# Patient Record
Sex: FEMALE | Race: WHITE | Employment: OTHER | URBAN - NONMETROPOLITAN AREA
[De-identification: names, ages, dates, MRNs, and addresses within clinical notes are randomized per-mention and may not be internally consistent; named-entity substitution may affect disease eponyms.]

---

## 2017-02-17 ENCOUNTER — OFFICE VISIT (OUTPATIENT)
Dept: PRIMARY CARE CLINIC | Facility: CLINIC | Age: 67
End: 2017-02-17

## 2017-02-17 VITALS
DIASTOLIC BLOOD PRESSURE: 81 MMHG | OXYGEN SATURATION: 93 % | HEART RATE: 79 BPM | TEMPERATURE: 99.5 F | BODY MASS INDEX: 36.37 KG/M2 | WEIGHT: 213 LBS | SYSTOLIC BLOOD PRESSURE: 122 MMHG | HEIGHT: 64 IN | RESPIRATION RATE: 16 BRPM

## 2017-02-17 DIAGNOSIS — J01.00 ACUTE MAXILLARY SINUSITIS, RECURRENCE NOT SPECIFIED: Primary | ICD-10-CM

## 2017-02-17 PROCEDURE — 4040F PNEUMOC VAC/ADMIN/RCVD: CPT | Performed by: NURSE PRACTITIONER

## 2017-02-17 PROCEDURE — G8400 PT W/DXA NO RESULTS DOC: HCPCS | Performed by: NURSE PRACTITIONER

## 2017-02-17 PROCEDURE — 3017F COLORECTAL CA SCREEN DOC REV: CPT | Performed by: NURSE PRACTITIONER

## 2017-02-17 PROCEDURE — 1090F PRES/ABSN URINE INCON ASSESS: CPT | Performed by: NURSE PRACTITIONER

## 2017-02-17 PROCEDURE — 3014F SCREEN MAMMO DOC REV: CPT | Performed by: NURSE PRACTITIONER

## 2017-02-17 PROCEDURE — G8484 FLU IMMUNIZE NO ADMIN: HCPCS | Performed by: NURSE PRACTITIONER

## 2017-02-17 PROCEDURE — G8427 DOCREV CUR MEDS BY ELIG CLIN: HCPCS | Performed by: NURSE PRACTITIONER

## 2017-02-17 PROCEDURE — G8417 CALC BMI ABV UP PARAM F/U: HCPCS | Performed by: NURSE PRACTITIONER

## 2017-02-17 PROCEDURE — 99202 OFFICE O/P NEW SF 15 MIN: CPT | Performed by: NURSE PRACTITIONER

## 2017-02-17 PROCEDURE — 1123F ACP DISCUSS/DSCN MKR DOCD: CPT | Performed by: NURSE PRACTITIONER

## 2017-02-17 PROCEDURE — 1036F TOBACCO NON-USER: CPT | Performed by: NURSE PRACTITIONER

## 2017-02-17 RX ORDER — SILICONE ADHESIVE 1.5" X 3"
2 SHEET (EA) TOPICAL PRN
COMMUNITY

## 2017-02-17 RX ORDER — LIDOCAINE 50 MG/G
1 OINTMENT TOPICAL DAILY
COMMUNITY
Start: 2016-04-21

## 2017-02-17 RX ORDER — AMOXICILLIN AND CLAVULANATE POTASSIUM 875; 125 MG/1; MG/1
1 TABLET, FILM COATED ORAL 2 TIMES DAILY
Qty: 20 TABLET | Refills: 0 | Status: SHIPPED | OUTPATIENT
Start: 2017-02-17 | End: 2017-02-27

## 2017-02-17 RX ORDER — GUAIFENESIN AND CODEINE PHOSPHATE 100; 10 MG/5ML; MG/5ML
5 SOLUTION ORAL EVERY 4 HOURS PRN
Qty: 120 ML | Refills: 0 | Status: SHIPPED | OUTPATIENT
Start: 2017-02-17 | End: 2017-02-24

## 2017-02-17 RX ORDER — PEN NEEDLE, DIABETIC 31 GX5/16"
1 NEEDLE, DISPOSABLE MISCELLANEOUS 3 TIMES DAILY
COMMUNITY
Start: 2017-02-01

## 2017-02-17 ASSESSMENT — ENCOUNTER SYMPTOMS
RHINORRHEA: 1
COUGH: 1
HEMOPTYSIS: 0
NAUSEA: 1
WHEEZING: 1
CHANGE IN BOWEL HABIT: 0
VOMITING: 0
HEARTBURN: 0
DIARRHEA: 0
SORE THROAT: 0
VISUAL CHANGE: 0
SHORTNESS OF BREATH: 0
ABDOMINAL PAIN: 0
SWOLLEN GLANDS: 0

## 2018-01-15 ENCOUNTER — OFFICE VISIT (OUTPATIENT)
Dept: PRIMARY CARE CLINIC | Age: 68
End: 2018-01-15
Payer: MEDICARE

## 2018-01-15 VITALS
TEMPERATURE: 98.7 F | RESPIRATION RATE: 20 BRPM | WEIGHT: 215 LBS | HEIGHT: 65 IN | HEART RATE: 82 BPM | DIASTOLIC BLOOD PRESSURE: 96 MMHG | BODY MASS INDEX: 35.82 KG/M2 | SYSTOLIC BLOOD PRESSURE: 127 MMHG | OXYGEN SATURATION: 95 %

## 2018-01-15 DIAGNOSIS — R53.83 FATIGUE, UNSPECIFIED TYPE: ICD-10-CM

## 2018-01-15 DIAGNOSIS — J01.00 ACUTE MAXILLARY SINUSITIS, RECURRENCE NOT SPECIFIED: Primary | ICD-10-CM

## 2018-01-15 LAB
INFLUENZA A ANTIBODY: NEGATIVE
INFLUENZA B ANTIBODY: NEGATIVE

## 2018-01-15 PROCEDURE — 87804 INFLUENZA ASSAY W/OPTIC: CPT | Performed by: NURSE PRACTITIONER

## 2018-01-15 PROCEDURE — 99213 OFFICE O/P EST LOW 20 MIN: CPT | Performed by: NURSE PRACTITIONER

## 2018-01-15 RX ORDER — GABAPENTIN 600 MG/1
1200 TABLET ORAL 2 TIMES DAILY
COMMUNITY

## 2018-01-15 RX ORDER — FAMOTIDINE 20 MG/1
40 TABLET, FILM COATED ORAL 2 TIMES DAILY
COMMUNITY

## 2018-01-15 RX ORDER — TRIAMTERENE AND HYDROCHLOROTHIAZIDE 37.5; 25 MG/1; MG/1
1 TABLET ORAL DAILY
COMMUNITY

## 2018-01-15 RX ORDER — HYDROCODONE BITARTRATE AND ACETAMINOPHEN 7.5; 325 MG/1; MG/1
1 TABLET ORAL EVERY 6 HOURS PRN
COMMUNITY

## 2018-01-15 RX ORDER — INSULIN GLARGINE 100 [IU]/ML
78 INJECTION, SOLUTION SUBCUTANEOUS NIGHTLY
COMMUNITY

## 2018-01-15 RX ORDER — AMOXICILLIN AND CLAVULANATE POTASSIUM 500; 125 MG/1; MG/1
1 TABLET, FILM COATED ORAL 2 TIMES DAILY
Qty: 20 TABLET | Refills: 0 | Status: SHIPPED | OUTPATIENT
Start: 2018-01-15 | End: 2018-01-25

## 2018-01-15 RX ORDER — BUPROPION HYDROCHLORIDE 100 MG/1
200 TABLET ORAL 2 TIMES DAILY
COMMUNITY

## 2018-01-15 RX ORDER — LISINOPRIL 10 MG/1
10 TABLET ORAL DAILY
COMMUNITY

## 2018-01-15 RX ORDER — METOPROLOL TARTRATE 50 MG/1
125 TABLET, FILM COATED ORAL NIGHTLY
COMMUNITY

## 2018-01-15 RX ORDER — FENOFIBRATE 145 MG/1
145 TABLET, COATED ORAL DAILY
COMMUNITY

## 2018-01-15 RX ORDER — SIMVASTATIN 20 MG
20 TABLET ORAL NIGHTLY
COMMUNITY

## 2018-01-15 RX ORDER — CYCLOBENZAPRINE HCL 10 MG
10 TABLET ORAL 3 TIMES DAILY PRN
COMMUNITY

## 2018-01-15 RX ORDER — CHOLECALCIFEROL (VITAMIN D3) 125 MCG
5000 CAPSULE ORAL DAILY
COMMUNITY
End: 2018-01-17 | Stop reason: ALTCHOICE

## 2018-01-15 ASSESSMENT — ENCOUNTER SYMPTOMS
SINUS PAIN: 1
CHEST TIGHTNESS: 0
STRIDOR: 0
TROUBLE SWALLOWING: 0
FACIAL SWELLING: 0
SINUS PRESSURE: 1
COUGH: 1
WHEEZING: 0

## 2018-01-15 NOTE — PATIENT INSTRUCTIONS

## 2018-01-15 NOTE — PROGRESS NOTES
[facility] WALK-IN CARE  [place] [address] [id number]  Dept: [phone]  Dept Fax: [phone]    Robert Penn is a 78 y.o. female who presents to the [address] [place] [address] in Care today for her medical conditions/complaints as noted below. Robert Penn is c/o of Cough (four weeks) and Congestion      HPI:   HPI  Robert Penn is a 78 y.o. female who presents with x 4 weeks of cough and congestion. Sinus pain and facial pain on right side only. Sinus drainage is  thicker and green. Tactile fevers. She reports that she has been coughing but believes the cough is due to the sinus drainage. Mulugeta Martin reports a significant hisitory of cystic lungs and uses oxygen 2012 at 3L/NC. Sees a pulmonologist in [place] Dr. Yosvany Davis. Denies hsitory of MI and or failure. Reports herself as \"pretty well off, not sick a lot\". Denies kidney dysfunction. No past medical history on file. Current Outpatient Prescriptions   Medication Sig Dispense Refill    famotidine (PEPCID) 20 MG tablet Take 20 mg by mouth 2 times daily      aspirin 81 MG tablet Take 81 mg by mouth daily      buPROPion (WELLBUTRIN) 100 MG tablet Take 100 mg by mouth 2 times daily      cyclobenzaprine (FLEXERIL) 10 MG tablet Take 10 mg by mouth 3 times daily as needed for Muscle spasms      gabapentin (NEURONTIN) 600 MG tablet Take 600 mg by mouth 3 times daily.       metoprolol tartrate (LOPRESSOR) 50 MG tablet Take 50 mg by mouth 2 times daily      triamterene-hydrochlorothiazide (MAXZIDE-25) 37.5-25 MG per tablet Take 1 tablet by mouth daily      simvastatin (ZOCOR) 20 MG tablet Take 20 mg by mouth nightly      metFORMIN (GLUCOPHAGE) 500 MG tablet Take 500 mg by mouth 2 times daily (with meals)      lisinopril (PRINIVIL;ZESTRIL) 10 MG tablet Take 10 mg by mouth daily      fenofibrate (TRICOR) 145 MG tablet Take 145 mg by mouth daily      insulin glargine (LANTUS) 100 UNIT/ML injection

## 2018-02-12 ENCOUNTER — ANESTHESIA EVENT (OUTPATIENT)
Dept: OPERATING ROOM | Age: 68
End: 2018-02-12
Payer: MEDICARE

## 2018-02-13 ENCOUNTER — HOSPITAL ENCOUNTER (OUTPATIENT)
Age: 68
Setting detail: OUTPATIENT SURGERY
Discharge: HOME | End: 2018-02-13
Attending: ORTHOPAEDIC SURGERY | Admitting: ORTHOPAEDIC SURGERY
Payer: MEDICARE

## 2018-02-13 ENCOUNTER — ANESTHESIA (OUTPATIENT)
Dept: OPERATING ROOM | Age: 68
End: 2018-02-13
Payer: MEDICARE

## 2018-02-13 VITALS
TEMPERATURE: 97.4 F | DIASTOLIC BLOOD PRESSURE: 65 MMHG | SYSTOLIC BLOOD PRESSURE: 135 MMHG | HEART RATE: 73 BPM | OXYGEN SATURATION: 91 % | BODY MASS INDEX: 35.82 KG/M2 | WEIGHT: 215 LBS | HEIGHT: 65 IN | RESPIRATION RATE: 18 BRPM

## 2018-02-13 VITALS — OXYGEN SATURATION: 93 % | DIASTOLIC BLOOD PRESSURE: 68 MMHG | SYSTOLIC BLOOD PRESSURE: 124 MMHG

## 2018-02-13 DIAGNOSIS — G89.18 POST-OP PAIN: Primary | ICD-10-CM

## 2018-02-13 PROBLEM — G56.02 LEFT CARPAL TUNNEL SYNDROME: Status: ACTIVE | Noted: 2018-02-13

## 2018-02-13 LAB — GLUCOSE BLD-MCNC: 97 MG/DL (ref 74–100)

## 2018-02-13 PROCEDURE — 82947 ASSAY GLUCOSE BLOOD QUANT: CPT

## 2018-02-13 PROCEDURE — 7100000011 HC PHASE II RECOVERY - ADDTL 15 MIN: Performed by: ORTHOPAEDIC SURGERY

## 2018-02-13 PROCEDURE — 3600000013 HC SURGERY LEVEL 3 ADDTL 15MIN: Performed by: ORTHOPAEDIC SURGERY

## 2018-02-13 PROCEDURE — 7100000010 HC PHASE II RECOVERY - FIRST 15 MIN: Performed by: ORTHOPAEDIC SURGERY

## 2018-02-13 PROCEDURE — 6370000000 HC RX 637 (ALT 250 FOR IP): Performed by: ORTHOPAEDIC SURGERY

## 2018-02-13 PROCEDURE — 6360000002 HC RX W HCPCS: Performed by: NURSE ANESTHETIST, CERTIFIED REGISTERED

## 2018-02-13 PROCEDURE — 3700000001 HC ADD 15 MINUTES (ANESTHESIA): Performed by: ORTHOPAEDIC SURGERY

## 2018-02-13 PROCEDURE — 3600000003 HC SURGERY LEVEL 3 BASE: Performed by: ORTHOPAEDIC SURGERY

## 2018-02-13 PROCEDURE — 6360000002 HC RX W HCPCS: Performed by: ORTHOPAEDIC SURGERY

## 2018-02-13 PROCEDURE — 2500000003 HC RX 250 WO HCPCS: Performed by: NURSE ANESTHETIST, CERTIFIED REGISTERED

## 2018-02-13 PROCEDURE — 3700000000 HC ANESTHESIA ATTENDED CARE: Performed by: ORTHOPAEDIC SURGERY

## 2018-02-13 PROCEDURE — 2580000003 HC RX 258: Performed by: ORTHOPAEDIC SURGERY

## 2018-02-13 PROCEDURE — 2500000003 HC RX 250 WO HCPCS

## 2018-02-13 RX ORDER — FENTANYL CITRATE 50 UG/ML
50 INJECTION, SOLUTION INTRAMUSCULAR; INTRAVENOUS EVERY 5 MIN PRN
Status: DISCONTINUED | OUTPATIENT
Start: 2018-02-13 | End: 2018-02-13 | Stop reason: HOSPADM

## 2018-02-13 RX ORDER — OXYCODONE HYDROCHLORIDE AND ACETAMINOPHEN 5; 325 MG/1; MG/1
2 TABLET ORAL EVERY 4 HOURS PRN
Status: DISCONTINUED | OUTPATIENT
Start: 2018-02-13 | End: 2018-02-13 | Stop reason: HOSPADM

## 2018-02-13 RX ORDER — METOCLOPRAMIDE HYDROCHLORIDE 5 MG/ML
INJECTION INTRAMUSCULAR; INTRAVENOUS PRN
Status: DISCONTINUED | OUTPATIENT
Start: 2018-02-13 | End: 2018-02-13 | Stop reason: SDUPTHER

## 2018-02-13 RX ORDER — PROMETHAZINE HYDROCHLORIDE 25 MG/ML
6.25 INJECTION, SOLUTION INTRAMUSCULAR; INTRAVENOUS
Status: DISCONTINUED | OUTPATIENT
Start: 2018-02-13 | End: 2018-02-13 | Stop reason: HOSPADM

## 2018-02-13 RX ORDER — SODIUM CHLORIDE 0.9 % (FLUSH) 0.9 %
10 SYRINGE (ML) INJECTION PRN
Status: DISCONTINUED | OUTPATIENT
Start: 2018-02-13 | End: 2018-02-13 | Stop reason: HOSPADM

## 2018-02-13 RX ORDER — SODIUM CHLORIDE, SODIUM LACTATE, POTASSIUM CHLORIDE, CALCIUM CHLORIDE 600; 310; 30; 20 MG/100ML; MG/100ML; MG/100ML; MG/100ML
INJECTION, SOLUTION INTRAVENOUS CONTINUOUS
Status: DISCONTINUED | OUTPATIENT
Start: 2018-02-13 | End: 2018-02-13 | Stop reason: HOSPADM

## 2018-02-13 RX ORDER — PROPOFOL 10 MG/ML
INJECTION, EMULSION INTRAVENOUS CONTINUOUS PRN
Status: DISCONTINUED | OUTPATIENT
Start: 2018-02-13 | End: 2018-02-13 | Stop reason: SDUPTHER

## 2018-02-13 RX ORDER — FENTANYL CITRATE 50 UG/ML
25 INJECTION, SOLUTION INTRAMUSCULAR; INTRAVENOUS EVERY 5 MIN PRN
Status: DISCONTINUED | OUTPATIENT
Start: 2018-02-13 | End: 2018-02-13 | Stop reason: HOSPADM

## 2018-02-13 RX ORDER — FENTANYL CITRATE 50 UG/ML
INJECTION, SOLUTION INTRAMUSCULAR; INTRAVENOUS PRN
Status: DISCONTINUED | OUTPATIENT
Start: 2018-02-13 | End: 2018-02-13 | Stop reason: SDUPTHER

## 2018-02-13 RX ORDER — ACETAMINOPHEN 325 MG/1
650 TABLET ORAL ONCE
Status: COMPLETED | OUTPATIENT
Start: 2018-02-13 | End: 2018-02-13

## 2018-02-13 RX ORDER — ONDANSETRON 2 MG/ML
INJECTION INTRAMUSCULAR; INTRAVENOUS PRN
Status: DISCONTINUED | OUTPATIENT
Start: 2018-02-13 | End: 2018-02-13 | Stop reason: SDUPTHER

## 2018-02-13 RX ORDER — HYDROCODONE BITARTRATE AND ACETAMINOPHEN 5; 325 MG/1; MG/1
1 TABLET ORAL EVERY 4 HOURS PRN
Qty: 20 TABLET | Refills: 0 | Status: SHIPPED | OUTPATIENT
Start: 2018-02-13 | End: 2018-02-17

## 2018-02-13 RX ORDER — LIDOCAINE HYDROCHLORIDE 20 MG/ML
INJECTION, SOLUTION EPIDURAL; INFILTRATION; INTRACAUDAL; PERINEURAL PRN
Status: DISCONTINUED | OUTPATIENT
Start: 2018-02-13 | End: 2018-02-13 | Stop reason: SDUPTHER

## 2018-02-13 RX ORDER — BUPIVACAINE HYDROCHLORIDE 2.5 MG/ML
INJECTION, SOLUTION INFILTRATION; PERINEURAL PRN
Status: DISCONTINUED | OUTPATIENT
Start: 2018-02-13 | End: 2018-02-13 | Stop reason: HOSPADM

## 2018-02-13 RX ORDER — LIDOCAINE HYDROCHLORIDE 20 MG/ML
INJECTION, SOLUTION INFILTRATION; PERINEURAL PRN
Status: DISCONTINUED | OUTPATIENT
Start: 2018-02-13 | End: 2018-02-13 | Stop reason: SDUPTHER

## 2018-02-13 RX ORDER — OXYCODONE HYDROCHLORIDE AND ACETAMINOPHEN 5; 325 MG/1; MG/1
1 TABLET ORAL EVERY 4 HOURS PRN
Status: DISCONTINUED | OUTPATIENT
Start: 2018-02-13 | End: 2018-02-13 | Stop reason: HOSPADM

## 2018-02-13 RX ORDER — SODIUM CHLORIDE 0.9 % (FLUSH) 0.9 %
10 SYRINGE (ML) INJECTION EVERY 12 HOURS SCHEDULED
Status: DISCONTINUED | OUTPATIENT
Start: 2018-02-13 | End: 2018-02-13 | Stop reason: HOSPADM

## 2018-02-13 RX ADMIN — SODIUM CHLORIDE, POTASSIUM CHLORIDE, SODIUM LACTATE AND CALCIUM CHLORIDE: 600; 310; 30; 20 INJECTION, SOLUTION INTRAVENOUS at 10:39

## 2018-02-13 RX ADMIN — FENTANYL CITRATE 20 MCG: 50 INJECTION INTRAMUSCULAR; INTRAVENOUS at 14:03

## 2018-02-13 RX ADMIN — PROPOFOL 80 MCG/KG/MIN: 10 INJECTION, EMULSION INTRAVENOUS at 13:42

## 2018-02-13 RX ADMIN — FENTANYL CITRATE 20 MCG: 50 INJECTION INTRAMUSCULAR; INTRAVENOUS at 13:47

## 2018-02-13 RX ADMIN — CEFAZOLIN SODIUM 2 G: 2 SOLUTION INTRAVENOUS at 13:17

## 2018-02-13 RX ADMIN — METOCLOPRAMIDE 10 MG: 5 INJECTION, SOLUTION INTRAMUSCULAR; INTRAVENOUS at 14:01

## 2018-02-13 RX ADMIN — FENTANYL CITRATE 20 MCG: 50 INJECTION INTRAMUSCULAR; INTRAVENOUS at 14:09

## 2018-02-13 RX ADMIN — FENTANYL CITRATE 20 MCG: 50 INJECTION INTRAMUSCULAR; INTRAVENOUS at 13:39

## 2018-02-13 RX ADMIN — FENTANYL CITRATE 20 MCG: 50 INJECTION INTRAMUSCULAR; INTRAVENOUS at 13:57

## 2018-02-13 RX ADMIN — LIDOCAINE HYDROCHLORIDE 15 ML: 20 INJECTION, SOLUTION EPIDURAL; INFILTRATION; INTRACAUDAL; PERINEURAL at 13:48

## 2018-02-13 RX ADMIN — LIDOCAINE HYDROCHLORIDE 50 MG: 20 INJECTION, SOLUTION INFILTRATION; PERINEURAL at 13:41

## 2018-02-13 RX ADMIN — ACETAMINOPHEN 650 MG: 325 TABLET, FILM COATED ORAL at 14:52

## 2018-02-13 RX ADMIN — ONDANSETRON 4 MG: 2 INJECTION INTRAMUSCULAR; INTRAVENOUS at 13:59

## 2018-02-13 ASSESSMENT — PULMONARY FUNCTION TESTS
PIF_VALUE: 1
PIF_VALUE: 0
PIF_VALUE: 1
PIF_VALUE: 1
PIF_VALUE: 0
PIF_VALUE: 1
PIF_VALUE: 0
PIF_VALUE: 0
PIF_VALUE: 1
PIF_VALUE: 0
PIF_VALUE: 1
PIF_VALUE: 0
PIF_VALUE: 2
PIF_VALUE: 1
PIF_VALUE: 0
PIF_VALUE: 1
PIF_VALUE: 0
PIF_VALUE: 1
PIF_VALUE: 1
PIF_VALUE: 0
PIF_VALUE: 1
PIF_VALUE: 2

## 2018-02-13 ASSESSMENT — PAIN SCALES - GENERAL
PAINLEVEL_OUTOF10: 0
PAINLEVEL_OUTOF10: 3

## 2018-02-13 ASSESSMENT — PAIN - FUNCTIONAL ASSESSMENT: PAIN_FUNCTIONAL_ASSESSMENT: 0-10

## 2018-02-13 NOTE — ANESTHESIA POSTPROCEDURE EVALUATION
Department of Anesthesiology  Postprocedure Note    Patient: Misa Dutta  MRN: [id number]  YOB: 1950  Date of evaluation: 2/13/2018  Time:  2:30 PM     Procedure Summary     Date:  02/13/18 Room / Location:  [address] [place] [address] / [facility] AT THE [facility]    Anesthesia Start:  3049 Anesthesia Stop:  1017    Procedures:       CARPAL TUNNEL RELEASE (Right )      FINGER TRIGGER RELEASE-RIGHT MIDDLE (Right ) Diagnosis:  (TRIGGER FINGER,RIGHT MIDDLE FINGER, CARPAL TUNNEL SYNDROME RIGHT WRIST)    Surgeon:  Patrice Ni MD Responsible Provider:  Zohreh Veliz CRNA    Anesthesia Type:  general, TIVA, Kody block ASA Status:  4          Anesthesia Type: general, TIVA, Kody block    Yadira Phase I:      Yadira Phase II:      Last vitals: Reviewed and per EMR flowsheets.        Anesthesia Post Evaluation    Patient location during evaluation: PACU  Patient participation: complete - patient participated  Level of consciousness: awake and alert  Pain score: 0  Airway patency: patent  Nausea & Vomiting: no nausea and no vomiting  Complications: no  Cardiovascular status: blood pressure returned to baseline  Respiratory status: acceptable  Hydration status: euvolemic

## 2018-02-13 NOTE — ANESTHESIA PRE PROCEDURE
Department of Anesthesiology  Preprocedure Note       Name:  Darlene Joshua   Age:  78 y.o.  :  1950                                          MRN:  [id number]         Date:  2018      Surgeon: Teetee Snyder):  Meño Ledesma MD    Procedure: Procedure(s):  CARPAL TUNNEL RELEASE  FINGER TRIGGER RELEASE-RIGHT MIDDLE    Medications prior to admission:   Prior to Admission medications    Medication Sig Start Date End Date Taking?  Authorizing Provider   lidocaine (XYLOCAINE) 5 % ointment Apply 1 inch topically daily 16  Yes Historical Provider, MD   sodium chloride (PEACE 128) 5 % ophthalmic solution Apply 2 drops to eye as needed   Yes Historical Provider, MD   buPROPion (WELLBUTRIN) 100 MG tablet Take 200 mg by mouth 2 times daily    Yes Historical Provider, MD   albuterol (PROVENTIL) (2.5 MG/3ML) 0.083% nebulizer solution Take 2.5 mg by nebulization every 6 hours as needed for Wheezing   Yes Historical Provider, MD   cyclobenzaprine (FLEXERIL) 10 MG tablet Take 20 mg by mouth 3 times daily as needed for Muscle spasms   Yes Historical Provider, MD   famotidine (PEPCID) 20 MG tablet Take 20 mg by mouth 2 times daily   Yes Historical Provider, MD   fenofibrate (TRICOR) 145 MG tablet Take 145 mg by mouth daily   Yes Historical Provider, MD   gabapentin (NEURONTIN) 600 MG tablet Take 1,200 mg by mouth 2 times daily    Yes Historical Provider, MD   triamterene-hydrochlorothiazide (MAXZIDE-25) 37.5-25 MG per tablet Take 2 tablets by mouth daily   Yes Historical Provider, MD   insulin glargine (LANTUS) 100 UNIT/ML injection vial Inject 78 Units into the skin nightly    Yes Historical Provider, MD   levothyroxine (SYNTHROID) 75 MCG tablet Take 75 mcg by mouth Daily   Yes Historical Provider, MD   metFORMIN (GLUCOPHAGE) 1000 MG tablet Take 1,000 mg by mouth 2 times daily (with meals)   Yes Historical Provider, MD   metoprolol (LOPRESSOR) 50 MG tablet Take 125 mg by mouth nightly    Yes Historical Provider, MD

## 2018-02-13 NOTE — OP NOTE
Date of surgery: 2/13/18    Preoperative diagnosis:  1. Left carpal tunnel syndrome  2. Left trigger Middle Finger    Postoperative diagnosis:  Same    Procedure:  1. Left carpal tunnel release  2. Left Middle Finger A1 pulley release    Surgeon: Enedelia Fan M.D. Anesthesia: Kody block with MAC    EBL: Minimal    Tourniquet Time: 22 minutes at 024 mm Hg    Complications: None    Disposition: Stable to the recovery room. Indications for procedure: Patient presented with history and physical exam findings consistent with carpal tunnel syndrome. Diagnosis was confirmed with electrodiagnostic testing. Patient attempted conservative treatment including brace wear. After failing to improve, patient elected to proceed with surgical release. She also had painful locking in the long digit, consistent with trigger finger. Patient elected to undergo surgical release. Informed consent was obtained following discussion of risks and benefits. Description of procedure: Patient was identified in preop holding area. With the patient's agreement, the operative extremity was marked as site of surgery. The patient was taken to the operating room and placed supine on the operative table. Prophylactic IV antibiotics were administered. A well-padded tourniquet was applied to the operative extremity. [place] block was administered. IV sedation was administered by anesthesia. The left upper extremity was then prepped and draped in sterile fashion. Preoperative timeout taken to ensure the proper patient, surgical site and procedure. We then created a longitudinal incision centered over the transverse carpal ligament. Sharp dissection was carried down through the skin and subcutaneous fat. Superficial palmar fascia was identified and was split longitudinally exposing the transverse carpal ligament. The ligament was then incised longitudinally.  Sharp tenotomy scissors were then used to bluntly spread superficial and deep to the

## 2018-06-02 ENCOUNTER — HOSPITAL ENCOUNTER (EMERGENCY)
Age: 68
Discharge: HOME | End: 2018-06-02
Attending: EMERGENCY MEDICINE
Payer: MEDICARE

## 2018-06-02 ENCOUNTER — APPOINTMENT (OUTPATIENT)
Dept: CT IMAGING | Age: 68
End: 2018-06-02
Payer: MEDICARE

## 2018-06-02 VITALS
SYSTOLIC BLOOD PRESSURE: 140 MMHG | HEART RATE: 67 BPM | OXYGEN SATURATION: 98 % | DIASTOLIC BLOOD PRESSURE: 73 MMHG | RESPIRATION RATE: 18 BRPM | TEMPERATURE: 98.5 F

## 2018-06-02 DIAGNOSIS — M54.50 ACUTE BILATERAL LOW BACK PAIN WITHOUT SCIATICA: Primary | ICD-10-CM

## 2018-06-02 LAB
-: ABNORMAL
AMORPHOUS: ABNORMAL
BACTERIA: ABNORMAL
BILIRUBIN URINE: NEGATIVE
CASTS UA: ABNORMAL /LPF
COLOR: YELLOW
COMMENT UA: ABNORMAL
CRYSTALS, UA: ABNORMAL /HPF
EPITHELIAL CELLS UA: ABNORMAL /HPF (ref 0–25)
GLUCOSE URINE: NEGATIVE
KETONES, URINE: NEGATIVE
LEUKOCYTE ESTERASE, URINE: NEGATIVE
MUCUS: ABNORMAL
NITRITE, URINE: NEGATIVE
OTHER OBSERVATIONS UA: ABNORMAL
PH UA: 7 (ref 5–9)
PROTEIN UA: ABNORMAL
RBC UA: ABNORMAL /HPF (ref 0–2)
RENAL EPITHELIAL, UA: ABNORMAL /HPF
SPECIFIC GRAVITY UA: 1.01 (ref 1.01–1.02)
TRICHOMONAS: ABNORMAL
TURBIDITY: CLEAR
URINE HGB: NEGATIVE
UROBILINOGEN, URINE: NORMAL
WBC UA: ABNORMAL /HPF (ref 0–5)
YEAST: ABNORMAL

## 2018-06-02 PROCEDURE — 99284 EMERGENCY DEPT VISIT MOD MDM: CPT

## 2018-06-02 PROCEDURE — 81001 URINALYSIS AUTO W/SCOPE: CPT

## 2018-06-02 PROCEDURE — 74176 CT ABD & PELVIS W/O CONTRAST: CPT

## 2018-06-02 ASSESSMENT — PAIN SCALES - GENERAL
PAINLEVEL_OUTOF10: 6
PAINLEVEL_OUTOF10: 0

## 2018-06-02 ASSESSMENT — PAIN DESCRIPTION - LOCATION: LOCATION: FLANK

## 2018-06-02 ASSESSMENT — PAIN DESCRIPTION - PAIN TYPE: TYPE: ACUTE PAIN

## 2018-06-02 ASSESSMENT — ENCOUNTER SYMPTOMS: BACK PAIN: 1

## 2018-06-02 ASSESSMENT — PAIN DESCRIPTION - ORIENTATION: ORIENTATION: RIGHT;LEFT

## 2020-05-16 ENCOUNTER — APPOINTMENT (OUTPATIENT)
Dept: CT IMAGING | Age: 70
End: 2020-05-16
Payer: MEDICARE

## 2020-05-16 ENCOUNTER — HOSPITAL ENCOUNTER (EMERGENCY)
Age: 70
Discharge: HOME | End: 2020-05-16
Attending: EMERGENCY MEDICINE
Payer: MEDICARE

## 2020-05-16 VITALS
HEART RATE: 80 BPM | TEMPERATURE: 97.3 F | RESPIRATION RATE: 17 BRPM | OXYGEN SATURATION: 95 % | DIASTOLIC BLOOD PRESSURE: 102 MMHG | SYSTOLIC BLOOD PRESSURE: 155 MMHG

## 2020-05-16 LAB
-: ABNORMAL
ABSOLUTE EOS #: 0.31 K/UL (ref 0–0.44)
ABSOLUTE IMMATURE GRANULOCYTE: <0.03 K/UL (ref 0–0.3)
ABSOLUTE LYMPH #: 4.28 K/UL (ref 1.1–3.7)
ABSOLUTE MONO #: 0.47 K/UL (ref 0.1–1.2)
ALBUMIN SERPL-MCNC: 4.3 G/DL (ref 3.5–5.2)
ALBUMIN/GLOBULIN RATIO: 1.7 (ref 1–2.5)
ALP BLD-CCNC: 57 U/L (ref 35–104)
ALT SERPL-CCNC: 13 U/L (ref 5–33)
AMORPHOUS: ABNORMAL
ANION GAP SERPL CALCULATED.3IONS-SCNC: 11 MMOL/L (ref 9–17)
AST SERPL-CCNC: 17 U/L
BACTERIA: ABNORMAL
BASOPHILS # BLD: 1 % (ref 0–2)
BASOPHILS ABSOLUTE: 0.06 K/UL (ref 0–0.2)
BILIRUB SERPL-MCNC: 0.26 MG/DL (ref 0.3–1.2)
BILIRUBIN URINE: NEGATIVE
BUN BLDV-MCNC: 21 MG/DL (ref 8–23)
BUN/CREAT BLD: 21 (ref 9–20)
CALCIUM SERPL-MCNC: 9.8 MG/DL (ref 8.6–10.4)
CASTS UA: ABNORMAL /LPF
CHLORIDE BLD-SCNC: 102 MMOL/L (ref 98–107)
CO2: 30 MMOL/L (ref 20–31)
COLOR: YELLOW
COMMENT UA: ABNORMAL
CREAT SERPL-MCNC: 0.98 MG/DL (ref 0.5–0.9)
CRYSTALS, UA: ABNORMAL /HPF
DIFFERENTIAL TYPE: ABNORMAL
EOSINOPHILS RELATIVE PERCENT: 4 % (ref 1–4)
EPITHELIAL CELLS UA: ABNORMAL /HPF (ref 0–25)
GFR AFRICAN AMERICAN: >60 ML/MIN
GFR NON-AFRICAN AMERICAN: 56 ML/MIN
GFR SERPL CREATININE-BSD FRML MDRD: ABNORMAL ML/MIN/{1.73_M2}
GFR SERPL CREATININE-BSD FRML MDRD: ABNORMAL ML/MIN/{1.73_M2}
GLUCOSE BLD-MCNC: 147 MG/DL (ref 70–99)
GLUCOSE URINE: NEGATIVE
HCT VFR BLD CALC: 44 % (ref 36.3–47.1)
HEMOGLOBIN: 13.7 G/DL (ref 11.9–15.1)
IMMATURE GRANULOCYTES: 0 %
KETONES, URINE: NEGATIVE
LEUKOCYTE ESTERASE, URINE: NEGATIVE
LIPASE: 161 U/L (ref 13–60)
LYMPHOCYTES # BLD: 57 % (ref 24–43)
MCH RBC QN AUTO: 29.1 PG (ref 25.2–33.5)
MCHC RBC AUTO-ENTMCNC: 31.1 G/DL (ref 28.4–34.8)
MCV RBC AUTO: 93.4 FL (ref 82.6–102.9)
MONOCYTES # BLD: 6 % (ref 3–12)
MUCUS: ABNORMAL
NITRITE, URINE: NEGATIVE
NRBC AUTOMATED: 0 PER 100 WBC
OTHER OBSERVATIONS UA: ABNORMAL
PDW BLD-RTO: 13.2 % (ref 11.8–14.4)
PH UA: 6 (ref 5–9)
PLATELET # BLD: 144 K/UL (ref 138–453)
PLATELET ESTIMATE: ABNORMAL
PMV BLD AUTO: 11.1 FL (ref 8.1–13.5)
POTASSIUM SERPL-SCNC: 3.7 MMOL/L (ref 3.7–5.3)
PROTEIN UA: ABNORMAL
RBC # BLD: 4.71 M/UL (ref 3.95–5.11)
RBC # BLD: ABNORMAL 10*6/UL
RBC UA: ABNORMAL /HPF (ref 0–2)
RENAL EPITHELIAL, UA: ABNORMAL /HPF
SEG NEUTROPHILS: 32 % (ref 36–65)
SEGMENTED NEUTROPHILS ABSOLUTE COUNT: 2.44 K/UL (ref 1.5–8.1)
SODIUM BLD-SCNC: 143 MMOL/L (ref 135–144)
SPECIFIC GRAVITY UA: >1.03 (ref 1.01–1.02)
TOTAL PROTEIN: 6.9 G/DL (ref 6.4–8.3)
TRICHOMONAS: ABNORMAL
TURBIDITY: CLEAR
URINE HGB: ABNORMAL
UROBILINOGEN, URINE: NORMAL
WBC # BLD: 7.6 K/UL (ref 3.5–11.3)
WBC # BLD: ABNORMAL 10*3/UL
WBC UA: ABNORMAL /HPF (ref 0–5)
YEAST: ABNORMAL

## 2020-05-16 PROCEDURE — 2580000003 HC RX 258: Performed by: EMERGENCY MEDICINE

## 2020-05-16 PROCEDURE — 85025 COMPLETE CBC W/AUTO DIFF WBC: CPT

## 2020-05-16 PROCEDURE — 83690 ASSAY OF LIPASE: CPT

## 2020-05-16 PROCEDURE — 74177 CT ABD & PELVIS W/CONTRAST: CPT

## 2020-05-16 PROCEDURE — 81001 URINALYSIS AUTO W/SCOPE: CPT

## 2020-05-16 PROCEDURE — 36415 COLL VENOUS BLD VENIPUNCTURE: CPT

## 2020-05-16 PROCEDURE — 6360000004 HC RX CONTRAST MEDICATION: Performed by: EMERGENCY MEDICINE

## 2020-05-16 PROCEDURE — 80053 COMPREHEN METABOLIC PANEL: CPT

## 2020-05-16 PROCEDURE — 99284 EMERGENCY DEPT VISIT MOD MDM: CPT

## 2020-05-16 RX ORDER — 0.9 % SODIUM CHLORIDE 0.9 %
500 INTRAVENOUS SOLUTION INTRAVENOUS ONCE
Status: COMPLETED | OUTPATIENT
Start: 2020-05-16 | End: 2020-05-16

## 2020-05-16 RX ADMIN — SODIUM CHLORIDE 500 ML: 9 INJECTION, SOLUTION INTRAVENOUS at 00:52

## 2020-05-16 RX ADMIN — IOPAMIDOL 75 ML: 755 INJECTION, SOLUTION INTRAVENOUS at 01:39

## 2020-05-16 ASSESSMENT — PAIN DESCRIPTION - LOCATION: LOCATION: ABDOMEN

## 2020-05-16 ASSESSMENT — PAIN DESCRIPTION - ORIENTATION: ORIENTATION: LEFT;UPPER

## 2020-05-16 ASSESSMENT — ENCOUNTER SYMPTOMS
ABDOMINAL PAIN: 1
EYE PAIN: 0
SHORTNESS OF BREATH: 0

## 2020-05-16 ASSESSMENT — PAIN SCALES - GENERAL: PAINLEVEL_OUTOF10: 3

## 2020-05-16 ASSESSMENT — PAIN DESCRIPTION - DESCRIPTORS: DESCRIPTORS: ACHING

## 2020-05-16 ASSESSMENT — PAIN DESCRIPTION - FREQUENCY: FREQUENCY: CONTINUOUS

## 2020-05-16 ASSESSMENT — PAIN DESCRIPTION - PAIN TYPE: TYPE: ACUTE PAIN

## 2020-05-16 NOTE — ED PROVIDER NOTES
[facility] ED  eMERGENCY dEPARTMENT eNCOUnter      Pt Name: Chuckie St  MRN: [id number]  [facility]  Date of evaluation: 2020  Provider: Kayley Herrera MD    CHIEF COMPLAINT     Chief Complaint   Patient presents with    Abdominal Pain     3 when sit, 10 when bends, started yesterday, LUQ       HISTORY OF PRESENT ILLNESS    Chuckie St is a 72 y.o. female who presents to the emergency department for evaluation of abdominal pain. Pain is located to the left side of the abdomen. Radiates down since the left lower quadrant. Symptoms started yesterday. Symptoms are worse with bending over. Better with sitting. She denies any associated nausea, vomiting, or diarrhea. Patient was recently prescribed Cipro and Flagyl for presumed diverticulitis but has not started taking these yet. She describes the pain as sharp. She rates the pain as moderate.       PAST MEDICAL HISTORY     Past Medical History:   Diagnosis Date    Chronic kidney disease     [facility]ystic fibrosis [facility]     Diabetes mellitus ([facility]     Fibromyalgia     Hyperlipidemia     Hypertension     Thyroid disease        SURGICAL HISTORY       Past Surgical History:   Procedure Laterality Date    BREAST LUMPECTOMY Left      SECTION      CHOLECYSTECTOMY      COLONOSCOPY  2013    HYSTERECTOMY      NE INCISE FINGER TENDON SHEATH Right 2018    FINGER TRIGGER RELEASE-RIGHT MIDDLE performed by Jessy You MD at [address]/CARPAL TUNNEL SURG Right 2018    CARPAL TUNNEL RELEASE performed by Jessy You MD at [address] [facility]       Previous Medications    ALBUTEROL (PROVENTIL) (2.5 MG/3ML) 0.083% NEBULIZER SOLUTION    Take 2.5 mg by nebulization every 6 hours as needed for Wheezing    ASPIRIN 81 MG TABLET    Take 81 mg by mouth daily    B-D UF III MINI PEN NEEDLES 31G X 5 MM MISC    Apply 1 Pen topically 3 times daily    BUPROPION (WELLBUTRIN) 100 MG TABLET    Take 200 mg by mouth 2 times daily     CYCLOBENZAPRINE (FLEXERIL) 10 MG TABLET    Take 20 mg by mouth 3 times daily as needed for Muscle spasms    FAMOTIDINE (PEPCID) 20 MG TABLET    Take 20 mg by mouth 2 times daily    FENOFIBRATE (TRICOR) 145 MG TABLET    Take 145 mg by mouth daily    GABAPENTIN (NEURONTIN) 600 MG TABLET    Take 1,200 mg by mouth 2 times daily     INSULIN GLARGINE (LANTUS) 100 UNIT/ML INJECTION VIAL    Inject 78 Units into the skin nightly     LEVOTHYROXINE (SYNTHROID) 75 MCG TABLET    Take 75 mcg by mouth Daily Takes 2 tablets on sunday    LIDOCAINE (XYLOCAINE) 5 % OINTMENT    Apply 1 inch topically daily    LISINOPRIL (PRINIVIL;ZESTRIL) 10 MG TABLET    Take 10 mg by mouth daily    METFORMIN (GLUCOPHAGE) 1000 MG TABLET    Take 1,000 mg by mouth 2 times daily (with meals)    METOPROLOL (LOPRESSOR) 50 MG TABLET    Take 125 mg by mouth nightly     SIMVASTATIN (ZOCOR) 20 MG TABLET    Take 20 mg by mouth nightly    SODIUM CHLORIDE (PEACE 128) 5 % OPHTHALMIC SOLUTION    Apply 2 drops to eye as needed    TRIAMTERENE-HYDROCHLOROTHIAZIDE (MAXZIDE-25) 37.5-25 MG PER TABLET    Take 2 tablets by mouth daily       ALLERGIES     Patient has no known allergies. FAMILY HISTORY     History reviewed. No pertinent family history.      SOCIAL HISTORY       Social History     Socioeconomic History    Marital status:      Spouse name: None    Number of children: None    Years of education: None    Highest education level: None   Occupational History    None   Social Needs    Financial resource strain: None    Food insecurity     Worry: None     Inability: None    Transportation needs     Medical: None     Non-medical: None   Tobacco Use    Smoking status: Former Smoker    Smokeless tobacco: Never Used   Substance and Sexual Activity    Alcohol use: No    Drug use: No    Sexual activity: None   Lifestyle    Physical activity     Days per week: None     Minutes per session: None    Stress: None   Relationships radiologist and shows perhaps early mild acute diverticulitis. Patient continues to be in no distress. She continues not waiting for pain at this time. Patient was discharged with instructions to continue Cipro and Flagyl as prescribed by her PCP. Return precautions were discussed. ED Medications administered this visit:    Medications   0.9 % sodium chloride bolus (0 mLs Intravenous Stopped 5/16/20 0225)   iopamidol (ISOVUE-370) 76 % injection 75 mL (75 mLs Intravenous Given 5/16/20 7188)       CLINICAL IMPRESSION      1.  Acute diverticulitis          DISPOSITION/PLAN   DISPOSITION Decision To Discharge 05/16/2020 02:58:57 AM      PATIENT REFERRED TO:  Rajanimaggy Padilla  [facility]  [address]  [address] [place] [address]  [phone]    Schedule an appointment as soon as possible for a visit in 3 days        DISCHARGE MEDICATIONS:  New Prescriptions    No medications on file            So Colunga MD (electronically signed)  AttendingEmergency Department Provider            So Colunga MD  05/16/20 2223

## 2021-05-24 ENCOUNTER — TELEPHONE (OUTPATIENT)
Dept: CARDIOLOGY CLINIC | Age: 71
End: 2021-05-24

## 2021-10-16 ENCOUNTER — APPOINTMENT (OUTPATIENT)
Dept: CT IMAGING | Age: 71
DRG: 392 | End: 2021-10-16
Payer: MEDICARE

## 2021-10-16 ENCOUNTER — HOSPITAL ENCOUNTER (INPATIENT)
Age: 71
LOS: 4 days | Discharge: HOME | DRG: 392 | End: 2021-10-20
Attending: FAMILY MEDICINE | Admitting: FAMILY MEDICINE
Payer: MEDICARE

## 2021-10-16 DIAGNOSIS — K57.20 COLONIC DIVERTICULAR ABSCESS: Primary | ICD-10-CM

## 2021-10-16 PROBLEM — Z79.4 TYPE 2 DIABETES MELLITUS WITH COMPLICATION, WITH LONG-TERM CURRENT USE OF INSULIN (HCC): Status: ACTIVE | Noted: 2021-10-16

## 2021-10-16 PROBLEM — E11.8 TYPE 2 DIABETES MELLITUS WITH COMPLICATION, WITH LONG-TERM CURRENT USE OF INSULIN (HCC): Status: ACTIVE | Noted: 2021-10-16

## 2021-10-16 PROBLEM — K57.92 DIVERTICULITIS: Status: ACTIVE | Noted: 2021-10-16

## 2021-10-16 PROBLEM — I10 ESSENTIAL HYPERTENSION: Status: ACTIVE | Noted: 2021-10-16

## 2021-10-16 LAB
-: ABNORMAL
ABSOLUTE EOS #: 0.29 K/UL (ref 0–0.44)
ABSOLUTE IMMATURE GRANULOCYTE: 0.11 K/UL (ref 0–0.3)
ABSOLUTE LYMPH #: 3.26 K/UL (ref 1.1–3.7)
ABSOLUTE MONO #: 0.83 K/UL (ref 0.1–1.2)
ALBUMIN SERPL-MCNC: 3.8 G/DL (ref 3.5–5.2)
ALBUMIN/GLOBULIN RATIO: 1.2 (ref 1–2.5)
ALP BLD-CCNC: 87 U/L (ref 35–104)
ALT SERPL-CCNC: 26 U/L (ref 5–33)
AMORPHOUS: ABNORMAL
AMYLASE: 35 U/L (ref 28–100)
ANION GAP SERPL CALCULATED.3IONS-SCNC: 10 MMOL/L (ref 9–17)
AST SERPL-CCNC: 20 U/L
BACTERIA: ABNORMAL
BASOPHILS # BLD: 1 % (ref 0–2)
BASOPHILS ABSOLUTE: 0.06 K/UL (ref 0–0.2)
BILIRUB SERPL-MCNC: 0.34 MG/DL (ref 0.3–1.2)
BILIRUBIN URINE: ABNORMAL
BUN BLDV-MCNC: 21 MG/DL (ref 8–23)
BUN/CREAT BLD: 17 (ref 9–20)
CALCIUM SERPL-MCNC: 9.5 MG/DL (ref 8.6–10.4)
CASTS UA: ABNORMAL /LPF
CHLORIDE BLD-SCNC: 96 MMOL/L (ref 98–107)
CO2: 24 MMOL/L (ref 20–31)
COLOR: YELLOW
COMMENT UA: ABNORMAL
CREAT SERPL-MCNC: 1.21 MG/DL (ref 0.5–0.9)
CRYSTALS, UA: ABNORMAL /HPF
DIFFERENTIAL TYPE: ABNORMAL
EOSINOPHILS RELATIVE PERCENT: 3 % (ref 1–4)
EPITHELIAL CELLS UA: ABNORMAL /HPF (ref 0–25)
GFR AFRICAN AMERICAN: 53 ML/MIN
GFR NON-AFRICAN AMERICAN: 44 ML/MIN
GFR SERPL CREATININE-BSD FRML MDRD: ABNORMAL ML/MIN/{1.73_M2}
GFR SERPL CREATININE-BSD FRML MDRD: ABNORMAL ML/MIN/{1.73_M2}
GLUCOSE BLD-MCNC: 107 MG/DL (ref 70–99)
GLUCOSE BLD-MCNC: 136 MG/DL (ref 74–100)
GLUCOSE BLD-MCNC: 55 MG/DL (ref 74–100)
GLUCOSE URINE: NEGATIVE
HCT VFR BLD CALC: 41.3 % (ref 36.3–47.1)
HEMOGLOBIN: 13.1 G/DL (ref 11.9–15.1)
IMMATURE GRANULOCYTES: 1 %
KETONES, URINE: NEGATIVE
LACTIC ACID, WHOLE BLOOD: NORMAL MMOL/L (ref 0.7–2.1)
LACTIC ACID: 1.3 MMOL/L (ref 0.5–2.2)
LEUKOCYTE ESTERASE, URINE: NEGATIVE
LIPASE: 24 U/L (ref 13–60)
LYMPHOCYTES # BLD: 29 % (ref 24–43)
MCH RBC QN AUTO: 29.6 PG (ref 25.2–33.5)
MCHC RBC AUTO-ENTMCNC: 31.7 G/DL (ref 28.4–34.8)
MCV RBC AUTO: 93.2 FL (ref 82.6–102.9)
MONOCYTES # BLD: 7 % (ref 3–12)
MUCUS: ABNORMAL
NITRITE, URINE: NEGATIVE
NRBC AUTOMATED: 0 PER 100 WBC
OTHER OBSERVATIONS UA: ABNORMAL
PDW BLD-RTO: 13.5 % (ref 11.8–14.4)
PH UA: 5.5 (ref 5–9)
PLATELET # BLD: 202 K/UL (ref 138–453)
PLATELET ESTIMATE: ABNORMAL
PMV BLD AUTO: 10.4 FL (ref 8.1–13.5)
POTASSIUM SERPL-SCNC: 4.6 MMOL/L (ref 3.7–5.3)
PROTEIN UA: ABNORMAL
RBC # BLD: 4.43 M/UL (ref 3.95–5.11)
RBC # BLD: ABNORMAL 10*6/UL
RBC UA: ABNORMAL /HPF (ref 0–2)
RENAL EPITHELIAL, UA: ABNORMAL /HPF
SEG NEUTROPHILS: 59 % (ref 36–65)
SEGMENTED NEUTROPHILS ABSOLUTE COUNT: 6.83 K/UL (ref 1.5–8.1)
SODIUM BLD-SCNC: 130 MMOL/L (ref 135–144)
SPECIFIC GRAVITY UA: >1.03 (ref 1.01–1.02)
TOTAL PROTEIN: 7.1 G/DL (ref 6.4–8.3)
TRICHOMONAS: ABNORMAL
TURBIDITY: CLEAR
URINE HGB: NEGATIVE
UROBILINOGEN, URINE: NORMAL
WBC # BLD: 11.4 K/UL (ref 3.5–11.3)
WBC # BLD: ABNORMAL 10*3/UL
WBC UA: ABNORMAL /HPF (ref 0–5)
YEAST: ABNORMAL

## 2021-10-16 PROCEDURE — 2500000003 HC RX 250 WO HCPCS: Performed by: NURSE PRACTITIONER

## 2021-10-16 PROCEDURE — 83690 ASSAY OF LIPASE: CPT

## 2021-10-16 PROCEDURE — 85025 COMPLETE CBC W/AUTO DIFF WBC: CPT

## 2021-10-16 PROCEDURE — 6360000002 HC RX W HCPCS: Performed by: NURSE PRACTITIONER

## 2021-10-16 PROCEDURE — 74177 CT ABD & PELVIS W/CONTRAST: CPT

## 2021-10-16 PROCEDURE — 83036 HEMOGLOBIN GLYCOSYLATED A1C: CPT

## 2021-10-16 PROCEDURE — 94664 DEMO&/EVAL PT USE INHALER: CPT

## 2021-10-16 PROCEDURE — 96375 TX/PRO/DX INJ NEW DRUG ADDON: CPT

## 2021-10-16 PROCEDURE — 2580000003 HC RX 258: Performed by: NURSE PRACTITIONER

## 2021-10-16 PROCEDURE — 81001 URINALYSIS AUTO W/SCOPE: CPT

## 2021-10-16 PROCEDURE — 80053 COMPREHEN METABOLIC PANEL: CPT

## 2021-10-16 PROCEDURE — 82947 ASSAY GLUCOSE BLOOD QUANT: CPT

## 2021-10-16 PROCEDURE — 82150 ASSAY OF AMYLASE: CPT

## 2021-10-16 PROCEDURE — 6370000000 HC RX 637 (ALT 250 FOR IP): Performed by: NURSE PRACTITIONER

## 2021-10-16 PROCEDURE — 99284 EMERGENCY DEPT VISIT MOD MDM: CPT

## 2021-10-16 PROCEDURE — 1200000000 HC SEMI PRIVATE

## 2021-10-16 PROCEDURE — 96365 THER/PROPH/DIAG IV INF INIT: CPT

## 2021-10-16 PROCEDURE — 87040 BLOOD CULTURE FOR BACTERIA: CPT

## 2021-10-16 PROCEDURE — 6360000004 HC RX CONTRAST MEDICATION: Performed by: NURSE PRACTITIONER

## 2021-10-16 PROCEDURE — 83605 ASSAY OF LACTIC ACID: CPT

## 2021-10-16 RX ORDER — ONDANSETRON 2 MG/ML
4 INJECTION INTRAMUSCULAR; INTRAVENOUS ONCE
Status: COMPLETED | OUTPATIENT
Start: 2021-10-16 | End: 2021-10-16

## 2021-10-16 RX ORDER — DEXTROSE MONOHYDRATE 25 G/50ML
12.5 INJECTION, SOLUTION INTRAVENOUS PRN
Status: DISCONTINUED | OUTPATIENT
Start: 2021-10-16 | End: 2021-10-20 | Stop reason: HOSPADM

## 2021-10-16 RX ORDER — ONDANSETRON 2 MG/ML
4 INJECTION INTRAMUSCULAR; INTRAVENOUS EVERY 6 HOURS PRN
Status: DISCONTINUED | OUTPATIENT
Start: 2021-10-16 | End: 2021-10-20 | Stop reason: HOSPADM

## 2021-10-16 RX ORDER — MODAFINIL 200 MG/1
300 TABLET ORAL DAILY
Status: DISCONTINUED | OUTPATIENT
Start: 2021-10-17 | End: 2021-10-20 | Stop reason: HOSPADM

## 2021-10-16 RX ORDER — NICOTINE POLACRILEX 4 MG
15 LOZENGE BUCCAL PRN
Status: DISCONTINUED | OUTPATIENT
Start: 2021-10-16 | End: 2021-10-20 | Stop reason: HOSPADM

## 2021-10-16 RX ORDER — ROSUVASTATIN CALCIUM 20 MG/1
20 TABLET, COATED ORAL NIGHTLY
Status: DISCONTINUED | OUTPATIENT
Start: 2021-10-16 | End: 2021-10-20 | Stop reason: HOSPADM

## 2021-10-16 RX ORDER — SODIUM CHLORIDE 0.9 % (FLUSH) 0.9 %
10 SYRINGE (ML) INJECTION EVERY 12 HOURS SCHEDULED
Status: DISCONTINUED | OUTPATIENT
Start: 2021-10-16 | End: 2021-10-20 | Stop reason: HOSPADM

## 2021-10-16 RX ORDER — SODIUM CHLORIDE 9 MG/ML
25 INJECTION, SOLUTION INTRAVENOUS PRN
Status: DISCONTINUED | OUTPATIENT
Start: 2021-10-16 | End: 2021-10-20 | Stop reason: HOSPADM

## 2021-10-16 RX ORDER — LEVOTHYROXINE SODIUM 0.07 MG/1
75 TABLET ORAL DAILY
Status: DISCONTINUED | OUTPATIENT
Start: 2021-10-16 | End: 2021-10-20 | Stop reason: HOSPADM

## 2021-10-16 RX ORDER — ACETAMINOPHEN 325 MG/1
650 TABLET ORAL EVERY 6 HOURS PRN
Status: DISCONTINUED | OUTPATIENT
Start: 2021-10-16 | End: 2021-10-17 | Stop reason: SDUPTHER

## 2021-10-16 RX ORDER — DEXTROSE MONOHYDRATE 50 MG/ML
100 INJECTION, SOLUTION INTRAVENOUS PRN
Status: DISCONTINUED | OUTPATIENT
Start: 2021-10-16 | End: 2021-10-20 | Stop reason: HOSPADM

## 2021-10-16 RX ORDER — TRIAMTERENE AND HYDROCHLOROTHIAZIDE 37.5; 25 MG/1; MG/1
2 TABLET ORAL DAILY
Status: DISCONTINUED | OUTPATIENT
Start: 2021-10-16 | End: 2021-10-20 | Stop reason: HOSPADM

## 2021-10-16 RX ORDER — ONDANSETRON 4 MG/1
4 TABLET, ORALLY DISINTEGRATING ORAL EVERY 8 HOURS PRN
Status: DISCONTINUED | OUTPATIENT
Start: 2021-10-16 | End: 2021-10-20 | Stop reason: HOSPADM

## 2021-10-16 RX ORDER — CIPROFLOXACIN 2 MG/ML
400 INJECTION, SOLUTION INTRAVENOUS EVERY 12 HOURS
Status: DISCONTINUED | OUTPATIENT
Start: 2021-10-16 | End: 2021-10-20 | Stop reason: HOSPADM

## 2021-10-16 RX ORDER — ROSUVASTATIN CALCIUM 20 MG/1
20 TABLET, COATED ORAL DAILY
COMMUNITY

## 2021-10-16 RX ORDER — BUPROPION HYDROCHLORIDE 100 MG/1
200 TABLET ORAL 2 TIMES DAILY
Status: DISCONTINUED | OUTPATIENT
Start: 2021-10-16 | End: 2021-10-20 | Stop reason: HOSPADM

## 2021-10-16 RX ORDER — POLYETHYLENE GLYCOL 3350 17 G/17G
17 POWDER, FOR SOLUTION ORAL DAILY PRN
Status: DISCONTINUED | OUTPATIENT
Start: 2021-10-16 | End: 2021-10-20 | Stop reason: HOSPADM

## 2021-10-16 RX ORDER — ACETAMINOPHEN 650 MG/1
650 SUPPOSITORY RECTAL EVERY 6 HOURS PRN
Status: DISCONTINUED | OUTPATIENT
Start: 2021-10-16 | End: 2021-10-20 | Stop reason: HOSPADM

## 2021-10-16 RX ORDER — MORPHINE SULFATE 2 MG/ML
2 INJECTION, SOLUTION INTRAMUSCULAR; INTRAVENOUS
Status: DISCONTINUED | OUTPATIENT
Start: 2021-10-16 | End: 2021-10-20 | Stop reason: HOSPADM

## 2021-10-16 RX ORDER — MORPHINE SULFATE 4 MG/ML
4 INJECTION, SOLUTION INTRAMUSCULAR; INTRAVENOUS ONCE
Status: COMPLETED | OUTPATIENT
Start: 2021-10-16 | End: 2021-10-16

## 2021-10-16 RX ORDER — LISINOPRIL 10 MG/1
10 TABLET ORAL DAILY
Status: DISCONTINUED | OUTPATIENT
Start: 2021-10-16 | End: 2021-10-20 | Stop reason: HOSPADM

## 2021-10-16 RX ORDER — MODAFINIL 200 MG/1
300 TABLET ORAL DAILY
COMMUNITY

## 2021-10-16 RX ORDER — MORPHINE SULFATE 4 MG/ML
4 INJECTION, SOLUTION INTRAMUSCULAR; INTRAVENOUS
Status: DISCONTINUED | OUTPATIENT
Start: 2021-10-16 | End: 2021-10-20 | Stop reason: HOSPADM

## 2021-10-16 RX ORDER — ALBUTEROL SULFATE 2.5 MG/3ML
2.5 SOLUTION RESPIRATORY (INHALATION) EVERY 6 HOURS PRN
Status: DISCONTINUED | OUTPATIENT
Start: 2021-10-16 | End: 2021-10-20 | Stop reason: HOSPADM

## 2021-10-16 RX ORDER — SODIUM CHLORIDE 9 MG/ML
1000 INJECTION, SOLUTION INTRAVENOUS CONTINUOUS
Status: DISCONTINUED | OUTPATIENT
Start: 2021-10-16 | End: 2021-10-16

## 2021-10-16 RX ORDER — SODIUM CHLORIDE 0.9 % (FLUSH) 0.9 %
10 SYRINGE (ML) INJECTION PRN
Status: DISCONTINUED | OUTPATIENT
Start: 2021-10-16 | End: 2021-10-20 | Stop reason: HOSPADM

## 2021-10-16 RX ORDER — SODIUM CHLORIDE 9 MG/ML
INJECTION, SOLUTION INTRAVENOUS CONTINUOUS
Status: DISCONTINUED | OUTPATIENT
Start: 2021-10-16 | End: 2021-10-17

## 2021-10-16 RX ADMIN — ENOXAPARIN SODIUM 40 MG: 40 INJECTION SUBCUTANEOUS at 15:30

## 2021-10-16 RX ADMIN — IOPAMIDOL 75 ML: 755 INJECTION, SOLUTION INTRAVENOUS at 12:14

## 2021-10-16 RX ADMIN — METRONIDAZOLE 500 MG: 500 INJECTION, SOLUTION INTRAVENOUS at 17:01

## 2021-10-16 RX ADMIN — PIPERACILLIN AND TAZOBACTAM 3375 MG: 3; .375 INJECTION, POWDER, FOR SOLUTION INTRAVENOUS at 13:36

## 2021-10-16 RX ADMIN — SODIUM CHLORIDE 1000 ML: 9 INJECTION, SOLUTION INTRAVENOUS at 12:27

## 2021-10-16 RX ADMIN — MORPHINE SULFATE 4 MG: 4 INJECTION, SOLUTION INTRAMUSCULAR; INTRAVENOUS at 14:08

## 2021-10-16 RX ADMIN — FAMOTIDINE 20 MG: 10 INJECTION, SOLUTION INTRAVENOUS at 15:30

## 2021-10-16 RX ADMIN — ROSUVASTATIN CALCIUM 20 MG: 20 TABLET, FILM COATED ORAL at 20:48

## 2021-10-16 RX ADMIN — LISINOPRIL 10 MG: 10 TABLET ORAL at 20:48

## 2021-10-16 RX ADMIN — ACETAMINOPHEN 650 MG: 325 TABLET ORAL at 18:33

## 2021-10-16 RX ADMIN — ONDANSETRON 4 MG: 2 INJECTION INTRAMUSCULAR; INTRAVENOUS at 12:03

## 2021-10-16 RX ADMIN — TRIAMTERENE AND HYDROCHLOROTHIAZIDE 2 TABLET: 37.5; 25 TABLET ORAL at 20:48

## 2021-10-16 RX ADMIN — BUPROPION HYDROCHLORIDE 200 MG: 100 TABLET, FILM COATED ORAL at 20:48

## 2021-10-16 RX ADMIN — CIPROFLOXACIN 400 MG: 2 INJECTION, SOLUTION INTRAVENOUS at 20:54

## 2021-10-16 RX ADMIN — DEXTROSE MONOHYDRATE 12.5 G: 25 INJECTION, SOLUTION INTRAVENOUS at 19:51

## 2021-10-16 ASSESSMENT — PAIN DESCRIPTION - PAIN TYPE
TYPE: ACUTE PAIN
TYPE: ACUTE PAIN

## 2021-10-16 ASSESSMENT — ENCOUNTER SYMPTOMS
NAUSEA: 1
EYES NEGATIVE: 1
ABDOMINAL PAIN: 1
BLOOD IN STOOL: 0
VOMITING: 0
CONSTIPATION: 1
DIARRHEA: 0
ABDOMINAL DISTENTION: 0
RESPIRATORY NEGATIVE: 1

## 2021-10-16 ASSESSMENT — PAIN DESCRIPTION - ORIENTATION: ORIENTATION: LEFT;LOWER

## 2021-10-16 ASSESSMENT — PAIN SCALES - GENERAL
PAINLEVEL_OUTOF10: 6
PAINLEVEL_OUTOF10: 3
PAINLEVEL_OUTOF10: 0
PAINLEVEL_OUTOF10: 3

## 2021-10-16 ASSESSMENT — PAIN DESCRIPTION - LOCATION
LOCATION: HEAD
LOCATION: ABDOMEN

## 2021-10-16 ASSESSMENT — PAIN DESCRIPTION - FREQUENCY: FREQUENCY: INTERMITTENT

## 2021-10-16 ASSESSMENT — PAIN DESCRIPTION - DESCRIPTORS: DESCRIPTORS: CRAMPING;SHARP

## 2021-10-16 NOTE — ED PROVIDER NOTES
[facility] ED  EMERGENCY DEPARTMENT ENCOUNTER      Pt Name: Aggie Longoria  MRN: [id number]  [place] 1950  Date of evaluation: 10/16/2021  Provider: BEHZAD Son CNP    CHIEF COMPLAINT     Chief Complaint   Patient presents with    Abdominal Pain     LLQ onset Sunday; pain radiates to RLQ         HISTORY OF PRESENT ILLNESS   (Location/Symptom, Timing/Onset, Context/Setting,Quality, Duration, Modifying Factors, Severity)  Note limiting factors. Aggie Longoria is a66 y.o. female who presents to the emergency department     With complaints of abdominal pain. She states she feels that it is her diverticulitis. She complains of abdominal pain across her lower abdomen with the left greater than the right. She states her last BM was 1 week ago however she stated that yesterday she had a very small BM that was passed and was soft. She stated her pain began 1 week ago as well. She stated that she had a low-grade fever however was unable to tell me the value. She complains of intermittent nausea but no vomiting. Denies diarrhea but complains of constipation. She states normally she has bowel movements every day like clockwork. He did complain of some dysuria that began this week as well. Nursing Notes werereviewed. REVIEW OF SYSTEMS    (2-9 systems for level 4, 10 or more for level 5)     Review of Systems   Constitutional: Positive for activity change, appetite change and fever. HENT: Negative. Eyes: Negative. Respiratory: Negative. Cardiovascular: Negative. Gastrointestinal: Positive for abdominal pain, constipation and nausea. Negative for abdominal distention, blood in stool, diarrhea and vomiting. Genitourinary: Positive for dysuria. Musculoskeletal: Negative. Skin: Negative. Neurological: Negative. Psychiatric/Behavioral: Negative. Except as noted above the remainder of the review of systems was reviewed and negative.        PAST MEDICAL HISTORY     Past Medical History:   Diagnosis Date    Chronic kidney disease     Cystic fibrosis ([facility]     Diabetes mellitus ([facility]     Fibromyalgia     Hyperlipidemia     Hypertension     Thyroid disease          SURGICALHISTORY       Past Surgical History:   Procedure Laterality Date    BREAST LUMPECTOMY Left      SECTION      CHOLECYSTECTOMY      COLONOSCOPY  2013    HYSTERECTOMY      [place] INCISE FINGER TENDON SHEATH Right 2018    FINGER TRIGGER RELEASE-RIGHT MIDDLE performed by Ellen Calderon MD at [address]/CARPAL TUNNEL SURG Right 2018    CARPAL TUNNEL RELEASE performed by Ellen Calderon MD at [facility]       Current Discharge Medication List      CONTINUE these medications which have NOT CHANGED    Details   rosuvastatin (CRESTOR) 20 MG tablet Take 20 mg by mouth daily      modafinil (PROVIGIL) 200 MG tablet Take 300 mg by mouth daily.       sodium chloride (PEACE 128) 5 % ophthalmic solution Apply 2 drops to eye as needed      B-D UF III MINI PEN NEEDLES 31G X 5 MM MISC Apply 1 Pen topically 3 times daily      buPROPion (WELLBUTRIN) 100 MG tablet Take 200 mg by mouth 2 times daily       aspirin 81 MG tablet Take 81 mg by mouth daily      cyclobenzaprine (FLEXERIL) 10 MG tablet Take 20 mg by mouth 3 times daily as needed for Muscle spasms      famotidine (PEPCID) 20 MG tablet Take 20 mg by mouth 2 times daily      gabapentin (NEURONTIN) 600 MG tablet Take 1,200 mg by mouth 2 times daily       triamterene-hydrochlorothiazide (MAXZIDE-25) 37.5-25 MG per tablet Take 2 tablets by mouth daily      insulin glargine (LANTUS) 100 UNIT/ML injection vial Inject 78 Units into the skin nightly       levothyroxine (SYNTHROID) 75 MCG tablet Take 75 mcg by mouth Daily Takes 2 tablets on       metFORMIN (GLUCOPHAGE) 1000 MG tablet Take 1,000 mg by mouth 2 times daily (with meals)      metoprolol (LOPRESSOR) 50 MG tablet Take 125 mg by mouth nightly       lisinopril (PRINIVIL;ZESTRIL) 10 MG tablet Take 10 mg by mouth daily      lidocaine (XYLOCAINE) 5 % ointment Apply 1 inch topically daily      albuterol (PROVENTIL) (2.5 MG/3ML) 0.083% nebulizer solution Take 2.5 mg by nebulization every 6 hours as needed for Wheezing                  Patient has no known allergies. FAMILY HISTORY     History reviewed. No pertinent family history. SOCIAL HISTORY       Social History     Socioeconomic History    Marital status:      Spouse name: None    Number of children: None    Years of education: None    Highest education level: None   Occupational History    None   Tobacco Use    Smoking status: Former Smoker    Smokeless tobacco: Never Used   Substance and Sexual Activity    Alcohol use: No    Drug use: No    Sexual activity: None   Other Topics Concern    None   Social History Narrative    None     Social Determinants of Health     Financial Resource Strain:     Difficulty of Paying Living Expenses:    Food Insecurity:     Worried About Running Out of Food in the Last Year:     Ran Out of Food in the Last Year:    Transportation Needs:     Lack of Transportation (Medical):      Lack of Transportation (Non-Medical):    Physical Activity:     Days of Exercise per Week:     Minutes of Exercise per Session:    Stress:     Feeling of Stress :    Social Connections:     Frequency of Communication with Friends and Family:     Frequency of Social Gatherings with Friends and Family:     Attends Sikh Services:     Active Member of Clubs or Organizations:     Attends Club or Organization Meetings:     Marital Status:    Intimate Partner Violence:     Fear of Current or Ex-Partner:     Emotionally Abused:     Physically Abused:     Sexually Abused:        SCREENINGS    [place] Coma Scale  Eye Opening: Spontaneous  Best Verbal Response: Oriented  Best Motor Response: Obeys commands  [place] Coma Scale Score: 15        PHYSICAL EXAM    (up to 7 for level 4, 8 or more for level 5)     ED Triage Vitals [10/16/21 1114]   BP Temp Temp Source Pulse Resp SpO2 Height Weight   132/67 99.1 °F (37.3 °C) Tympanic 79 18 95 % 5' 5\" (1.651 m) 210 lb (95.3 kg)       Physical Exam  Vitals reviewed. Constitutional:       General: She is not in acute distress. Appearance: She is well-developed. She is obese. She is not ill-appearing. HENT:      Head: Normocephalic. Mouth/Throat:      Mouth: Mucous membranes are moist.      Pharynx: Oropharynx is clear. Eyes:      Extraocular Movements: Extraocular movements intact. Pupils: Pupils are equal, round, and reactive to light. Cardiovascular:      Rate and Rhythm: Normal rate and regular rhythm. Heart sounds: Normal heart sounds. Pulmonary:      Effort: Pulmonary effort is normal.      Breath sounds: Normal breath sounds. Abdominal:      General: Bowel sounds are decreased. Palpations: Abdomen is soft. There is no mass. Tenderness: There is generalized abdominal tenderness and tenderness in the left upper quadrant and left lower quadrant. Hernia: No hernia is present. Comments: Left lower quadrant greater than right lower quadrant tenderness   Skin:     General: Skin is warm and dry. Capillary Refill: Capillary refill takes less than 2 seconds. Neurological:      General: No focal deficit present. Mental Status: She is alert and oriented to person, place, and time.    Psychiatric:         Mood and Affect: Mood normal.         DIAGNOSTIC RESULTS     EKG: All EKG's are interpreted by the Emergency Department Physician who either signs orCo-signs this chart in the absence of a cardiologist.        RADIOLOGY:   plain film images such as CT, Ultrasound and MRI are read by the radiologist. Plain radiographic images are visualized and preliminarily interpreted by the emergency physician with the below findings:        Interpretation per the Radiologist below, ifavailable at the time of this note:    CT ABDOMEN PELVIS W IV CONTRAST Additional Contrast? None   Final Result   There is moderate wall thickening with surrounding inflammatory change   involving mid sigmoid colon suggesting focal colitis. Several associated   diverticuli noted in which I favor acute diverticulitis. There is a fluid   collection intimately associated with the affected bowel likely small abscess   measuring 3.2 x 1.8 x 3.9 cm. Several mixed attenuation nodules both kidneys felt represent simple and   complex cyst.      Pulmonary nodule right lung base is unchanged felt to be benign. There is   also linear opacity right lower lung zone similar to prior exam likely   chronic scarring. Large fatty mass right gluteus jp felt represent lipoma unchanged. ED BEDSIDE ULTRASOUND:   Performed by ED Physician - none    LABS:  Labs Reviewed   CBC WITH AUTO DIFFERENTIAL - Abnormal; Notable for the following components:       Result Value    WBC 11.4 (*)     Immature Granulocytes 1 (*)     All other components within normal limits   COMPREHENSIVE METABOLIC PANEL - Abnormal; Notable for the following components:    Glucose 107 (*)     CREATININE 1.21 (*)     Sodium 130 (*)     Chloride 96 (*)     GFR Non- 44 (*)     GFR  53 (*)     All other components within normal limits   URINE RT REFLEX TO CULTURE - Abnormal; Notable for the following components:    Bilirubin Urine SMALL (*)     Specific Gravity, UA >1.030 (*)     Protein, UA TRACE (*)     All other components within normal limits   MICROSCOPIC URINALYSIS - Abnormal; Notable for the following components:    Bacteria, UA 1+ (*)     Mucus, UA 2+ (*)     All other components within normal limits   LACTIC ACID, PLASMA   LIPASE   AMYLASE       All other labs were within normal range ornot returned as of this dictation.     EMERGENCY DEPARTMENT COURSE and DIFFERENTIAL DIAGNOSIS/MDM:   Vitals: Vitals:    10/16/21 1114   BP: 132/67   Pulse: 79   Resp: 18   Temp: 99.1 °F (37.3 °C)   TempSrc: Tympanic   SpO2: 95%   Weight: 210 lb (95.3 kg)   Height: 5' 5\" (1.651 m)           MDM  Number of Diagnoses or Management Options  Diagnosis management comments: Constipation versus diverticulitis versus bowel obstruction versus colon mass versus abscess       Amount and/or Complexity of Data Reviewed  Clinical lab tests: ordered and reviewed  Tests in the radiology section of CPT®: ordered and reviewed  Discuss the patient with other providers: yes (Reviewed with Dr. Sharon Orlando and Dr Bassam Palumbo.)    Risk of Complications, Morbidity, and/or Mortality  Presenting problems: low  Diagnostic procedures: minimal  Management options: minimal  General comments: 1329--spoke with Dr. Sharon Orlando and reviewed the case. Recommendation is to admit to hospitalist.  Keep n.p.o. except medications. Plan for colonoscopy later as an outpatient. Due to abscess being less than  4 cm we will do IV antibiotics unless symptoms worsen. 1331--spoke with Dr. Bassam Palumbo plan to admit. Patient Progress  Patient progress: stable       CRITICAL CARE TIME   Total CriticalCare time was 0 minutes, excluding separately reportable procedures. There was a high probability of clinically significant/life threatening deterioration in the patient's condition which required my urgent intervention. CONSULTS:  IP CONSULT TO PHARMACY    PROCEDURES:  Unlessotherwise noted below, none     Procedures    FINAL IMPRESSION      1. Colonic diverticular abscess Stable         DISPOSITION/PLAN   DISPOSITION Decision To Admit 10/16/2021 01:33:21 PM      PATIENT REFERRED TO:  No follow-up provider specified.     DISCHARGE MEDICATIONS:  Current Discharge Medication List                 (Please note that portions of this note were completed with a voice recognition program.  Efforts were made to edit the dictations but occasionally words are mis-transcribed.)      Holly Chavez BEHZAD Elliott CNP (electronically signed)  Attending Emergency Provider       Terrea Meckel, APRN - CNP  10/16/21 9047

## 2021-10-16 NOTE — CONSULTS
Vancomycin Dosing by Pharmacy - Initial Note   TODAY'S DATE:  10/16/2021  Patient name, age:  Apolinar Rascon, 80 y.o. Indication: SSTI, MRSA suspected. Additional antimicrobials:  Zosyn    Allergies:  Patient has no known allergies. Actual Weight:    Wt Readings from Last 1 Encounters:   10/16/21 210 lb (95.3 kg)     Labs/Ancillary Data  Estimated Creatinine Clearance: 49 mL/min (A) (based on SCr of 1.21 mg/dL (H)). Recent Labs     10/16/21  1124   CREATININE 1.21*   BUN 21   WBC 11.4*     No results found for: PROCAL  No intake or output data in the 24 hours ending 10/16/21 1349  Temp: 99.1    Recent vancomycin administrations        No vancomycin IV orders with administrations found. Orders not given:            vancomycin (VANCOCIN) 1,250 mg in dextrose 5 % 250 mL IVPB                  Culture Date / Source  /  Results  N/a    MRSA Nasal Swab: N/A. Non-respiratory infection. Celia Tejada PLAN   Vancomycin 1250 mg IV every 24 hours. Ensured BUN/sCr ordered at baseline and every 48 hours x at least 3 levels, then at least weekly. Vancomycin level ordered for 10/18/2021 @ 6 am. Will use bayesian method for dosing. This level will not be a trough. Target AUC/PALOMA 400-500, with trough goal estimate of 10-15. Vancomycin Target Concentration Parameters  Treatment  Population Target AUC/PALOMA Target Trough   Invasive MRSA Infection (bacteremia, pneumonia, meningitis, endocarditis, osteomyelitis)  Sepsis (undifferentiated) 400-600 N/A   Infection due to non-MRSA pathogen  Empiric treatment of non-invasive MRSA infection  (SSTI, UTI) <500 10-15 mg/L   CrCl < 29 mL/min  Rapidly fluctuating serum creatinine   WINDY N/A < 15 mg/L     Renal replacement therapy is dosed by levels, per hospital protocol. Abbreviations  * Pauc: probability that AUC is >400 (efficacy); Pconc: probability that Ctrough is above 20 ?g/mL (toxicity);  Tox: Probability of nephrotoxicity, based on Mark long al. Jackie Been Infect Dis 2009.    Thank you for the consult. Pharmacy will continue to follow.   Paradise Enriquez [facility] 10/16/2021, 1:50 PM

## 2021-10-16 NOTE — RT PROTOCOL NOTE
RESPIRATORY ASSESSMENT PROTOCOL                                                                                              Patient Name: Percy Barlow Room#: 9121/[id number] : 1950     Admitting diagnosis: Diverticulitis [K57.92]  Colonic diverticular abscess [K57.20]       Medical History:   Past Medical History:   Diagnosis Date    Chronic kidney disease     Cystic fibrosis ([facility]     Diabetes mellitus ([facility]     Fibromyalgia     Hyperlipidemia     Hypertension     Thyroid disease        PATIENT ASSESSMENT    LABORATORY DATA  Hematology:   Lab Results   Component Value Date    WBC 11.4 10/16/2021    RBC 4.43 10/16/2021    HGB 13.1 10/16/2021    HCT 41.3 10/16/2021     10/16/2021     Chemistry:  No results found for: PHART, KZA0CHW, PO2ART, E4MFWTYV, KJI9QBA, PBEA    VITALS  Pulse: 83   Resp: 18  BP: 117/78  SpO2: 96 % O2 Device: None (Room air)  Temp: 98.5 °F (36.9 °C)    SKIN COLOR  [] Normal  [] Pale  [] Dusky  [] Cyanotic    RESPIRATORY PATTERN  [] Normal  [] Dyspnea  [] Cheyne-Flores  [] Kussmaul  [] Biots    AMBULATORY  [] Yes  [] No  [] With Assistance      Patient Acuity 0 1 2 3 4 Score   Level of Concious (LOC) [x]  Alert & Oriented or Pt normal LOC []  Confused;follows directions []  Confused & uncooper-ative []  Obtunded []  Comatose 0   Respiratory Rate  (RR) [x]  Reg. rate & pattern. 12 - 20 bpm  []  Increased RR. Greater than 20 bpm   []  SOB w/ exertion or RR greater than 24 bpm []  Access- ory muscle use at rest. Abn.  resp. []  SOB at rest.   0   Bilateral Breath Sounds (BBS) [x]  Clear []  Diminish-ed bases  []  Diminish-ed t/o, or rales   []  Sporadic, scattered wheezes or rhonchi []  Persistentwheezes and, or absent BBS 0   Cough [x]  Strong, effective, & non-prod. []  Effective & prod. Less than 25 ml (2 TBSP) over past 24 hrs []  Ineffective & non-prod to less than 25 ML over past 24 hrs []  Ineffective and, or greater than 25 ml sputum prod. past 24 hrs.  []  Nonspon- via spacer TID Albuterol and PRNq4 hrs. If unable to utilize MDI: HHN [physician-ordered bronchodilator(s)] TID and Albuterol PRN q4 hrs. Notify physician if condition deteriorates. MDI THERAPY with  [physician-ordered bronchodilator(s)] via spacer TID PRN. If unable to utilize MDI: HHN [physician-ordered bronchodilator(s)] TID PRN. Notify physician if condition deteriorates. If Acuity Level is 2, 3, or 4 in any of the following:    [] COUGH     [] SURGICAL HISTORY (SURG HX)  [] CHEST XRAY (CXR)    Goal: Improvement in sputum mobilization in patients with ineffective airway clearance. Reverse atelectasis. [] Bronchopulmonary Hygiene Protocol    Total Acuity:   16-32  []  Secondary Assessment in 24 hrs Total Acuity:  9-15  []  Secondary Assessment in 24 hrs Total Acuity:  4-8  []  Secondary Assessment in 48 hrs Total Acuity:  0-3  []  Secondary Assessment in 72 hrs   METANEB QID with [physician-ordered bronchodilator(s)] if CXR Acuity = 4; otherwise:  PD&P, PEP, or Vest QID & PRN  NT Sxn PRN for ineffective cough  METANEB QID with [physician-ordered bronchodilator(s)] if CXR Acuity = 4; otherwise:  PD&P, PEP, or Vest TID & PRN  NT Sxn PRN for ineffective cough  Instruct patient to self-perform IS q1hr WA   Directed Cough self-performed q1hr WA     If Acuity Level is 2 or above in the following:    [] PULMONARY HISTORY (PULM HX)    Goal: Assist patient in quitting smoking to slow or stop the progression of lung disease.     [] Smoking Cessation Protocol    SMOKING CESSATION EDUCATION provided according to policy UR_238: (marah with an X)  ____Yes    ____ No     ____ NA    Smoking Cessation Booklet given:  ____Yes  ____No ____Patient [employer]

## 2021-10-16 NOTE — H&P
cyclobenzaprine (FLEXERIL) 10 MG tablet Take 20 mg by mouth 3 times daily as needed for Muscle spasms   Yes Historical Provider, MD   famotidine (PEPCID) 20 MG tablet Take 20 mg by mouth 2 times daily   Yes Historical Provider, MD   gabapentin (NEURONTIN) 600 MG tablet Take 1,200 mg by mouth 2 times daily    Yes Historical Provider, MD   triamterene-hydrochlorothiazide (MAXZIDE-25) 37.5-25 MG per tablet Take 2 tablets by mouth daily   Yes Historical Provider, MD   insulin glargine (LANTUS) 100 UNIT/ML injection vial Inject 78 Units into the skin nightly    Yes Historical Provider, MD   levothyroxine (SYNTHROID) 75 MCG tablet Take 75 mcg by mouth Daily Takes 2 tablets on sunday   Yes Historical Provider, MD   metFORMIN (GLUCOPHAGE) 1000 MG tablet Take 1,000 mg by mouth 2 times daily (with meals)   Yes Historical Provider, MD   metoprolol (LOPRESSOR) 50 MG tablet Take 125 mg by mouth nightly    Yes Historical Provider, MD   lisinopril (PRINIVIL;ZESTRIL) 10 MG tablet Take 10 mg by mouth daily   Yes Historical Provider, MD   lidocaine (XYLOCAINE) 5 % ointment Apply 1 inch topically daily 4/21/16   Historical Provider, MD   albuterol (PROVENTIL) (2.5 MG/3ML) 0.083% nebulizer solution Take 2.5 mg by nebulization every 6 hours as needed for Wheezing    Historical Provider, MD       Allergies:  Patient has no known allergies. Social History:   TOBACCO:   reports that she quit smoking about 21 years ago. She has never used smokeless tobacco.  ETOH:   reports no history of alcohol use. Family History:   History reviewed. No pertinent family history. Review of Systems:  Constitutional:positive  for fevers, and positive for chills.   Respiratory: negative for shortness of breath, negative for cough, and negative for wheezing  Cardiovascular: negative for chest pain, negative for palpitations, and negative for syncope  Gastrointestinal: positive for abdominal pain, positive for nausea,negative for vomiting, negative for diarrhea, negative for constipation, and negative for hematochezia or melena  Genitourinary: negative for dysuria, negative for urinary urgency, negative for urinary frequency, and negative for hematuria  Neurological: negative for unilateral weakness, numbness or tingling. All other systems were reviewed with the patient and are negative except as stated    Objective:    Vitals:   Temp: 98.5 °F (36.9 °C)  BP: 117/78  Resp: 18  Pulse: 83  SpO2: 96 %  -----------------------------------------------------------------  Exam:  GEN:    Awake, alert and oriented x3. EYES:  EOMI, pupils equal   NECK: Supple. No lymphadenopathy. No carotid bruit  CVS:    regular rate and rhythm, no audible murmur  PULM:  CTA, no wheezes, rales or rhonchi, no acute respiratory distress  ABD:    Bowels sounds hypoactive. Abdomen is soft. No distention. LLQ tenderness to palpation. Has guarding but no rebound. EXT:   no edema bilaterally . No calf tenderness. NEURO: Moves all extremities. Motor and sensory are grossly intact  SKIN:  No rashes.   No skin lesions.    -----------------------------------------------------------------  Diagnostic Data:   · All diagnostic data was reviewed  Lab Results   Component Value Date    WBC 11.4 (H) 10/16/2021    HGB 13.1 10/16/2021    MCV 93.2 10/16/2021     10/16/2021      Lab Results   Component Value Date    GLUCOSE 107 (H) 10/16/2021    BUN 21 10/16/2021    CREATININE 1.21 (H) 10/16/2021     (L) 10/16/2021    K 4.6 10/16/2021    CALCIUM 9.5 10/16/2021    CL 96 (L) 10/16/2021    CO2 24 10/16/2021     Lab Results   Component Value Date    WBCUA 0 TO 2 10/16/2021    RBCUA 10 TO 20 10/16/2021    EPITHUA 5 TO 10 10/16/2021    LEUKOCYTESUR NEGATIVE 10/16/2021    SPECGRAV >1.030 (H) 10/16/2021    GLUCOSEU NEGATIVE 10/16/2021    KETUA NEGATIVE 10/16/2021    PROTEINU TRACE (A) 10/16/2021    HGBUR NEGATIVE 10/16/2021    CASTUA NOT REPORTED 10/16/2021    CRYSTUA NOT REPORTED 10/16/2021    BACTERIA 1+ (A) 10/16/2021    YEAST NOT REPORTED 10/16/2021       Assessment:     Principal Problem:    Colonic diverticular abscess  Active Problems:    Diverticulitis    Type 2 diabetes mellitus with complication, with long-term current use of insulin ([address]    Essential hypertension  Resolved Problems:    * No resolved hospital problems. *      Plan:     Problem List     * (Principal) Colonic diverticular abscess - Primary           · This patient requires inpatient admission because of acute diverticular abscess requitring iv antibiotics  · Factors affecting the medical complexity of this patient include type 2 dm,htn  · Estimated length of stay is 2 days  · Discussed patient's symptoms and data results including labs and imaging studies with the ER MD at time of admission  · High risk drug monitoring: none  ·     CORE MEASURES  · DVT prophylaxis: Lovenox  · Decubitus ulcer present on admission: No  · CODE STATUS: FULL CODE  · Nutrition Status: good   · Physical therapy: Yes   · Old Charts reviewed: Yes  · EKG Reviewed:  Yes  · Advance Directive Addressed: Yes    Christine Hugo MD , M.D.  10/16/2021  3:58 PM

## 2021-10-17 LAB
ABSOLUTE EOS #: 0.21 K/UL (ref 0–0.44)
ABSOLUTE IMMATURE GRANULOCYTE: 0.08 K/UL (ref 0–0.3)
ABSOLUTE LYMPH #: 2.29 K/UL (ref 1.1–3.7)
ABSOLUTE MONO #: 0.52 K/UL (ref 0.1–1.2)
ANION GAP SERPL CALCULATED.3IONS-SCNC: 11 MMOL/L (ref 9–17)
BASOPHILS # BLD: 1 % (ref 0–2)
BASOPHILS ABSOLUTE: 0.04 K/UL (ref 0–0.2)
BUN BLDV-MCNC: 16 MG/DL (ref 8–23)
BUN/CREAT BLD: 15 (ref 9–20)
CALCIUM SERPL-MCNC: 9 MG/DL (ref 8.6–10.4)
CHLORIDE BLD-SCNC: 101 MMOL/L (ref 98–107)
CO2: 24 MMOL/L (ref 20–31)
CREAT SERPL-MCNC: 1.06 MG/DL (ref 0.5–0.9)
DIFFERENTIAL TYPE: ABNORMAL
EOSINOPHILS RELATIVE PERCENT: 3 % (ref 1–4)
ESTIMATED AVERAGE GLUCOSE: 120 MG/DL
GFR AFRICAN AMERICAN: >60 ML/MIN
GFR NON-AFRICAN AMERICAN: 51 ML/MIN
GFR SERPL CREATININE-BSD FRML MDRD: ABNORMAL ML/MIN/{1.73_M2}
GFR SERPL CREATININE-BSD FRML MDRD: ABNORMAL ML/MIN/{1.73_M2}
GLUCOSE BLD-MCNC: 103 MG/DL (ref 74–100)
GLUCOSE BLD-MCNC: 116 MG/DL (ref 74–100)
GLUCOSE BLD-MCNC: 132 MG/DL (ref 74–100)
GLUCOSE BLD-MCNC: 46 MG/DL (ref 74–100)
GLUCOSE BLD-MCNC: 70 MG/DL (ref 74–100)
GLUCOSE BLD-MCNC: 75 MG/DL (ref 70–99)
GLUCOSE BLD-MCNC: 91 MG/DL (ref 74–100)
HBA1C MFR BLD: 5.8 % (ref 4–6)
HCT VFR BLD CALC: 36.5 % (ref 36.3–47.1)
HEMOGLOBIN: 11.3 G/DL (ref 11.9–15.1)
IMMATURE GRANULOCYTES: 1 %
LYMPHOCYTES # BLD: 30 % (ref 24–43)
MCH RBC QN AUTO: 29.7 PG (ref 25.2–33.5)
MCHC RBC AUTO-ENTMCNC: 31 G/DL (ref 28.4–34.8)
MCV RBC AUTO: 96.1 FL (ref 82.6–102.9)
MONOCYTES # BLD: 7 % (ref 3–12)
NRBC AUTOMATED: 0 PER 100 WBC
PDW BLD-RTO: 13.5 % (ref 11.8–14.4)
PLATELET # BLD: 156 K/UL (ref 138–453)
PLATELET ESTIMATE: ABNORMAL
PMV BLD AUTO: 10.4 FL (ref 8.1–13.5)
POTASSIUM SERPL-SCNC: 4.3 MMOL/L (ref 3.7–5.3)
RBC # BLD: 3.8 M/UL (ref 3.95–5.11)
RBC # BLD: ABNORMAL 10*6/UL
SEG NEUTROPHILS: 58 % (ref 36–65)
SEGMENTED NEUTROPHILS ABSOLUTE COUNT: 4.59 K/UL (ref 1.5–8.1)
SODIUM BLD-SCNC: 136 MMOL/L (ref 135–144)
WBC # BLD: 7.7 K/UL (ref 3.5–11.3)
WBC # BLD: ABNORMAL 10*3/UL

## 2021-10-17 PROCEDURE — 82947 ASSAY GLUCOSE BLOOD QUANT: CPT

## 2021-10-17 PROCEDURE — 2500000003 HC RX 250 WO HCPCS: Performed by: NURSE PRACTITIONER

## 2021-10-17 PROCEDURE — 80048 BASIC METABOLIC PNL TOTAL CA: CPT

## 2021-10-17 PROCEDURE — 2700000000 HC OXYGEN THERAPY PER DAY

## 2021-10-17 PROCEDURE — 36415 COLL VENOUS BLD VENIPUNCTURE: CPT

## 2021-10-17 PROCEDURE — 6360000002 HC RX W HCPCS: Performed by: FAMILY MEDICINE

## 2021-10-17 PROCEDURE — 6370000000 HC RX 637 (ALT 250 FOR IP): Performed by: FAMILY MEDICINE

## 2021-10-17 PROCEDURE — 6370000000 HC RX 637 (ALT 250 FOR IP): Performed by: NURSE PRACTITIONER

## 2021-10-17 PROCEDURE — 6360000002 HC RX W HCPCS: Performed by: NURSE PRACTITIONER

## 2021-10-17 PROCEDURE — 1200000000 HC SEMI PRIVATE

## 2021-10-17 PROCEDURE — 94761 N-INVAS EAR/PLS OXIMETRY MLT: CPT

## 2021-10-17 PROCEDURE — 85025 COMPLETE CBC W/AUTO DIFF WBC: CPT

## 2021-10-17 PROCEDURE — 2580000003 HC RX 258: Performed by: FAMILY MEDICINE

## 2021-10-17 RX ORDER — DEXTROSE AND SODIUM CHLORIDE 5; .9 G/100ML; G/100ML
INJECTION, SOLUTION INTRAVENOUS CONTINUOUS
Status: DISCONTINUED | OUTPATIENT
Start: 2021-10-17 | End: 2021-10-19

## 2021-10-17 RX ORDER — ACETAMINOPHEN 500 MG
1000 TABLET ORAL EVERY 6 HOURS PRN
Status: DISCONTINUED | OUTPATIENT
Start: 2021-10-17 | End: 2021-10-20 | Stop reason: HOSPADM

## 2021-10-17 RX ORDER — KETOROLAC TROMETHAMINE 15 MG/ML
15 INJECTION, SOLUTION INTRAMUSCULAR; INTRAVENOUS EVERY 6 HOURS PRN
Status: DISCONTINUED | OUTPATIENT
Start: 2021-10-17 | End: 2021-10-20 | Stop reason: HOSPADM

## 2021-10-17 RX ADMIN — DEXTROSE AND SODIUM CHLORIDE: 5; 900 INJECTION, SOLUTION INTRAVENOUS at 20:45

## 2021-10-17 RX ADMIN — METRONIDAZOLE 500 MG: 500 INJECTION, SOLUTION INTRAVENOUS at 17:10

## 2021-10-17 RX ADMIN — KETOROLAC TROMETHAMINE 15 MG: 15 INJECTION, SOLUTION INTRAMUSCULAR; INTRAVENOUS at 16:33

## 2021-10-17 RX ADMIN — METRONIDAZOLE 500 MG: 500 INJECTION, SOLUTION INTRAVENOUS at 02:09

## 2021-10-17 RX ADMIN — KETOROLAC TROMETHAMINE 15 MG: 15 INJECTION, SOLUTION INTRAMUSCULAR; INTRAVENOUS at 23:32

## 2021-10-17 RX ADMIN — LISINOPRIL 10 MG: 10 TABLET ORAL at 08:26

## 2021-10-17 RX ADMIN — ACETAMINOPHEN 650 MG: 325 TABLET ORAL at 02:17

## 2021-10-17 RX ADMIN — DEXTROSE MONOHYDRATE 12.5 G: 25 INJECTION, SOLUTION INTRAVENOUS at 02:06

## 2021-10-17 RX ADMIN — METRONIDAZOLE 500 MG: 500 INJECTION, SOLUTION INTRAVENOUS at 09:46

## 2021-10-17 RX ADMIN — ACETAMINOPHEN 650 MG: 325 TABLET ORAL at 08:36

## 2021-10-17 RX ADMIN — DEXTROSE AND SODIUM CHLORIDE: 5; 900 INJECTION, SOLUTION INTRAVENOUS at 09:44

## 2021-10-17 RX ADMIN — FAMOTIDINE 20 MG: 10 INJECTION, SOLUTION INTRAVENOUS at 08:26

## 2021-10-17 RX ADMIN — LEVOTHYROXINE SODIUM 75 MCG: 0.07 TABLET ORAL at 06:34

## 2021-10-17 RX ADMIN — ROSUVASTATIN CALCIUM 20 MG: 20 TABLET, FILM COATED ORAL at 20:46

## 2021-10-17 RX ADMIN — BUPROPION HYDROCHLORIDE 200 MG: 100 TABLET, FILM COATED ORAL at 08:26

## 2021-10-17 RX ADMIN — CIPROFLOXACIN 400 MG: 2 INJECTION, SOLUTION INTRAVENOUS at 20:49

## 2021-10-17 RX ADMIN — BUPROPION HYDROCHLORIDE 200 MG: 100 TABLET, FILM COATED ORAL at 20:45

## 2021-10-17 RX ADMIN — METOPROLOL TARTRATE 125 MG: 25 TABLET, FILM COATED ORAL at 20:53

## 2021-10-17 RX ADMIN — TRIAMTERENE AND HYDROCHLOROTHIAZIDE 2 TABLET: 37.5; 25 TABLET ORAL at 10:55

## 2021-10-17 RX ADMIN — CIPROFLOXACIN 400 MG: 2 INJECTION, SOLUTION INTRAVENOUS at 08:34

## 2021-10-17 RX ADMIN — ONDANSETRON 4 MG: 2 INJECTION INTRAMUSCULAR; INTRAVENOUS at 16:02

## 2021-10-17 RX ADMIN — ACETAMINOPHEN 1000 MG: 500 TABLET, FILM COATED ORAL at 19:42

## 2021-10-17 RX ADMIN — ENOXAPARIN SODIUM 40 MG: 40 INJECTION SUBCUTANEOUS at 08:26

## 2021-10-17 RX ADMIN — ONDANSETRON 4 MG: 2 INJECTION INTRAMUSCULAR; INTRAVENOUS at 23:32

## 2021-10-17 ASSESSMENT — PAIN DESCRIPTION - PAIN TYPE
TYPE: ACUTE PAIN

## 2021-10-17 ASSESSMENT — PAIN SCALES - GENERAL
PAINLEVEL_OUTOF10: 6
PAINLEVEL_OUTOF10: 2
PAINLEVEL_OUTOF10: 5
PAINLEVEL_OUTOF10: 6
PAINLEVEL_OUTOF10: 7
PAINLEVEL_OUTOF10: 3
PAINLEVEL_OUTOF10: 3
PAINLEVEL_OUTOF10: 4

## 2021-10-17 ASSESSMENT — PAIN DESCRIPTION - FREQUENCY: FREQUENCY: INTERMITTENT

## 2021-10-17 ASSESSMENT — PAIN DESCRIPTION - LOCATION
LOCATION: ABDOMEN
LOCATION: HEAD
LOCATION: ABDOMEN

## 2021-10-17 ASSESSMENT — PAIN DESCRIPTION - ORIENTATION
ORIENTATION: LOWER
ORIENTATION: LEFT;LOWER

## 2021-10-17 ASSESSMENT — PAIN DESCRIPTION - DESCRIPTORS: DESCRIPTORS: CRAMPING

## 2021-10-17 NOTE — PROGRESS NOTES
Patient called out and said she wears a cpap at home and has not gone without it since 2003. Patient said she forgot to have her son bring hers in. RN told her that we can turn out bipap machines into a cpap but they are full face masks. Patient said she would try it for tonight and have her son bring hers tomorrow morning. Will notify respiratory.

## 2021-10-17 NOTE — PROGRESS NOTES
10/17/2021    CALCIUM 9.0 10/17/2021     10/17/2021    CO2 24 10/17/2021       PROBLEM LIST:  Principal Problem:    Colonic diverticular abscess  Active Problems:    Diverticulitis    Type 2 diabetes mellitus with complication, with long-term current use of insulin ([address]    Essential hypertension  Resolved Problems:    * No resolved hospital problems. *      ASSESSMENT / PLAN:  Colonic diverticular abscess  · Continue current iv antibiotic  therapy  ·   · bodyache and joint pain- discussed possible side effect of cipro,wants to continue cipro for now,if worse,to change to unasyn  · Nutrition status:  Well developed, well nourished with no malnutrition  · DVT prophylaxis: Lovenox   · High risk medications: none   · Disposition:  Discharge plan is home    Francisco Javier Glass MD , M.D.  10/17/2021  10:13 AM

## 2021-10-17 NOTE — PLAN OF CARE
Problem: Pain:  Goal: Control of acute pain  Description: Control of acute pain  Outcome: Ongoing   Pain is adequately controlled, see MAR.

## 2021-10-18 LAB
ABSOLUTE EOS #: 0.11 K/UL (ref 0–0.44)
ABSOLUTE IMMATURE GRANULOCYTE: 0.09 K/UL (ref 0–0.3)
ABSOLUTE LYMPH #: 1.36 K/UL (ref 1.1–3.7)
ABSOLUTE MONO #: 0.46 K/UL (ref 0.1–1.2)
ANION GAP SERPL CALCULATED.3IONS-SCNC: 12 MMOL/L (ref 9–17)
BASOPHILS # BLD: 1 % (ref 0–2)
BASOPHILS ABSOLUTE: 0.03 K/UL (ref 0–0.2)
BUN BLDV-MCNC: 14 MG/DL (ref 8–23)
BUN/CREAT BLD: 14 (ref 9–20)
CALCIUM SERPL-MCNC: 8.9 MG/DL (ref 8.6–10.4)
CHLORIDE BLD-SCNC: 105 MMOL/L (ref 98–107)
CO2: 21 MMOL/L (ref 20–31)
CREAT SERPL-MCNC: 1.01 MG/DL (ref 0.5–0.9)
DIFFERENTIAL TYPE: ABNORMAL
EOSINOPHILS RELATIVE PERCENT: 2 % (ref 1–4)
GFR AFRICAN AMERICAN: >60 ML/MIN
GFR NON-AFRICAN AMERICAN: 54 ML/MIN
GFR SERPL CREATININE-BSD FRML MDRD: ABNORMAL ML/MIN/{1.73_M2}
GFR SERPL CREATININE-BSD FRML MDRD: ABNORMAL ML/MIN/{1.73_M2}
GLUCOSE BLD-MCNC: 116 MG/DL (ref 74–100)
GLUCOSE BLD-MCNC: 127 MG/DL (ref 74–100)
GLUCOSE BLD-MCNC: 140 MG/DL (ref 74–100)
GLUCOSE BLD-MCNC: 156 MG/DL (ref 70–99)
GLUCOSE BLD-MCNC: 79 MG/DL (ref 74–100)
HCT VFR BLD CALC: 36.3 % (ref 36.3–47.1)
HEMOGLOBIN: 11.7 G/DL (ref 11.9–15.1)
IMMATURE GRANULOCYTES: 1 %
LYMPHOCYTES # BLD: 22 % (ref 24–43)
MCH RBC QN AUTO: 29.8 PG (ref 25.2–33.5)
MCHC RBC AUTO-ENTMCNC: 32.2 G/DL (ref 28.4–34.8)
MCV RBC AUTO: 92.4 FL (ref 82.6–102.9)
MONOCYTES # BLD: 7 % (ref 3–12)
NRBC AUTOMATED: 0 PER 100 WBC
PDW BLD-RTO: 13.2 % (ref 11.8–14.4)
PLATELET # BLD: 156 K/UL (ref 138–453)
PLATELET ESTIMATE: ABNORMAL
PMV BLD AUTO: 10.5 FL (ref 8.1–13.5)
POTASSIUM SERPL-SCNC: 4.3 MMOL/L (ref 3.7–5.3)
RBC # BLD: 3.93 M/UL (ref 3.95–5.11)
RBC # BLD: ABNORMAL 10*6/UL
SEG NEUTROPHILS: 67 % (ref 36–65)
SEGMENTED NEUTROPHILS ABSOLUTE COUNT: 4.17 K/UL (ref 1.5–8.1)
SODIUM BLD-SCNC: 138 MMOL/L (ref 135–144)
VANCOMYCIN RANDOM DATE LAST DOSE: NORMAL
VANCOMYCIN RANDOM DOSE AMOUNT: NORMAL
VANCOMYCIN RANDOM TIME LAST DOSE: NORMAL
VANCOMYCIN RANDOM: 4 UG/ML
WBC # BLD: 6.2 K/UL (ref 3.5–11.3)
WBC # BLD: ABNORMAL 10*3/UL

## 2021-10-18 PROCEDURE — 94761 N-INVAS EAR/PLS OXIMETRY MLT: CPT

## 2021-10-18 PROCEDURE — 36415 COLL VENOUS BLD VENIPUNCTURE: CPT

## 2021-10-18 PROCEDURE — 2500000003 HC RX 250 WO HCPCS: Performed by: NURSE PRACTITIONER

## 2021-10-18 PROCEDURE — 1200000000 HC SEMI PRIVATE

## 2021-10-18 PROCEDURE — 80048 BASIC METABOLIC PNL TOTAL CA: CPT

## 2021-10-18 PROCEDURE — 2580000003 HC RX 258: Performed by: FAMILY MEDICINE

## 2021-10-18 PROCEDURE — 6360000002 HC RX W HCPCS: Performed by: NURSE PRACTITIONER

## 2021-10-18 PROCEDURE — 85025 COMPLETE CBC W/AUTO DIFF WBC: CPT

## 2021-10-18 PROCEDURE — 6370000000 HC RX 637 (ALT 250 FOR IP): Performed by: NURSE PRACTITIONER

## 2021-10-18 PROCEDURE — 2580000003 HC RX 258: Performed by: NURSE PRACTITIONER

## 2021-10-18 PROCEDURE — 82947 ASSAY GLUCOSE BLOOD QUANT: CPT

## 2021-10-18 PROCEDURE — 6370000000 HC RX 637 (ALT 250 FOR IP): Performed by: FAMILY MEDICINE

## 2021-10-18 PROCEDURE — 80202 ASSAY OF VANCOMYCIN: CPT

## 2021-10-18 RX ADMIN — DEXTROSE AND SODIUM CHLORIDE: 5; 900 INJECTION, SOLUTION INTRAVENOUS at 08:52

## 2021-10-18 RX ADMIN — BUPROPION HYDROCHLORIDE 200 MG: 100 TABLET, FILM COATED ORAL at 09:05

## 2021-10-18 RX ADMIN — METRONIDAZOLE 500 MG: 500 INJECTION, SOLUTION INTRAVENOUS at 17:29

## 2021-10-18 RX ADMIN — LISINOPRIL 10 MG: 10 TABLET ORAL at 09:05

## 2021-10-18 RX ADMIN — MODAFINIL 300 MG: 200 TABLET ORAL at 09:09

## 2021-10-18 RX ADMIN — METRONIDAZOLE 500 MG: 500 INJECTION, SOLUTION INTRAVENOUS at 10:29

## 2021-10-18 RX ADMIN — METFORMIN HYDROCHLORIDE 1000 MG: 500 TABLET ORAL at 09:10

## 2021-10-18 RX ADMIN — METRONIDAZOLE 500 MG: 500 INJECTION, SOLUTION INTRAVENOUS at 02:02

## 2021-10-18 RX ADMIN — METOPROLOL TARTRATE 125 MG: 25 TABLET, FILM COATED ORAL at 20:26

## 2021-10-18 RX ADMIN — TRIAMTERENE AND HYDROCHLOROTHIAZIDE 2 TABLET: 37.5; 25 TABLET ORAL at 09:05

## 2021-10-18 RX ADMIN — ENOXAPARIN SODIUM 40 MG: 40 INJECTION SUBCUTANEOUS at 09:06

## 2021-10-18 RX ADMIN — BUPROPION HYDROCHLORIDE 200 MG: 100 TABLET, FILM COATED ORAL at 20:25

## 2021-10-18 RX ADMIN — ROSUVASTATIN CALCIUM 20 MG: 20 TABLET, FILM COATED ORAL at 20:25

## 2021-10-18 RX ADMIN — LEVOTHYROXINE SODIUM 75 MCG: 0.07 TABLET ORAL at 07:05

## 2021-10-18 RX ADMIN — SODIUM CHLORIDE, PRESERVATIVE FREE 10 ML: 5 INJECTION INTRAVENOUS at 10:29

## 2021-10-18 RX ADMIN — METFORMIN HYDROCHLORIDE 1000 MG: 500 TABLET ORAL at 17:22

## 2021-10-18 RX ADMIN — INSULIN LISPRO 2 UNITS: 100 INJECTION, SOLUTION INTRAVENOUS; SUBCUTANEOUS at 02:11

## 2021-10-18 RX ADMIN — CIPROFLOXACIN 400 MG: 2 INJECTION, SOLUTION INTRAVENOUS at 20:31

## 2021-10-18 RX ADMIN — ONDANSETRON 4 MG: 4 TABLET, ORALLY DISINTEGRATING ORAL at 15:26

## 2021-10-18 RX ADMIN — CIPROFLOXACIN 400 MG: 2 INJECTION, SOLUTION INTRAVENOUS at 08:52

## 2021-10-18 RX ADMIN — ONDANSETRON 4 MG: 2 INJECTION INTRAMUSCULAR; INTRAVENOUS at 21:48

## 2021-10-18 RX ADMIN — FAMOTIDINE 20 MG: 10 INJECTION, SOLUTION INTRAVENOUS at 10:29

## 2021-10-18 ASSESSMENT — PAIN SCALES - GENERAL
PAINLEVEL_OUTOF10: 0

## 2021-10-18 NOTE — PROGRESS NOTES
Clinical Pharmacy Note  Metformin-IV Contrast Interaction Monitoring  Serum Creatinine Follow-up    Romina Cazares is a 80 y.o. female     Recent Labs     10/17/21  0627 10/18/21  0600   CREATININE 1.06* 1.01*     Estimated Creatinine Clearance: 60 mL/min (A) (based on SCr of 1.01 mg/dL (H)). It is recommended that METFORMIN be temporarily discontinued in patients undergoing radiologic studies in which intravascular iodinated contrast media are utilized. DATE:  Isovue given on 12/16/2021 at  12:14 am.  Authorizing Physician for original order: Venkat Marr CNP    Assessment/Plan:  1. Metformin has been held > 48 hours from time of IV contrast dye administration. 2.  Will re-evaluate GFR in 48 hours and restart if appropriate. If eGFR is greater than 30, it should be safe to resume metformin. Note: previous  recommendations stated the following as safe to administer: sCr  < 1.5 mg/dL in males, or < 1.4 mg/dL in females.     Metformin restarted  BILL Maya [facility], [facility]  10/18/2021  8:18 AM

## 2021-10-18 NOTE — PROGRESS NOTES
Progress Note  Francisca Shah MD    OBJECTIVE:    Patient seen for f/u of Colonic diverticular abscess. She has lt lower abdominal pain . Also has some bodyache and achy joints    ROS:   Constitutional: negative  for fevers, and negative for chills. Respiratory: negative for shortness of breath, negative for cough, and negative for wheezing  Cardiovascular: negative for chest pain, and negative for palpitations  Gastrointestinal: positive for abdominal pain, negative for nausea,negative for vomiting, negative for diarrhea, and negative for constipation     All other systems were reviewed with the patient and are negative unless otherwise stated in HPI    OBJECTIVE:  Vitals:   Temp: 96.8 °F (36 °C)  BP: 129/68  Resp: 18  Pulse: 77  SpO2: 93 %    24HR INTAKE/OUTPUT:    No intake or output data in the 24 hours ending 10/18/21 0752  -----------------------------------------------------------------  Exam:  GEN:    Awake, alert and oriented x3. EYES:  EOMI, pupils equal   NECK: Supple. No lymphadenopathy. No carotid bruit  CVS:    regular rate and rhythm, no audible murmur  PULM:  CTA, no wheezes, rales or rhonchi, no acute respiratory distress  ABD:    Bowels sounds normal.  Abdomen is soft. No distention. LLQ tenderness to palpation. No guarding or rebound  EXT:   no edema bilaterally . No calf tenderness. NEURO: Moves all extremities. Motor and sensory are grossly intact  SKIN:  No rashes.   No skin lesions.    -----------------------------------------------------------------  Diagnostic Data:    · All available data reviewed  Lab Results   Component Value Date    WBC 6.2 10/18/2021    HGB 11.7 (L) 10/18/2021    MCV 92.4 10/18/2021     10/18/2021      Lab Results   Component Value Date    GLUCOSE 156 (H) 10/18/2021    BUN 14 10/18/2021    CREATININE 1.01 (H) 10/18/2021     10/18/2021    K 4.3 10/18/2021    CALCIUM 8.9 10/18/2021     10/18/2021    CO2 21 10/18/2021       PROBLEM LIST:  Principal Problem:    Colonic diverticular abscess  Active Problems:    Diverticulitis    Type 2 diabetes mellitus with complication, with long-term current use of insulin ([address]    Essential hypertension  Resolved Problems:    * No resolved hospital problems. *      ASSESSMENT / PLAN:  Colonic diverticular abscess  · Continue current iv antibiotic  therapy  · Advance diet  · bodyache and joint pain- better   · Type 2 dm- well controlled  · Nutrition status:  Well developed, well nourished with no malnutrition  · DVT prophylaxis: Lovenox   · High risk medications: none   · Disposition:  Discharge plan is home    Marie Bustillo MD , M.D.  10/18/2021  7:52 AM

## 2021-10-18 NOTE — PROGRESS NOTES
Pt sitting up in chair when writer entered the room and watching TV. Pt is A&O x4. Vitals and assessment as charted. IV is starting to infiltrate, writer will remove the IV and restart it. Pt denies any further needs. Call light within reach.

## 2021-10-18 NOTE — PROGRESS NOTES
Patient is resting in bed. Vitals assessment completed at this time. Patient was given tylenol for pain per request. Amish Book walked through patient's medications for the night. Patient denies questions. Will continue to monitor and assess.

## 2021-10-18 NOTE — PROGRESS NOTES
Zofran and Ketorolac given to patient by Vanesa Rosario. Patient having some nausea but feels it is due to not eating. Patient states she also feels like her \"bowels are getting back to normal.\" Writer will continue to monitor and assess.

## 2021-10-18 NOTE — PROGRESS NOTES
Comprehensive Nutrition Assessment    Type and Reason for Visit:  Initial (missing malnutrition screen )    Nutrition Recommendations/Plan:   1. Continue current diet. 2. Progress to low fiber diet as medically appropriate. Nutrition Assessment:  Inadequate oral intakes r/t altered GI aeb constiptation. Last BM was 1 week before admission, loose BM noted since admission. Admitted with colonic diverticular abscess. DM with good control, A1c 5.8. POC glucose dropped to 46 yesterday, has been 116/132 today. Malnutrition Assessment:  Malnutrition Status:  Insufficient data    Context:  Acute Illness     Findings of the 6 clinical characteristics of malnutrition:  Energy Intake:  Unable to assess  Weight Loss:  Unable to assess     Body Fat Loss:  No significant body fat loss     Muscle Mass Loss:  No significant muscle mass loss    Fluid Accumulation:  No significant fluid accumulation     Strength:  Not Performed    Estimated Daily Nutrient Needs:  Energy (kcal):  5463-5525 (15-18/kg); Weight Used for Energy Requirements:  Current     Protein (g):  74-86g (1.3-1.5g/kg); Weight Used for Protein Requirements:  Ideal        Fluid (ml/day):  1746 ml; Method Used for Fluid Requirements:  1 ml/kcal      Nutrition Related Findings:  hypoactive BS x 2 quadrants, active x 2 quadrants      Wounds:  None       Current Nutrition Therapies:    ADULT DIET; Full Liquid    Anthropometric Measures:  · Height: 5' 5\" (165.1 cm)  · Current Body Weight: 213 lb (96.6 kg)   · Admission Body Weight: 213 lb 14.4 oz (97 kg)    · Usual Body Weight: 210 lb (95.3 kg)     · Ideal Body Weight: 125 lbs; % Ideal Body Weight 170.4 %   · BMI: 35.4  · BMI Categories: Obese Class 1 (BMI 30.0-34. 9)       Nutrition Diagnosis:   · Inadequate oral intake related to altered GI function as evidenced by constipation      Nutrition Interventions:   Food and/or Nutrient Delivery:  Continue Current Diet  Nutrition Education/Counseling:  No recommendation at this time   Coordination of Nutrition Care:  Continue to monitor while inpatient    Goals:  PO > 75% of advanced diet       Recent Labs     10/16/21  1124 10/16/21  1124 10/17/21  0627 10/18/21  0600   *  --  136 138   K 4.6  --  4.3 4.3   CL 96*  --  101 105   CO2 24  --  24 21   BUN 21  --  16 14   CREATININE 1.21*  --  1.06* 1.01*   GLUCOSE 107*  --  75 156*   ALT 26  --   --   --    ALKPHOS 87  --   --   --    GFR              < >                          < > = values in this interval not displayed. Lab Results   Component Value Date    LABALBU 3.8 10/16/2021      Nutrition Monitoring and Evaluation:   Behavioral-Environmental Outcomes:  None Identified   Food/Nutrient Intake Outcomes:  Diet Advancement/Tolerance, Food and Nutrient Intake  Physical Signs/Symptoms Outcomes:  Biochemical Data, GI Status, Weight     Discharge Planning:     Too soon to determine     Electronically signed by Phil Cowden, RD, LD on 10/18/21 at 11:00 AM EDT    Contact: [phone]

## 2021-10-18 NOTE — PROGRESS NOTES
Patient is resting in the chair with eyes closed. Patient remains on home cpap with 2L of oxygen bled in. Respirations are regular and even and patient shows no signs of distress. Will continue to monitor and assess.

## 2021-10-18 NOTE — PROGRESS NOTES
Patient resting at this time. Will assess for discharge planning needs in the a.m.     [address], [employer]  10/18/2021

## 2021-10-18 NOTE — PROGRESS NOTES
Physician Progress Note      Tristan Faye  CSN #:                  [id number]  :                       1950  ADMIT DATE:       10/16/2021 11:06 AM  DISCH DATE:  RESPONDING  PROVIDER #:        Daksha Khan MD        QUERY TEXT:    Stage of Chronic Kidney Disease: Please provide further specificity, if known. Clinical indicators include: chronic kidney disease, bun, creatinine, gfr  Options provided:  -- Chronic kidney disease stage 1  -- Chronic kidney disease stage 2  -- Chronic kidney disease stage 3  -- Chronic kidney disease stage 3a  -- Chronic kidney disease stage 3b  -- Chronic kidney disease stage 4  -- Chronic kidney disease stage 5  -- Chronic kidney disease stage 5, requiring dialysis  -- End stage renal disease  -- Other - I will add my own diagnosis  -- Disagree - Not applicable / Not valid  -- Disagree - Clinically Unable to determine / Unknown        PROVIDER RESPONSE TEXT:    The patient has chronic kidney disease stage 2.       Electronically signed by:  Daksha Khan MD 10/18/2021 4:18 PM

## 2021-10-19 LAB
ABSOLUTE EOS #: 0.13 K/UL (ref 0–0.44)
ABSOLUTE IMMATURE GRANULOCYTE: 0.13 K/UL (ref 0–0.3)
ABSOLUTE LYMPH #: 1.82 K/UL (ref 1.1–3.7)
ABSOLUTE MONO #: 0.47 K/UL (ref 0.1–1.2)
ANION GAP SERPL CALCULATED.3IONS-SCNC: 10 MMOL/L (ref 9–17)
BASOPHILS # BLD: 1 % (ref 0–2)
BASOPHILS ABSOLUTE: 0.04 K/UL (ref 0–0.2)
BUN BLDV-MCNC: 11 MG/DL (ref 8–23)
BUN/CREAT BLD: 11 (ref 9–20)
CALCIUM SERPL-MCNC: 8.7 MG/DL (ref 8.6–10.4)
CHLORIDE BLD-SCNC: 104 MMOL/L (ref 98–107)
CO2: 22 MMOL/L (ref 20–31)
CREAT SERPL-MCNC: 0.99 MG/DL (ref 0.5–0.9)
DIFFERENTIAL TYPE: ABNORMAL
EOSINOPHILS RELATIVE PERCENT: 2 % (ref 1–4)
GFR AFRICAN AMERICAN: >60 ML/MIN
GFR NON-AFRICAN AMERICAN: 55 ML/MIN
GFR SERPL CREATININE-BSD FRML MDRD: ABNORMAL ML/MIN/{1.73_M2}
GFR SERPL CREATININE-BSD FRML MDRD: ABNORMAL ML/MIN/{1.73_M2}
GLUCOSE BLD-MCNC: 104 MG/DL (ref 74–100)
GLUCOSE BLD-MCNC: 122 MG/DL (ref 74–100)
GLUCOSE BLD-MCNC: 131 MG/DL (ref 74–100)
GLUCOSE BLD-MCNC: 150 MG/DL (ref 70–99)
GLUCOSE BLD-MCNC: 162 MG/DL (ref 74–100)
GLUCOSE BLD-MCNC: 162 MG/DL (ref 74–100)
GLUCOSE BLD-MCNC: 98 MG/DL (ref 74–100)
HCT VFR BLD CALC: 36.1 % (ref 36.3–47.1)
HEMOGLOBIN: 11.4 G/DL (ref 11.9–15.1)
IMMATURE GRANULOCYTES: 2 %
LYMPHOCYTES # BLD: 26 % (ref 24–43)
MCH RBC QN AUTO: 29.7 PG (ref 25.2–33.5)
MCHC RBC AUTO-ENTMCNC: 31.6 G/DL (ref 28.4–34.8)
MCV RBC AUTO: 94 FL (ref 82.6–102.9)
MONOCYTES # BLD: 7 % (ref 3–12)
NRBC AUTOMATED: 0 PER 100 WBC
PDW BLD-RTO: 13.3 % (ref 11.8–14.4)
PLATELET # BLD: 168 K/UL (ref 138–453)
PLATELET ESTIMATE: ABNORMAL
PMV BLD AUTO: 10.3 FL (ref 8.1–13.5)
POTASSIUM SERPL-SCNC: 4.3 MMOL/L (ref 3.7–5.3)
RBC # BLD: 3.84 M/UL (ref 3.95–5.11)
RBC # BLD: ABNORMAL 10*6/UL
SEG NEUTROPHILS: 62 % (ref 36–65)
SEGMENTED NEUTROPHILS ABSOLUTE COUNT: 4.37 K/UL (ref 1.5–8.1)
SODIUM BLD-SCNC: 136 MMOL/L (ref 135–144)
WBC # BLD: 7 K/UL (ref 3.5–11.3)
WBC # BLD: ABNORMAL 10*3/UL

## 2021-10-19 PROCEDURE — 36415 COLL VENOUS BLD VENIPUNCTURE: CPT

## 2021-10-19 PROCEDURE — 6360000002 HC RX W HCPCS: Performed by: NURSE PRACTITIONER

## 2021-10-19 PROCEDURE — 2500000003 HC RX 250 WO HCPCS: Performed by: NURSE PRACTITIONER

## 2021-10-19 PROCEDURE — 87324 CLOSTRIDIUM AG IA: CPT

## 2021-10-19 PROCEDURE — 85025 COMPLETE CBC W/AUTO DIFF WBC: CPT

## 2021-10-19 PROCEDURE — 6370000000 HC RX 637 (ALT 250 FOR IP): Performed by: FAMILY MEDICINE

## 2021-10-19 PROCEDURE — 2580000003 HC RX 258: Performed by: NURSE PRACTITIONER

## 2021-10-19 PROCEDURE — 6370000000 HC RX 637 (ALT 250 FOR IP): Performed by: NURSE PRACTITIONER

## 2021-10-19 PROCEDURE — 94761 N-INVAS EAR/PLS OXIMETRY MLT: CPT

## 2021-10-19 PROCEDURE — 1200000000 HC SEMI PRIVATE

## 2021-10-19 PROCEDURE — 87449 NOS EACH ORGANISM AG IA: CPT

## 2021-10-19 PROCEDURE — 80048 BASIC METABOLIC PNL TOTAL CA: CPT

## 2021-10-19 RX ORDER — LACTOBACILLUS RHAMNOSUS GG 10B CELL
1 CAPSULE ORAL
Status: DISCONTINUED | OUTPATIENT
Start: 2021-10-19 | End: 2021-10-20 | Stop reason: HOSPADM

## 2021-10-19 RX ORDER — METRONIDAZOLE 500 MG/1
500 TABLET ORAL 3 TIMES DAILY
Qty: 30 TABLET | Refills: 0 | Status: SHIPPED | OUTPATIENT
Start: 2021-10-19 | End: 2021-10-29

## 2021-10-19 RX ORDER — CIPROFLOXACIN 500 MG/1
500 TABLET, FILM COATED ORAL 2 TIMES DAILY
Qty: 20 TABLET | Refills: 0 | Status: SHIPPED | OUTPATIENT
Start: 2021-10-19 | End: 2021-10-29

## 2021-10-19 RX ADMIN — LISINOPRIL 10 MG: 10 TABLET ORAL at 08:37

## 2021-10-19 RX ADMIN — METRONIDAZOLE 500 MG: 500 INJECTION, SOLUTION INTRAVENOUS at 02:10

## 2021-10-19 RX ADMIN — ROSUVASTATIN CALCIUM 20 MG: 20 TABLET, FILM COATED ORAL at 20:49

## 2021-10-19 RX ADMIN — ENOXAPARIN SODIUM 40 MG: 40 INJECTION SUBCUTANEOUS at 08:40

## 2021-10-19 RX ADMIN — METRONIDAZOLE 500 MG: 500 INJECTION, SOLUTION INTRAVENOUS at 10:31

## 2021-10-19 RX ADMIN — ONDANSETRON 4 MG: 2 INJECTION INTRAMUSCULAR; INTRAVENOUS at 06:46

## 2021-10-19 RX ADMIN — METRONIDAZOLE 500 MG: 500 INJECTION, SOLUTION INTRAVENOUS at 17:04

## 2021-10-19 RX ADMIN — BUPROPION HYDROCHLORIDE 200 MG: 100 TABLET, FILM COATED ORAL at 08:37

## 2021-10-19 RX ADMIN — METFORMIN HYDROCHLORIDE 1000 MG: 500 TABLET ORAL at 08:37

## 2021-10-19 RX ADMIN — ONDANSETRON 4 MG: 2 INJECTION INTRAMUSCULAR; INTRAVENOUS at 19:36

## 2021-10-19 RX ADMIN — BUPROPION HYDROCHLORIDE 200 MG: 100 TABLET, FILM COATED ORAL at 20:50

## 2021-10-19 RX ADMIN — CIPROFLOXACIN 400 MG: 2 INJECTION, SOLUTION INTRAVENOUS at 20:56

## 2021-10-19 RX ADMIN — TRIAMTERENE AND HYDROCHLOROTHIAZIDE 2 TABLET: 37.5; 25 TABLET ORAL at 08:37

## 2021-10-19 RX ADMIN — METOPROLOL TARTRATE 125 MG: 25 TABLET, FILM COATED ORAL at 20:49

## 2021-10-19 RX ADMIN — ACETAMINOPHEN 1000 MG: 500 TABLET, FILM COATED ORAL at 06:45

## 2021-10-19 RX ADMIN — METFORMIN HYDROCHLORIDE 1000 MG: 500 TABLET ORAL at 17:03

## 2021-10-19 RX ADMIN — LEVOTHYROXINE SODIUM 75 MCG: 0.07 TABLET ORAL at 06:46

## 2021-10-19 RX ADMIN — FAMOTIDINE 20 MG: 10 INJECTION, SOLUTION INTRAVENOUS at 08:37

## 2021-10-19 RX ADMIN — SODIUM CHLORIDE, PRESERVATIVE FREE 10 ML: 5 INJECTION INTRAVENOUS at 08:41

## 2021-10-19 RX ADMIN — Medication 1 CAPSULE: at 17:03

## 2021-10-19 RX ADMIN — MODAFINIL 300 MG: 200 TABLET ORAL at 08:39

## 2021-10-19 RX ADMIN — CIPROFLOXACIN 400 MG: 2 INJECTION, SOLUTION INTRAVENOUS at 08:45

## 2021-10-19 ASSESSMENT — PAIN DESCRIPTION - DESCRIPTORS: DESCRIPTORS: HEADACHE

## 2021-10-19 ASSESSMENT — PAIN DESCRIPTION - PAIN TYPE: TYPE: ACUTE PAIN

## 2021-10-19 ASSESSMENT — PAIN SCALES - GENERAL
PAINLEVEL_OUTOF10: 5
PAINLEVEL_OUTOF10: 0
PAINLEVEL_OUTOF10: 3

## 2021-10-19 ASSESSMENT — PAIN DESCRIPTION - LOCATION: LOCATION: HEAD

## 2021-10-19 NOTE — PROGRESS NOTES
Met with Patient this a.m. to discuss discharge planning. Patient is a 80year old , white female, admitted with a diagnosis of Colonic Diverticular Abscess. Patient is alert and oriented, pleasant and cooperative with this assessment. States that she wishes to be discharged home when deemed medically stable. Patient lives in [facility] and her son lives with her. Patient uses a CPAP machine and home oxygen at home. States that she has a cane but does not use at this point. Patient is independent with her ADL's. She currently utilizes no outside resources or services. Patient reports that her   3 years ago. She is in the process of refinancing her home due to the cut in her monthly income. Patient drives herself and provides for her own transportation needs. PCP is Dr. Danitza Durant from [place]. Patient reports that she does have insurance to help with prescription expenses and needs no further financial assistance at this time. Discharge plan is home when stable. Patient reports that her son will pick her up at discharge. Patient has 3 children, the son who lives with her, a daughter in [place] and another son in [place]. Patient reports that her children are her medical POA and decision makers. She remains a 'Full Code' status at this time. No anticipated needs or concerns are identified by Patient. LSW to monitor and assist with any/all needs as they present.     [address]  10/19/2021

## 2021-10-19 NOTE — PROGRESS NOTES
Progress Note  Francisca Shah MD    OBJECTIVE:    Patient seen for f/u of Colonic diverticular abscess. She has minimal lt lower abdominal pain . Tolerated full liquid diet well    ROS:   Constitutional: negative  for fevers, and negative for chills. Respiratory: negative for shortness of breath, negative for cough, and negative for wheezing  Cardiovascular: negative for chest pain, and negative for palpitations  Gastrointestinal: positive for abdominal pain, negative for nausea,negative for vomiting, negative for diarrhea, and negative for constipation     All other systems were reviewed with the patient and are negative unless otherwise stated in HPI    OBJECTIVE:  Vitals:   Temp: 98.3 °F (36.8 °C)  BP: 138/75  Resp: 16  Pulse: 69  SpO2: 99 %    24HR INTAKE/OUTPUT:      Intake/Output Summary (Last 24 hours) at 10/19/2021 0639  Last data filed at 10/19/2021 0445  Gross per 24 hour   Intake 4081.83 ml   Output --   Net 4081.83 ml     -----------------------------------------------------------------  Exam:  GEN:    Awake, alert and oriented x3. EYES:  EOMI, pupils equal   NECK: Supple. No lymphadenopathy. No carotid bruit  CVS:    regular rate and rhythm, no audible murmur  PULM:  CTA, no wheezes, rales or rhonchi, no acute respiratory distress  ABD:    Bowels sounds normal.  Abdomen is soft. No distention. LLQ tenderness to palpation. No guarding or rebound  EXT:   no edema bilaterally . No calf tenderness. NEURO: Moves all extremities. Motor and sensory are grossly intact  SKIN:  No rashes.   No skin lesions.    -----------------------------------------------------------------  Diagnostic Data:    · All available data reviewed  Lab Results   Component Value Date    WBC 7.0 10/19/2021    HGB 11.4 (L) 10/19/2021    MCV 94.0 10/19/2021     10/19/2021      Lab Results   Component Value Date    GLUCOSE 156 (H) 10/18/2021    BUN 14 10/18/2021    CREATININE 1.01 (H) 10/18/2021     10/18/2021    K 4.3 10/18/2021    CALCIUM 8.9 10/18/2021     10/18/2021    CO2 21 10/18/2021       PROBLEM LIST:  Principal Problem:    Colonic diverticular abscess  Active Problems:    Diverticulitis    Type 2 diabetes mellitus with complication, with long-term current use of insulin ([facility] [address]    Essential hypertension  Resolved Problems:    * No resolved hospital problems. *      ASSESSMENT / PLAN:  Colonic diverticular abscess  · Clinically improved. Afebrile,pain better  · Advance diet and d/c home on oral antibiotics  · Out pt follow up with Dr Mi Nielsen in next few days  · Type 2 dm- well controlled  · Nutrition status:  Well developed, well nourished with no malnutrition  · DVT prophylaxis: Lovenox   · High risk medications: none   · Disposition:  Discharge plan is home    Lawrence Sandoval MD , MLALITA.  10/19/2021  6:39 AM

## 2021-10-19 NOTE — PROGRESS NOTES
Pt called out and stated she was nausea. Pt requested Zofran. Zofran 4mg IV given at this time. Will continue to monitor.

## 2021-10-19 NOTE — CONSULTS
[address] [facility]              Inpatient Medication Education Note                                 Patient admitted for colonic diverticular abscess. Medications reviewed with the patient include:  ciprofloxacin (patient education monograph provided), metronidazole (patient education monograph provided, enoxaparin, enoxaparin, famotidine, ondansetron. Patient education provided when necessary to include potential medication related side effects with acknowledgement of understanding. Thank you. Danitza Lawson.  Cassandra Joshi

## 2021-10-20 VITALS
SYSTOLIC BLOOD PRESSURE: 121 MMHG | HEART RATE: 94 BPM | WEIGHT: 214.6 LBS | HEIGHT: 65 IN | TEMPERATURE: 97.9 F | RESPIRATION RATE: 19 BRPM | BODY MASS INDEX: 35.75 KG/M2 | DIASTOLIC BLOOD PRESSURE: 70 MMHG | OXYGEN SATURATION: 96 %

## 2021-10-20 LAB
ABSOLUTE EOS #: 0.16 K/UL (ref 0–0.44)
ABSOLUTE IMMATURE GRANULOCYTE: 0.13 K/UL (ref 0–0.3)
ABSOLUTE LYMPH #: 2.7 K/UL (ref 1.1–3.7)
ABSOLUTE MONO #: 0.57 K/UL (ref 0.1–1.2)
ANION GAP SERPL CALCULATED.3IONS-SCNC: 11 MMOL/L (ref 9–17)
BASOPHILS # BLD: 1 % (ref 0–2)
BASOPHILS ABSOLUTE: 0.06 K/UL (ref 0–0.2)
BUN BLDV-MCNC: 9 MG/DL (ref 8–23)
BUN/CREAT BLD: 9 (ref 9–20)
C DIFF AG + TOXIN: NEGATIVE
CALCIUM SERPL-MCNC: 8.8 MG/DL (ref 8.6–10.4)
CHLORIDE BLD-SCNC: 105 MMOL/L (ref 98–107)
CO2: 21 MMOL/L (ref 20–31)
CREAT SERPL-MCNC: 0.96 MG/DL (ref 0.5–0.9)
DIFFERENTIAL TYPE: ABNORMAL
EOSINOPHILS RELATIVE PERCENT: 2 % (ref 1–4)
GFR AFRICAN AMERICAN: >60 ML/MIN
GFR NON-AFRICAN AMERICAN: 57 ML/MIN
GFR SERPL CREATININE-BSD FRML MDRD: ABNORMAL ML/MIN/{1.73_M2}
GFR SERPL CREATININE-BSD FRML MDRD: ABNORMAL ML/MIN/{1.73_M2}
GLUCOSE BLD-MCNC: 109 MG/DL (ref 74–100)
GLUCOSE BLD-MCNC: 125 MG/DL (ref 70–99)
GLUCOSE BLD-MCNC: 89 MG/DL (ref 74–100)
GLUCOSE BLD-MCNC: 98 MG/DL (ref 74–100)
HCT VFR BLD CALC: 36.6 % (ref 36.3–47.1)
HEMOGLOBIN: 11.8 G/DL (ref 11.9–15.1)
IMMATURE GRANULOCYTES: 1 %
LYMPHOCYTES # BLD: 29 % (ref 24–43)
MCH RBC QN AUTO: 29.9 PG (ref 25.2–33.5)
MCHC RBC AUTO-ENTMCNC: 32.2 G/DL (ref 28.4–34.8)
MCV RBC AUTO: 92.7 FL (ref 82.6–102.9)
MONOCYTES # BLD: 6 % (ref 3–12)
NRBC AUTOMATED: 0 PER 100 WBC
PDW BLD-RTO: 13.2 % (ref 11.8–14.4)
PLATELET # BLD: 212 K/UL (ref 138–453)
PLATELET ESTIMATE: ABNORMAL
PMV BLD AUTO: 10.2 FL (ref 8.1–13.5)
POTASSIUM SERPL-SCNC: 3.8 MMOL/L (ref 3.7–5.3)
RBC # BLD: 3.95 M/UL (ref 3.95–5.11)
RBC # BLD: ABNORMAL 10*6/UL
SEG NEUTROPHILS: 61 % (ref 36–65)
SEGMENTED NEUTROPHILS ABSOLUTE COUNT: 5.7 K/UL (ref 1.5–8.1)
SODIUM BLD-SCNC: 137 MMOL/L (ref 135–144)
SPECIMEN DESCRIPTION: NORMAL
WBC # BLD: 9.3 K/UL (ref 3.5–11.3)
WBC # BLD: ABNORMAL 10*3/UL

## 2021-10-20 PROCEDURE — 36415 COLL VENOUS BLD VENIPUNCTURE: CPT

## 2021-10-20 PROCEDURE — 2580000003 HC RX 258: Performed by: NURSE PRACTITIONER

## 2021-10-20 PROCEDURE — 85025 COMPLETE CBC W/AUTO DIFF WBC: CPT

## 2021-10-20 PROCEDURE — 2500000003 HC RX 250 WO HCPCS: Performed by: NURSE PRACTITIONER

## 2021-10-20 PROCEDURE — 82947 ASSAY GLUCOSE BLOOD QUANT: CPT

## 2021-10-20 PROCEDURE — 99222 1ST HOSP IP/OBS MODERATE 55: CPT | Performed by: SURGERY

## 2021-10-20 PROCEDURE — 6360000002 HC RX W HCPCS: Performed by: NURSE PRACTITIONER

## 2021-10-20 PROCEDURE — 80048 BASIC METABOLIC PNL TOTAL CA: CPT

## 2021-10-20 PROCEDURE — 6370000000 HC RX 637 (ALT 250 FOR IP): Performed by: FAMILY MEDICINE

## 2021-10-20 PROCEDURE — 94761 N-INVAS EAR/PLS OXIMETRY MLT: CPT

## 2021-10-20 PROCEDURE — 6370000000 HC RX 637 (ALT 250 FOR IP): Performed by: NURSE PRACTITIONER

## 2021-10-20 RX ADMIN — TRIAMTERENE AND HYDROCHLOROTHIAZIDE 2 TABLET: 37.5; 25 TABLET ORAL at 08:23

## 2021-10-20 RX ADMIN — ACETAMINOPHEN 1000 MG: 500 TABLET, FILM COATED ORAL at 11:22

## 2021-10-20 RX ADMIN — MODAFINIL 300 MG: 200 TABLET ORAL at 08:31

## 2021-10-20 RX ADMIN — METFORMIN HYDROCHLORIDE 1000 MG: 500 TABLET ORAL at 16:45

## 2021-10-20 RX ADMIN — Medication 1 CAPSULE: at 07:02

## 2021-10-20 RX ADMIN — ONDANSETRON 4 MG: 2 INJECTION INTRAMUSCULAR; INTRAVENOUS at 07:02

## 2021-10-20 RX ADMIN — LISINOPRIL 10 MG: 10 TABLET ORAL at 08:24

## 2021-10-20 RX ADMIN — METRONIDAZOLE 500 MG: 500 INJECTION, SOLUTION INTRAVENOUS at 02:27

## 2021-10-20 RX ADMIN — LEVOTHYROXINE SODIUM 75 MCG: 0.07 TABLET ORAL at 07:02

## 2021-10-20 RX ADMIN — ENOXAPARIN SODIUM 40 MG: 40 INJECTION SUBCUTANEOUS at 08:24

## 2021-10-20 RX ADMIN — METRONIDAZOLE 500 MG: 500 INJECTION, SOLUTION INTRAVENOUS at 09:32

## 2021-10-20 RX ADMIN — BUPROPION HYDROCHLORIDE 200 MG: 100 TABLET, FILM COATED ORAL at 08:23

## 2021-10-20 RX ADMIN — METFORMIN HYDROCHLORIDE 1000 MG: 500 TABLET ORAL at 07:02

## 2021-10-20 RX ADMIN — METRONIDAZOLE 500 MG: 500 INJECTION, SOLUTION INTRAVENOUS at 17:37

## 2021-10-20 RX ADMIN — SODIUM CHLORIDE, PRESERVATIVE FREE 10 ML: 5 INJECTION INTRAVENOUS at 07:04

## 2021-10-20 RX ADMIN — CIPROFLOXACIN 400 MG: 2 INJECTION, SOLUTION INTRAVENOUS at 08:28

## 2021-10-20 RX ADMIN — FAMOTIDINE 20 MG: 10 INJECTION, SOLUTION INTRAVENOUS at 08:24

## 2021-10-20 ASSESSMENT — PAIN SCALES - GENERAL
PAINLEVEL_OUTOF10: 3
PAINLEVEL_OUTOF10: 0

## 2021-10-20 NOTE — PROGRESS NOTES
Patient in bed at this time. Assessment and vitals complete. Patient is alert and oriented. Patient wearing home CPAP machine. Vitals stable and WDL. Patient CO nausea. Prn Zofran attempted to be administered. Patient IV infiltrated in right arm. Writer inserted 22# in left [facility], [place], operated by [facility] without complication. Brisk blood returned noted. Zofran administered. Call light within reach. Will continue to monitor.

## 2021-10-20 NOTE — PROGRESS NOTES
Patient sitting up in chair at this time with family in room. Writer reassessed patient and took vitals. Patient denied having needs. Patient requested writer to speak with nurse practitioner regarding discharge. Will speak with her. Call light within reach. Will continue to monitor.

## 2021-10-20 NOTE — DISCHARGE SUMMARY
Physician Discharge Summary  Francisca Shah MD       Patient ID:  Estuardo Colby  [id number]  1950    Admission date: 10/16/2021    Discharge date: 10/20/2021     Admitting Physician: Francesco Perez MD     Primary Care Physician: Kaelyn Rascon     Primary Discharge Diagnoses:   Patient Active Problem List    Diagnosis Date Noted    Diverticulitis 10/16/2021    Colonic diverticular abscess 10/16/2021    Type 2 diabetes mellitus with complication, with long-term current use of insulin ([facility] [address] 10/16/2021    Essential hypertension 10/16/2021    Left carpal tunnel syndrome 02/13/2018       Additional Diagnoses:       Diagnosis Date    Chronic kidney disease     Cystic fibrosis ([facility] [address]     Diabetes mellitus ([facility] [address]     Fibromyalgia     Hyperlipidemia     Hypertension     Thyroid disease          Review of Systems:  Constitutional: negative for fevers or chills  Eyes: negative for visual disturbance   ENT: negative for sore throat or nasal congestion  Respiratory: negative for shortness of breath or cough  Cardiovascular: negative for chest pain ,palpitations,pnd,syncope  Gastrointestinal: negative for abd pain, has nausea,no vomiting, diarrhea better,no constipation,hemetemesis,flaquita,blood in stool  Genitourinary: negative for dysuria, urgency ,frequency,hematuria  Integument/breast: negative for skin rash or lesions  Neurological: negative for unilateral weakness, numbness or tingling. Skeletal Muscular: no joint pain,jont swelling,back pain              Physical exam:      -----------------------------------------------------------------  Exam:  GEN:   A & O x3, no apparent distress  EYES: No gross abnormalities.   NECK: normal, supple, no lymphadenopathy,  no carotid bruits  PULM: clear to auscultation bilaterally- no wheezes, rales or rhonchi, normal air movement, no respiratory distress  COR: regular rate & rhythm, no murmurs and no gallops  ABD:  soft, non-tender, non-distended, normal bowel sounds, no masses or organomegaly  EXT:   no cyanosis, clubbing or edema present    NEURO: negative  SKIN:  no rashes or significant lesions  -----------------------------------------------------------------          Hospital Course: The patient was admitted for the above. She was treated with IV cipro and flagyl  And gradually improved over the course of her hospitalization. She had nausea and loose stools, c diff was negative. Her blood sugar and renal function monitored and improved. she had surgical evaluation and was discharged on 10/20/2021 in stable condition    Consultants:    · Gen Surgery- Dr Main High    Procedures:    · none    Complications:   · none    Significant Diagnostic Studies:   CT ABDOMEN PELVIS W IV CONTRAST Additional Contrast? None    Result Date: 10/16/2021  EXAMINATION: CT OF THE ABDOMEN AND PELVIS WITH CONTRAST 10/16/2021 12:10 pm TECHNIQUE: CT of the abdomen and pelvis was performed with the administration of intravenous contrast. Multiplanar reformatted images are provided for review. Dose modulation, iterative reconstruction, and/or weight based adjustment of the mA/kV was utilized to reduce the radiation dose to as low as reasonably achievable. COMPARISON: May 16 20 HISTORY: ORDERING SYSTEM PROVIDED HISTORY: pain TECHNOLOGIST PROVIDED HISTORY: pain Decision Support Exception - unselect if not a suspected or confirmed emergency medical condition->Emergency Medical Condition (MA) FINDINGS: Lower Chest: There is a pulmonary nodule medial right lung base measuring 9 mm unchanged felt to be benign. Linear opacity seen anterior posterior right lung base likely scarring. Organs: There is a tiny low-attenuation lesion centrally in the liver too small to characterize similar to prior exam likely cyst.  Liver and spleen are enhancing normally. The pancreas and adrenal glands within normal limits. Patient status post cholecystectomy.   There are several mixed attenuation nodules involving both kidneys felt represent simple and complicated/hemorrhagic cysts. GI/Bowel: There are no dilated loops of bowel. There is moderate wall thickening involving the sigmoid colon. There are multiple tiny diverticuli involving the colon noted. There is a focal fluid collection intimately associated with the wall of the affected colon measuring 32 x 18 x 39 mm. Moderate surrounding inflammatory stranding noted. Pelvis: There is no free fluid. Urinary bladder is collapsed. Affected bowel and small fluid collection is closely associated with the posterosuperior margin of the bladder. There is bladder wall of thickening. Peritoneum/Retroperitoneum: Moderate atherosclerotic plaque infrarenal abdominal aorta. Bones/Soft Tissues: There is a large lipoma involving right gluteus jp generally measuring 9.5 cm unchanged. There is moderate wall thickening with surrounding inflammatory change involving mid sigmoid colon suggesting focal colitis. Several associated diverticuli noted in which I favor acute diverticulitis. There is a fluid collection intimately associated with the affected bowel likely small abscess measuring 3.2 x 1.8 x 3.9 cm. Several mixed attenuation nodules both kidneys felt represent simple and complex cyst. Pulmonary nodule right lung base is unchanged felt to be benign. There is also linear opacity right lower lung zone similar to prior exam likely chronic scarring. Large fatty mass right gluteus jp felt represent lipoma unchanged.      Recent Results (from the past 96 hour(s))   Glucose, Whole Blood    Collection Time: 10/16/21  7:45 PM   Result Value Ref Range    POC Glucose 55 (L) 74 - 100 mg/dL   Glucose, Whole Blood    Collection Time: 10/16/21  8:01 PM   Result Value Ref Range    POC Glucose 136 (H) 74 - 100 mg/dL   Glucose, Whole Blood    Collection Time: 10/17/21  2:05 AM   Result Value Ref Range    POC Glucose 46 (LL) 74 - 100 mg/dL   Glucose, Whole Blood    Collection Time: 10/17/21  2:17 AM   Result Value Ref Range    POC Glucose 103 (H) 74 - 100 mg/dL   Basic Metabolic Panel w/ Reflex to MG    Collection Time: 10/17/21  6:27 AM   Result Value Ref Range    Glucose 75 70 - 99 mg/dL    BUN 16 8 - 23 mg/dL    CREATININE 1.06 (H) 0.50 - 0.90 mg/dL    Bun/Cre Ratio 15 9 - 20    Calcium 9.0 8.6 - 10.4 mg/dL    Sodium 136 135 - 144 mmol/L    Potassium 4.3 3.7 - 5.3 mmol/L    Chloride 101 98 - 107 mmol/L    CO2 24 20 - 31 mmol/L    Anion Gap 11 9 - 17 mmol/L    GFR Non-African American 51 (L) >60 mL/min    GFR African American >60 >60 mL/min    GFR Comment          GFR Staging         CBC auto differential    Collection Time: 10/17/21  6:27 AM   Result Value Ref Range    WBC 7.7 3.5 - 11.3 k/uL    RBC 3.80 (L) 3.95 - 5.11 m/uL    Hemoglobin 11.3 (L) 11.9 - 15.1 g/dL    Hematocrit 36.5 36.3 - 47.1 %    MCV 96.1 82.6 - 102.9 fL    MCH 29.7 25.2 - 33.5 pg    MCHC 31.0 28.4 - 34.8 g/dL    RDW 13.5 11.8 - 14.4 %    Platelets 539 932 - 618 k/uL    MPV 10.4 8.1 - 13.5 fL    NRBC Automated 0.0 0.0 per 100 WBC    Differential Type NOT REPORTED     Seg Neutrophils 58 36 - 65 %    Lymphocytes 30 24 - 43 %    Monocytes 7 3 - 12 %    Eosinophils % 3 1 - 4 %    Basophils 1 0 - 2 %    Immature Granulocytes 1 (H) 0 %    Segs Absolute 4.59 1.50 - 8.10 k/uL    Absolute Lymph # 2.29 1.10 - 3.70 k/uL    Absolute Mono # 0.52 0.10 - 1.20 k/uL    Absolute Eos # 0.21 0.00 - 0.44 k/uL    Basophils Absolute 0.04 0.00 - 0.20 k/uL    Absolute Immature Granulocyte 0.08 0.00 - 0.30 k/uL    WBC Morphology NOT REPORTED     RBC Morphology NOT REPORTED     Platelet Estimate NOT REPORTED    Glucose, Whole Blood    Collection Time: 10/17/21  6:53 AM   Result Value Ref Range    POC Glucose 70 (L) 74 - 100 mg/dL   Glucose, Whole Blood    Collection Time: 10/17/21 10:54 AM   Result Value Ref Range    POC Glucose 91 74 - 100 mg/dL   Glucose, Whole Blood    Collection Time: 10/17/21  2:03 PM   Result Value Ref Range    POC Glucose 116 (H) 74 - 100 mg/dL   Glucose, Whole Blood    Collection Time: 10/17/21  9:23 PM   Result Value Ref Range    POC Glucose 132 (H) 74 - 100 mg/dL   Glucose, Whole Blood    Collection Time: 10/18/21  2:08 AM   Result Value Ref Range    POC Glucose 162 (H) 74 - 100 mg/dL   Glucose, Whole Blood    Collection Time: 10/18/21  2:08 AM   Result Value Ref Range    POC Glucose 162 (H) 74 - 100 mg/dL   Vancomycin, Random    Collection Time: 10/18/21  6:00 AM   Result Value Ref Range    Vancomycin Rm 4.0 ug/mL    Vancomycin Random Dose amount NOT REPORTED     Vancomycin Random Date last dose NOT REPORTED     Vancomycin Random Time last dose NOT REPORTED    Basic Metabolic Panel w/ Reflex to MG    Collection Time: 10/18/21  6:00 AM   Result Value Ref Range    Glucose 156 (H) 70 - 99 mg/dL    BUN 14 8 - 23 mg/dL    CREATININE 1.01 (H) 0.50 - 0.90 mg/dL    Bun/Cre Ratio 14 9 - 20    Calcium 8.9 8.6 - 10.4 mg/dL    Sodium 138 135 - 144 mmol/L    Potassium 4.3 3.7 - 5.3 mmol/L    Chloride 105 98 - 107 mmol/L    CO2 21 20 - 31 mmol/L    Anion Gap 12 9 - 17 mmol/L    GFR Non-African American 54 (L) >60 mL/min    GFR African American >60 >60 mL/min    GFR Comment          GFR Staging         CBC auto differential    Collection Time: 10/18/21  6:00 AM   Result Value Ref Range    WBC 6.2 3.5 - 11.3 k/uL    RBC 3.93 (L) 3.95 - 5.11 m/uL    Hemoglobin 11.7 (L) 11.9 - 15.1 g/dL    Hematocrit 36.3 36.3 - 47.1 %    MCV 92.4 82.6 - 102.9 fL    MCH 29.8 25.2 - 33.5 pg    MCHC 32.2 28.4 - 34.8 g/dL    RDW 13.2 11.8 - 14.4 %    Platelets 491 775 - 215 k/uL    MPV 10.5 8.1 - 13.5 fL    NRBC Automated 0.0 0.0 per 100 WBC    Differential Type NOT REPORTED     Seg Neutrophils 67 (H) 36 - 65 %    Lymphocytes 22 (L) 24 - 43 %    Monocytes 7 3 - 12 %    Eosinophils % 2 1 - 4 %    Basophils 1 0 - 2 %    Immature Granulocytes 1 (H) 0 %    Segs Absolute 4.17 1.50 - 8.10 k/uL    Absolute Lymph # 1.36 1.10 - 3.70 k/uL    Absolute Mono # 0.46 0.10 - 1.20 k/uL    Absolute Eos # 0.11 0.00 - 0.44 k/uL    Basophils Absolute 0.03 0.00 - 0.20 k/uL    Absolute Immature Granulocyte 0.09 0.00 - 0.30 k/uL    WBC Morphology NOT REPORTED     RBC Morphology NOT REPORTED     Platelet Estimate NOT REPORTED    Glucose, Whole Blood    Collection Time: 10/18/21  7:19 AM   Result Value Ref Range    POC Glucose 140 (H) 74 - 100 mg/dL   Glucose, Whole Blood    Collection Time: 10/18/21 11:17 AM   Result Value Ref Range    POC Glucose 127 (H) 74 - 100 mg/dL   Glucose, Whole Blood    Collection Time: 10/18/21  5:05 PM   Result Value Ref Range    POC Glucose 79 74 - 100 mg/dL   Glucose, Whole Blood    Collection Time: 10/18/21  8:00 PM   Result Value Ref Range    POC Glucose 116 (H) 74 - 100 mg/dL   Basic Metabolic Panel w/ Reflex to MG    Collection Time: 10/19/21  6:15 AM   Result Value Ref Range    Glucose 150 (H) 70 - 99 mg/dL    BUN 11 8 - 23 mg/dL    CREATININE 0.99 (H) 0.50 - 0.90 mg/dL    Bun/Cre Ratio 11 9 - 20    Calcium 8.7 8.6 - 10.4 mg/dL    Sodium 136 135 - 144 mmol/L    Potassium 4.3 3.7 - 5.3 mmol/L    Chloride 104 98 - 107 mmol/L    CO2 22 20 - 31 mmol/L    Anion Gap 10 9 - 17 mmol/L    GFR Non-African American 55 (L) >60 mL/min    GFR African American >60 >60 mL/min    GFR Comment          GFR Staging         CBC auto differential    Collection Time: 10/19/21  6:15 AM   Result Value Ref Range    WBC 7.0 3.5 - 11.3 k/uL    RBC 3.84 (L) 3.95 - 5.11 m/uL    Hemoglobin 11.4 (L) 11.9 - 15.1 g/dL    Hematocrit 36.1 (L) 36.3 - 47.1 %    MCV 94.0 82.6 - 102.9 fL    MCH 29.7 25.2 - 33.5 pg    MCHC 31.6 28.4 - 34.8 g/dL    RDW 13.3 11.8 - 14.4 %    Platelets 727 496 - 523 k/uL    MPV 10.3 8.1 - 13.5 fL    NRBC Automated 0.0 0.0 per 100 WBC    Differential Type NOT REPORTED     Seg Neutrophils 62 36 - 65 %    Lymphocytes 26 24 - 43 %    Monocytes 7 3 - 12 %    Eosinophils % 2 1 - 4 %    Basophils 1 0 - 2 %    Immature Granulocytes 2 (H) 0 %    Segs Absolute 4.37 1.50 - 8.10 k/uL Absolute Lymph # 1.82 1.10 - 3.70 k/uL    Absolute Mono # 0.47 0.10 - 1.20 k/uL    Absolute Eos # 0.13 0.00 - 0.44 k/uL    Basophils Absolute 0.04 0.00 - 0.20 k/uL    Absolute Immature Granulocyte 0.13 0.00 - 0.30 k/uL    WBC Morphology NOT REPORTED     RBC Morphology NOT REPORTED     Platelet Estimate NOT REPORTED    Glucose, Whole Blood    Collection Time: 10/19/21  6:54 AM   Result Value Ref Range    POC Glucose 131 (H) 74 - 100 mg/dL   Glucose, Whole Blood    Collection Time: 10/19/21 11:02 AM   Result Value Ref Range    POC Glucose 122 (H) 74 - 100 mg/dL   Glucose, Whole Blood    Collection Time: 10/19/21  3:57 PM   Result Value Ref Range    POC Glucose 98 74 - 100 mg/dL   C DIFF TOXIN/ANTIGEN    Collection Time: 10/19/21  7:40 PM    Specimen: Stool   Result Value Ref Range    Specimen Description . FECES     C DIFF AG + TOXIN NEGATIVE NEGATIVE   Glucose, Whole Blood    Collection Time: 10/19/21  8:54 PM   Result Value Ref Range    POC Glucose 104 (H) 74 - 100 mg/dL   Basic Metabolic Panel w/ Reflex to MG    Collection Time: 10/20/21  5:50 AM   Result Value Ref Range    Glucose 125 (H) 70 - 99 mg/dL    BUN 9 8 - 23 mg/dL    CREATININE 0.96 (H) 0.50 - 0.90 mg/dL    Bun/Cre Ratio 9 9 - 20    Calcium 8.8 8.6 - 10.4 mg/dL    Sodium 137 135 - 144 mmol/L    Potassium 3.8 3.7 - 5.3 mmol/L    Chloride 105 98 - 107 mmol/L    CO2 21 20 - 31 mmol/L    Anion Gap 11 9 - 17 mmol/L    GFR Non-African American 57 (L) >60 mL/min    GFR African American >60 >60 mL/min    GFR Comment          GFR Staging         CBC auto differential    Collection Time: 10/20/21  5:50 AM   Result Value Ref Range    WBC 9.3 3.5 - 11.3 k/uL    RBC 3.95 3.95 - 5.11 m/uL    Hemoglobin 11.8 (L) 11.9 - 15.1 g/dL    Hematocrit 36.6 36.3 - 47.1 %    MCV 92.7 82.6 - 102.9 fL    MCH 29.9 25.2 - 33.5 pg    MCHC 32.2 28.4 - 34.8 g/dL    RDW 13.2 11.8 - 14.4 %    Platelets 670 697 - 791 k/uL    MPV 10.2 8.1 - 13.5 fL    NRBC Automated 0.0 0.0 per 100 WBC Differential Type NOT REPORTED     Seg Neutrophils 61 36 - 65 %    Lymphocytes 29 24 - 43 %    Monocytes 6 3 - 12 %    Eosinophils % 2 1 - 4 %    Basophils 1 0 - 2 %    Immature Granulocytes 1 (H) 0 %    Segs Absolute 5.70 1.50 - 8.10 k/uL    Absolute Lymph # 2.70 1.10 - 3.70 k/uL    Absolute Mono # 0.57 0.10 - 1.20 k/uL    Absolute Eos # 0.16 0.00 - 0.44 k/uL    Basophils Absolute 0.06 0.00 - 0.20 k/uL    Absolute Immature Granulocyte 0.13 0.00 - 0.30 k/uL    WBC Morphology NOT REPORTED     RBC Morphology NOT REPORTED     Platelet Estimate NOT REPORTED    Glucose, Whole Blood    Collection Time: 10/20/21  7:08 AM   Result Value Ref Range    POC Glucose 109 (H) 74 - 100 mg/dL   Glucose, Whole Blood    Collection Time: 10/20/21 11:19 AM   Result Value Ref Range    POC Glucose 98 74 - 100 mg/dL   Glucose, Whole Blood    Collection Time: 10/20/21  4:44 PM   Result Value Ref Range    POC Glucose 89 74 - 100 mg/dL     ·     Discharge Condition:   · stable    Disposition:   · home    Discharge Medications:     Reji Charles   [place] Medication Instructions [id number]    Printed on:10/20/21 9550   Medication Information                      albuterol (PROVENTIL) (2.5 MG/3ML) 0.083% nebulizer solution  Take 2.5 mg by nebulization every 6 hours as needed for Wheezing             aspirin 81 MG tablet  Take 81 mg by mouth daily             B-D UF III MINI PEN NEEDLES 31G X 5 MM MISC  Apply 1 Pen topically 3 times daily             buPROPion (WELLBUTRIN) 100 MG tablet  Take 200 mg by mouth 2 times daily              ciprofloxacin (CIPRO) 500 MG tablet  Take 1 tablet by mouth 2 times daily for 10 days             cyclobenzaprine (FLEXERIL) 10 MG tablet  Take 20 mg by mouth 3 times daily as needed for Muscle spasms             famotidine (PEPCID) 20 MG tablet  Take 20 mg by mouth 2 times daily             gabapentin (NEURONTIN) 600 MG tablet  Take 1,200 mg by mouth 2 times daily              insulin glargine (LANTUS) 100

## 2021-10-20 NOTE — PROGRESS NOTES
Pt requesting IV zofran at this time. States she is feeling nauseous and does not like the disintegrating tablet.

## 2021-10-20 NOTE — PROGRESS NOTES
Writer spoke with Dr. Bigg Booker about patient being able to be discharged. Dr. Jaycee Becerra currently speaking with patient in the room.

## 2021-10-20 NOTE — PROGRESS NOTES
10/20/2021    CREATININE 0.96 (H) 10/20/2021     10/20/2021    K 3.8 10/20/2021    CALCIUM 8.8 10/20/2021     10/20/2021    CO2 21 10/20/2021       PROBLEM LIST:  Principal Problem:    Colonic diverticular abscess  Active Problems:    Diverticulitis    Type 2 diabetes mellitus with complication, with long-term current use of insulin ([address]    Essential hypertension  Resolved Problems:    * No resolved hospital problems. *      ASSESSMENT / PLAN:  Colonic diverticular abscess  · Clinically improved. Afebrile,pain better  · Diarrhea- await c diff  · Surgical consult  · Type 2 dm- well controlled  · Nutrition status:  Well developed, well nourished with no malnutrition  · DVT prophylaxis: Lovenox   · High risk medications: none   · Disposition:  Discharge plan is home    Yudelka Fried MD , M.D.  10/20/2021  10:11 AM

## 2021-10-20 NOTE — PROGRESS NOTES
Dr. Ciarra Kahn called back at this time. She stated that she will see the patient later this afternoon if Yohannes Samson, NP would like her to. If not, Dr. Ciarra Kahn is okay with discharge after soft diet toleration and sent home on oral atbx.  Will update NP.

## 2021-10-20 NOTE — PROGRESS NOTES
Patient sitting up in chair. Patient denies wanting to get washed up and stated that she did this morning. Patient denies other needs. Will continue to monitor.

## 2021-10-21 LAB
CULTURE: NORMAL
CULTURE: NORMAL
Lab: NORMAL
Lab: NORMAL
SPECIMEN DESCRIPTION: NORMAL
SPECIMEN DESCRIPTION: NORMAL

## 2021-10-21 NOTE — CONSULTS
GENERAL SURGERY CONSULTATION- Inpatient      Patient's Name/ Date of Birth/ Gender: Romina Cazares /  (80 y.o.) / female     PCP: Lou Cochran  Referring: Hospitalist team Yasmine Enciso    History of present Illness:  Patient is a pleasant 80 y.o. female  Admitted to [address] [place] [address] through ER for abdominal pain. She also had diarrhea, C. Diff negative. Stable. She is up sitting in chair when seen and examined. She provides good history. She has had regular colonoscopies since at least age 49, last one in 2017. She reports large family history of colon cancer. She has a personal history of a large polyp removed in piecemeal fashion at age 49 with subsequent repeat colonoscopy 30 days later to removed the remainder of it successfully. She looks a lot younger than 80. She had an abscess contained on CT this admission and is responding well to IV antibiotics and has since been advanced to liquid diet, WBC now normal. She has known history of sigmoid diverticulitis but this is the first hospital admission for it. Her last 4 CT scans abdomen/pelvis on Epic are copied below and reviewed. She denies melena or hematochezia. Denies nausea or vomiting no fevers or chills this admission. Past Medical History:  has a past medical history of Chronic kidney disease, Cystic fibrosis ([facility] [address] Diabetes mellitus ([facility] [address] Family history of colon cancer, Fibromyalgia, History of colon polyps, History of colonic diverticulitis, Hyperlipidemia, Hypertension, Obesity (BMI 30-39.9), and Thyroid disease. Past Surgical History:   Past Surgical History:   Procedure Laterality Date    BREAST LUMPECTOMY Left      SECTION      CHOLECYSTECTOMY      COLONOSCOPY      COLONOSCOPY  2017. tics.     HYSTERECTOMY      MT INCISE FINGER TENDON SHEATH Right 2018    FINGER TRIGGER RELEASE-RIGHT MIDDLE performed by Omkar Greene MD at [facility] N/CARPAL TUNNEL SURG Right 02/13/2018    CARPAL TUNNEL RELEASE performed by Omkar Greene MD at [place] [address] History:  reports that she quit smoking about 21 years ago. She has never used smokeless tobacco. She reports that she does not drink alcohol and does not use drugs. Family History: family history is not on file. Review of Systems:   General: Completed and, except as mentioned above, was negative or noncontributory  Psychological:  Completed and, except as mentioned above, was negative or noncontributory  Ophthalmic:  Completed and, except as mentioned above, was negative or noncontributory  ENT:  Completed and, except as mentioned above, was negative or noncontributory  Allergy and Immunology:  Completed and, except as mentioned above, was negative or noncontributory  Hematological and Lymphatic:  Completed and, except as mentioned above, was negative or noncontributory  Endocrine: Completed and, except as mentioned above, was negative or noncontributory  Breast:  Completed and, except as mentioned above, was negative or noncontributory  Respiratory:  Completed and, except as mentioned above, was negative or noncontributory  Cardiovascular:  Completed and, except as mentioned above, was negative or noncontributory  Gastrointestinal: Completed and, except as mentioned above, was negative or noncontributory  Genito-Urinary:  Completed and, except as mentioned above, was negative or noncontributory  Musculoskeletal:  Completed and, except as mentioned above, was negative or noncontributory  Neurological:  Completed and, except as mentioned above, was negative or noncontributory  Dermatological:  Completed and, except as mentioned above, was negative or noncontributory    Allergies: Patient has no known allergies. Current Meds:No current facility-administered medications for this encounter.     Current Outpatient Medications:     rosuvastatin (CRESTOR) 20 MG tablet, Take 20 mg by mouth daily, Disp: , Rfl:     modafinil (PROVIGIL) 200 MG tablet, Take 300 mg by mouth daily. , Disp: , Rfl:     sodium chloride (PEACE 128) 5 % ophthalmic solution, Apply 2 drops to eye as needed, Disp: , Rfl:     B-D UF III MINI PEN NEEDLES 31G X 5 MM MISC, Apply 1 Pen topically 3 times daily, Disp: , Rfl:     buPROPion (WELLBUTRIN) 100 MG tablet, Take 200 mg by mouth 2 times daily , Disp: , Rfl:     aspirin 81 MG tablet, Take 81 mg by mouth daily, Disp: , Rfl:     cyclobenzaprine (FLEXERIL) 10 MG tablet, Take 20 mg by mouth 3 times daily as needed for Muscle spasms, Disp: , Rfl:     famotidine (PEPCID) 20 MG tablet, Take 20 mg by mouth 2 times daily, Disp: , Rfl:     gabapentin (NEURONTIN) 600 MG tablet, Take 1,200 mg by mouth 2 times daily , Disp: , Rfl:     triamterene-hydrochlorothiazide (MAXZIDE-25) 37.5-25 MG per tablet, Take 2 tablets by mouth daily, Disp: , Rfl:     insulin glargine (LANTUS) 100 UNIT/ML injection vial, Inject 78 Units into the skin nightly , Disp: , Rfl:     levothyroxine (SYNTHROID) 75 MCG tablet, Take 75 mcg by mouth Daily Takes 2 tablets on sunday, Disp: , Rfl:     metFORMIN (GLUCOPHAGE) 1000 MG tablet, Take 1,000 mg by mouth 2 times daily (with meals), Disp: , Rfl:     metoprolol (LOPRESSOR) 50 MG tablet, Take 125 mg by mouth nightly , Disp: , Rfl:     lisinopril (PRINIVIL;ZESTRIL) 10 MG tablet, Take 10 mg by mouth daily, Disp: , Rfl:     lidocaine (XYLOCAINE) 5 % ointment, Apply 1 inch topically daily, Disp: , Rfl:     albuterol (PROVENTIL) (2.5 MG/3ML) 0.083% nebulizer solution, Take 2.5 mg by nebulization every 6 hours as needed for Wheezing, Disp: , Rfl:     Physical Exam:  Vital signs and Nurse's note reviewed. /70   Pulse 94   Temp 97.9 °F (36.6 °C) (Temporal)   Resp 19   Ht 5' 5\" (1.651 m)   Wt 214 lb 9.6 oz (97.3 kg)   SpO2 96%   BMI 35.71 kg/m²    height is 5' 5\" (1.651 m) and weight is 214 lb 9.6 oz (97.3 kg). Her temporal temperature is 97.9 °F (36.6 °C).  Her blood pressure is 121/70 and her pulse is 94. Her respiration is 19 and oxygen saturation is 96%. Gen:  A&Ox3, NAD. Pleasant and cooperative. HEENT: PERRLA, EOMI, no scleral icterus  Neck:  no goiter  CVS: Regular rate and rhythm  Resp: Good bilateral air entry, no active wheezing, no labored breathing  Abd: soft, localized LLQ tenderness without peritonitis  Ext: Moves all extremities, no gross focal motor deficits  Skin: No erythema or ulcerations     Labs:   Lab Results   Component Value Date    WBC 9.3 10/20/2021    HGB 11.8 10/20/2021    HCT 36.6 10/20/2021    MCV 92.7 10/20/2021     10/20/2021     Lab Results   Component Value Date     10/20/2021    K 3.8 10/20/2021     10/20/2021    CO2 21 10/20/2021    BUN 9 10/20/2021    CREATININE 0.96 10/20/2021    GLUCOSE 125 10/20/2021    CALCIUM 8.8 10/20/2021     Lab Results   Component Value Date    ALKPHOS 87 10/16/2021    ALT 26 10/16/2021    AST 20 10/16/2021    PROT 7.1 10/16/2021    BILITOT 0.34 10/16/2021    LABALBU 3.8 10/16/2021     Lab Results   Component Value Date    AMYLASE 35 10/16/2021     Lab Results   Component Value Date    LIPASE 24 10/16/2021     No results found for: INR    Radiologic Studies:  EXAMINATION:   CT OF THE ABDOMEN AND PELVIS WITH CONTRAST 10/16/2021 12:10 pm       TECHNIQUE:   CT of the abdomen and pelvis was performed with the administration of   intravenous contrast. Multiplanar reformatted images are provided for review.    Dose modulation, iterative reconstruction, and/or weight based adjustment of   the mA/kV was utilized to reduce the radiation dose to as low as reasonably   achievable.       COMPARISON:   May 16 20       HISTORY:   ORDERING SYSTEM PROVIDED HISTORY: pain   TECHNOLOGIST PROVIDED HISTORY:       pain   Decision Support Exception - unselect if not a suspected or confirmed   emergency medical condition->Emergency Medical Condition (MA)       FINDINGS:   Lower Chest: There is a pulmonary nodule medial right lung base measuring 9   mm unchanged felt to be benign.  Linear opacity seen anterior posterior right   lung base likely scarring.       Organs: There is a tiny low-attenuation lesion centrally in the liver too   small to characterize similar to prior exam likely cyst.  Liver and spleen   are enhancing normally.  The pancreas and adrenal glands within normal   limits.  Patient status post cholecystectomy. Gweneth Webster are several mixed   attenuation nodules involving both kidneys felt represent simple and   complicated/hemorrhagic cysts.       GI/Bowel: There are no dilated loops of bowel.  There is moderate wall   thickening involving the sigmoid colon.  There are multiple tiny diverticuli   involving the colon noted. Gweneth Webster is a focal fluid collection intimately   associated with the wall of the affected colon measuring 32 x 18 x 39 mm. Moderate surrounding inflammatory stranding noted.       Pelvis: There is no free fluid.  Urinary bladder is collapsed.  Affected   bowel and small fluid collection is closely associated with the   posterosuperior margin of the bladder.  There is bladder wall of thickening.       Peritoneum/Retroperitoneum: Moderate atherosclerotic plaque infrarenal   abdominal aorta.       Bones/Soft Tissues:  There is a large lipoma involving right gluteus jp   generally measuring 9.5 cm unchanged.    CT 10/2021       Impression   There is moderate wall thickening with surrounding inflammatory change   involving mid sigmoid colon suggesting focal colitis.  Several associated   diverticuli noted in which I favor acute diverticulitis. Gweneth Webster is a fluid   collection intimately associated with the affected bowel likely small abscess   measuring 3.2 x 1.8 x 3.9 cm.       Several mixed attenuation nodules both kidneys felt represent simple and   complex cyst.       Pulmonary nodule right lung base is unchanged felt to be benign. Gweneth Webster is   also linear opacity right lower lung zone similar to prior exam likely chronic scarring.       Large fatty mass right gluteus jp felt represent lipoma unchanged       EXAMINATION:   CT OF THE ABDOMEN AND PELVIS WITH CONTRAST 5/16/2020 1:34 am       TECHNIQUE:   CT of the abdomen and pelvis was performed with the administration of   intravenous contrast. Multiplanar reformatted images are provided for review. Dose modulation, iterative reconstruction, and/or weight based adjustment of   the mA/kV was utilized to reduce the radiation dose to as low as reasonably   achievable.       COMPARISON:   06/02/2018       HISTORY:   ORDERING SYSTEM PROVIDED HISTORY: [facility] abdominal pain   TECHNOLOGIST PROVIDED HISTORY:       [facility] abdominal pain       FINDINGS:   Lower Chest: No focal consolidation.  Cystic changes.       Organs: Cholecystectomy.  The visualized portions of the liver, spleen,   pancreas and adrenal glands demonstrate no acute abnormality.  No biliary   ductal dilatation.  The kidneys appear normal in size and demonstrate   symmetric enhancement.  No focal renal mass.  No hydronephrosis. No   perinephric stranding.       GI/Bowel: No bowel dilatation to suggest obstruction.  No evidence of acute   appendicitis.  Diverticulosis of the sigmoid with perhaps slight stranding in   the adjacent mesentery.       Pelvis:  The urinary bladder appears unremarkable.  The pelvic organs   demonstrate no acute abnormality.       Peritoneum/Retroperitoneum: The abdominal aorta is normal in caliber.  No   gross lymphadenopathy, fluid collection, or free air.       Bones/Soft Tissues: No acute findings.           Impression   Perhaps early mild acute diverticulitis.    CT 5/2020     FINAL REPORT       PROCEDURE:  CT abdomen and pelvis without contrast.        TECHNIQUE:  Computerized axial tomography of the abdomen and pelvis was performed without intravenous contrast. This study is performed without intravascular contrast material and its sensitivity for abdominal and pelvic pathology, including neoplasms,    inflammation, abscess, free fluid, thrombosis, arterial dissection and infarction, is reduced compared with a contrast enhanced study.       HISTORY:  Bilateral flank pain.        COMPARISON:  CT abdomen and pelvis 12/12/2016.       FINDINGS:     There is a small nodule in the medial portion of the right lower lobe. This is unchanged from the previous study and is consistent with a benign lesion. There is a linear scar in the right lower lobe. There are some small cysts in the left lower lobe. There are no pleural effusions. The heart size is normal. The liver, pancreas and spleen are grossly normal. Cholecystectomy clips are present. There is no biliary dilatation. The adrenal glands are not enlarged. Both kidneys appear normal in size. There    are small cortical masses in both kidneys. These appear similar to the previous study and are consistent with renal cysts. The abdominal aorta has a normal caliber. There is no retroperitoneal adenopathy. The unopacified gastrointestinal tract is    unremarkable. There are a few diverticula in the descending colon and sigmoid colon. There are no signs of acute diverticulitis. A normal appendix is visible. The bladder is unremarkable. The uterus has been removed. The regional skeleton appears intact.               Impression   Impression:     Benign pulmonary nodule in the right lower lobe. Bilateral renal cysts. Previous cholecystectomy and hysterectomy. Mild colonic diverticulosis. No evidence of acute disease in the abdomen or pelvis.           Electronically Signed By: Wendie Esparza   on  06/02/2018  18:26     FINAL REPORT       EXAM:  CT ABDOMEN PELVIS W IV CONTRAST       HISTORY:  Abdominal pain        TECHNIQUE:  Spiral CT scanning of the abdomen and pelvis after the uneventful administration of IV contrast. Multiplanar reformations.  100 mL Optiray IV.        PRIORS:  None.       FINDINGS:     Abdomen: Visualized lung bases show mild emphysematous and fibrotic change or scarring bilaterally. Partially calcified, juxtapleural nodular density in the posteromedial aspect of right lower lobe measuring 1.3 cm. Noncalcified, nodular density also    noted in the right lower lobe medial aspect and azygoesophageal recess measuring 7 mm.        Liver enlarged and shows mildly decreased attenuation without focal abnormality. Multiple, round hypodense and some isodense foci in the kidneys, largest on the right measuring 1.8 cm. No significant hydronephrosis. Spleen, pancreas and adrenal glands    are without significant abnormality. Gallbladder surgically absent.        Pelvis: Diverticular changes noted in the sigmoid colon, with focal segment of bowel wall and fold thickening and pericolonic fat stranding. Liquefaction or fluid attenuation also noted along the antimesenteric portion of involved sigmoid colon wall    measuring 2.5 x 7 cm.        Remainder of bowel grossly unremarkable. Appendix within normal limits. No significant free peritoneal fluid or apparent adenopathy. Abdominal aorta is non-aneurysmal.        Impression:     1. Findings compatible with sigmoid colon diverticulitis, including liquefaction along involved segment of sigmoid colon wall, which may represent intramural inflammatory collection, abscess or possible necrosis. Clinical correlation and followup    suggested.        2. Findings compatible with hepatomegaly and mild fatty infiltration in the liver.        3. Bilateral renal hypodensities and isodensities possibly representing cysts, but nonspecific. Correlation with renal ultrasound may be confirmatory, as clinically indicated.        4. Nodular density in right lower lobe, indeterminate.  If the patient is low risk (no significant smoking history, no history of malignancy, and a normal immune system), nodules 6-8 mm in size need noncontrast CT chest follow-up in 6-12 months.  If there    is no change at that time, an additional follow-up noncontrast chest CT recommended at 18 to 24 months from today's date.  If the patient is high risk, follow-up noncontrast chest CT in 3-6 months recommended.  If there is no change at that time, an    additional noncontrast chest CT recommended at 18-24 months from today's date. (Based on Fleischner criteria).         Electronically Signed By: Karen Gonzalez   on  12/12/2016  23:00         Impressions/Recommendations:     History of recurrent diverticulitis. First admission for it with localized abscess, responding well to IV antibiotics. History as above. Recommended outpatient colonoscopy 6-8 weeks after discharge. I told her I am happy to do it in [place] or she may followup with her previous endoscopist, last scope in [place]. Discussed plan with primary team and RN. I told her I come to [place] a few days a week from [facility] and could do it here if she would like.      Areli Rod DO, MPH, [address] [place] [address], [address] [place] [address] office [phone]  [place] office [phone]

## 2021-11-24 ENCOUNTER — OFFICE VISIT (OUTPATIENT)
Dept: SURGERY | Age: 71
End: 2021-11-24
Payer: MEDICARE

## 2021-11-24 DIAGNOSIS — K57.92 DIVERTICULITIS: ICD-10-CM

## 2021-11-24 DIAGNOSIS — Z12.11 SCREEN FOR COLON CANCER: Primary | ICD-10-CM

## 2021-11-24 PROCEDURE — 99213 OFFICE O/P EST LOW 20 MIN: CPT | Performed by: SURGERY

## 2021-11-24 PROCEDURE — 99211 OFF/OP EST MAY X REQ PHY/QHP: CPT

## 2021-11-24 NOTE — PROGRESS NOTES
11/24/21 EZ Dietrich is clinically doing better. Tolerating diet, better bowel movements. Advised to proceed with colonoscopy after CT findings on hospital admission, see below. Vitals stable, abdomen soft, no peritonitis or acute issues. Strong family history of colon issues including colon cancer and polyps, she herself had history of large polyps. Denies currently any hematochezia or melena. No obstructive symptoms. Risks and benefits of colonoscopy discussed, scheduled. .      Reference note: 10/20/2021      GENERAL SURGERY CONSULTATION- Inpatient      Patient's Name/ Date of Birth/ Gender: Jose Treviño / [birth year] (80 y.o.) / female     PCP: Chica Duckworth  Referring: Hospitalist team Yasmine Enciso    History of present Illness:  Patient is a pleasant 80 y.o. female  Admitted to [address] [place] [address] through ER for abdominal pain. She also had diarrhea, C. Diff negative. Stable. She is up sitting in chair when seen and examined. She provides good history. She has had regular colonoscopies since at least age 49, last one in [place] 2017. She reports large family history of colon cancer. She has a personal history of a large polyp removed in piecemeal fashion at age 49 with subsequent repeat colonoscopy 30 days later to removed the remainder of it successfully. She looks a lot younger than 80. She had an abscess contained on CT this admission and is responding well to IV antibiotics and has since been advanced to liquid diet, WBC now normal. She has known history of sigmoid diverticulitis but this is the first hospital admission for it. Her last 4 CT scans abdomen/pelvis on Epic are copied below and reviewed. She denies melena or hematochezia. Denies nausea or vomiting no fevers or chills this admission.      Past Medical History:  has a past medical history of Chronic kidney disease, Cystic fibrosis ([facility] [address] Diabetes mellitus ([facility] [address] Family history of colon cancer, Fibromyalgia, History of colon polyps, History of colonic diverticulitis, Hyperlipidemia, Hypertension, Obesity (BMI 30-39.9), and Thyroid disease. Past Surgical History:   Past Surgical History:   Procedure Laterality Date    BREAST LUMPECTOMY Left      SECTION      CHOLECYSTECTOMY      COLONOSCOPY  2013    COLONOSCOPY  2017    [place]. tics.  HYSTERECTOMY      CA INCISE FINGER TENDON SHEATH Right 2018    FINGER TRIGGER RELEASE-RIGHT MIDDLE performed by Marilin Alegria MD at [address]/CARPAL TUNNEL SURG Right 2018    CARPAL TUNNEL RELEASE performed by Marilin Alegria MD at [facility] History:  reports that she quit smoking about 21 years ago. She has never used smokeless tobacco. She reports that she does not drink alcohol and does not use drugs. Family History: family history is not on file.     Review of Systems:   General: Completed and, except as mentioned above, was negative or noncontributory  Psychological:  Completed and, except as mentioned above, was negative or noncontributory  Ophthalmic:  Completed and, except as mentioned above, was negative or noncontributory  ENT:  Completed and, except as mentioned above, was negative or noncontributory  Allergy and Immunology:  Completed and, except as mentioned above, was negative or noncontributory  Hematological and Lymphatic:  Completed and, except as mentioned above, was negative or noncontributory  Endocrine: Completed and, except as mentioned above, was negative or noncontributory  Breast:  Completed and, except as mentioned above, was negative or noncontributory  Respiratory:  Completed and, except as mentioned above, was negative or noncontributory  Cardiovascular:  Completed and, except as mentioned above, was negative or noncontributory  Gastrointestinal: Completed and, except as mentioned above, was negative or noncontributory  Genito-Urinary:  Completed and, except as mentioned above, was negative or noncontributory  Musculoskeletal: Completed and, except as mentioned above, was negative or noncontributory  Neurological:  Completed and, except as mentioned above, was negative or noncontributory  Dermatological:  Completed and, except as mentioned above, was negative or noncontributory    Allergies: Patient has no known allergies. Current Meds:  Current Outpatient Medications:     rosuvastatin (CRESTOR) 20 MG tablet, Take 20 mg by mouth daily, Disp: , Rfl:     modafinil (PROVIGIL) 200 MG tablet, Take 300 mg by mouth daily. , Disp: , Rfl:     lidocaine (XYLOCAINE) 5 % ointment, Apply 1 inch topically daily, Disp: , Rfl:     sodium chloride (PEACE 128) 5 % ophthalmic solution, Apply 2 drops to eye as needed, Disp: , Rfl:     B-D UF III MINI PEN NEEDLES 31G X 5 MM MISC, Apply 1 Pen topically 3 times daily, Disp: , Rfl:     buPROPion (WELLBUTRIN) 100 MG tablet, Take 200 mg by mouth 2 times daily , Disp: , Rfl:     albuterol (PROVENTIL) (2.5 MG/3ML) 0.083% nebulizer solution, Take 2.5 mg by nebulization every 6 hours as needed for Wheezing, Disp: , Rfl:     aspirin 81 MG tablet, Take 81 mg by mouth daily, Disp: , Rfl:     cyclobenzaprine (FLEXERIL) 10 MG tablet, Take 20 mg by mouth 3 times daily as needed for Muscle spasms, Disp: , Rfl:     famotidine (PEPCID) 20 MG tablet, Take 20 mg by mouth 2 times daily, Disp: , Rfl:     gabapentin (NEURONTIN) 600 MG tablet, Take 1,200 mg by mouth 2 times daily , Disp: , Rfl:     triamterene-hydrochlorothiazide (MAXZIDE-25) 37.5-25 MG per tablet, Take 2 tablets by mouth daily, Disp: , Rfl:     insulin glargine (LANTUS) 100 UNIT/ML injection vial, Inject 78 Units into the skin nightly , Disp: , Rfl:     levothyroxine (SYNTHROID) 75 MCG tablet, Take 75 mcg by mouth Daily Takes 2 tablets on sunday, Disp: , Rfl:     metFORMIN (GLUCOPHAGE) 1000 MG tablet, Take 1,000 mg by mouth 2 times daily (with meals), Disp: , Rfl:     metoprolol (LOPRESSOR) 50 MG tablet, Take 125 mg by mouth nightly , Disp: , Rfl:   lisinopril (PRINIVIL;ZESTRIL) 10 MG tablet, Take 10 mg by mouth daily, Disp: , Rfl:     Physical Exam:  Vital signs and Nurse's note reviewed. Wt 209 lb 6.4 oz (95 kg)   BMI 34.85 kg/m²    weight is 209 lb 6.4 oz (95 kg). Gen:  A&Ox3, NAD. Pleasant and cooperative. HEENT: PERRLA, EOMI, no scleral icterus  Neck:  no goiter  CVS: Regular rate and rhythm  Resp: Good bilateral air entry, no active wheezing, no labored breathing  Abd: soft, localized LLQ tenderness without peritonitis  Ext: Moves all extremities, no gross focal motor deficits  Skin: No erythema or ulcerations     Labs:   Lab Results   Component Value Date    WBC 9.3 10/20/2021    HGB 11.8 10/20/2021    HCT 36.6 10/20/2021    MCV 92.7 10/20/2021     10/20/2021     Lab Results   Component Value Date     10/20/2021    K 3.8 10/20/2021     10/20/2021    CO2 21 10/20/2021    BUN 9 10/20/2021    CREATININE 0.96 10/20/2021    GLUCOSE 125 10/20/2021    CALCIUM 8.8 10/20/2021     Lab Results   Component Value Date    ALKPHOS 87 10/16/2021    ALT 26 10/16/2021    AST 20 10/16/2021    PROT 7.1 10/16/2021    BILITOT 0.34 10/16/2021    LABALBU 3.8 10/16/2021     Lab Results   Component Value Date    AMYLASE 35 10/16/2021     Lab Results   Component Value Date    LIPASE 24 10/16/2021     No results found for: INR    Radiologic Studies:  EXAMINATION:   CT OF THE ABDOMEN AND PELVIS WITH CONTRAST 10/16/2021 12:10 pm       TECHNIQUE:   CT of the abdomen and pelvis was performed with the administration of   intravenous contrast. Multiplanar reformatted images are provided for review.    Dose modulation, iterative reconstruction, and/or weight based adjustment of   the mA/kV was utilized to reduce the radiation dose to as low as reasonably   achievable.       COMPARISON:   May 16 20       HISTORY:   ORDERING SYSTEM PROVIDED HISTORY: pain   TECHNOLOGIST PROVIDED HISTORY:       pain   Decision Support Exception - unselect if not a suspected or confirmed   emergency medical condition->Emergency Medical Condition (MA)       FINDINGS:   Lower Chest: There is a pulmonary nodule medial right lung base measuring 9   mm unchanged felt to be benign.  Linear opacity seen anterior posterior right   lung base likely scarring.       Organs: There is a tiny low-attenuation lesion centrally in the liver too   small to characterize similar to prior exam likely cyst.  Liver and spleen   are enhancing normally.  The pancreas and adrenal glands within normal   limits.  Patient status post cholecystectomy. Dar Cogan are several mixed   attenuation nodules involving both kidneys felt represent simple and   complicated/hemorrhagic cysts.       GI/Bowel: There are no dilated loops of bowel.  There is moderate wall   thickening involving the sigmoid colon.  There are multiple tiny diverticuli   involving the colon noted. Dar Cogan is a focal fluid collection intimately   associated with the wall of the affected colon measuring 32 x 18 x 39 mm. Moderate surrounding inflammatory stranding noted.       Pelvis: There is no free fluid.  Urinary bladder is collapsed.  Affected   bowel and small fluid collection is closely associated with the   posterosuperior margin of the bladder.  There is bladder wall of thickening.       Peritoneum/Retroperitoneum: Moderate atherosclerotic plaque infrarenal   abdominal aorta.       Bones/Soft Tissues:  There is a large lipoma involving right gluteus jp   generally measuring 9.5 cm unchanged.    CT 10/2021       Impression   There is moderate wall thickening with surrounding inflammatory change   involving mid sigmoid colon suggesting focal colitis.  Several associated   diverticuli noted in which I favor acute diverticulitis. Dar Cogan is a fluid   collection intimately associated with the affected bowel likely small abscess   measuring 3.2 x 1.8 x 3.9 cm.       Several mixed attenuation nodules both kidneys felt represent simple and   complex cyst.     Pulmonary nodule right lung base is unchanged felt to be benign. Lei Moles is   also linear opacity right lower lung zone similar to prior exam likely   chronic scarring.       Large fatty mass right gluteus jp felt represent lipoma unchanged       EXAMINATION:   CT OF THE ABDOMEN AND PELVIS WITH CONTRAST 5/16/2020 1:34 am       TECHNIQUE:   CT of the abdomen and pelvis was performed with the administration of   intravenous contrast. Multiplanar reformatted images are provided for review. Dose modulation, iterative reconstruction, and/or weight based adjustment of   the mA/kV was utilized to reduce the radiation dose to as low as reasonably   achievable.       COMPARISON:   06/02/2018       HISTORY:   ORDERING SYSTEM PROVIDED HISTORY: [facility] abdominal pain   TECHNOLOGIST PROVIDED HISTORY:       [facility] abdominal pain       FINDINGS:   Lower Chest: No focal consolidation.  Cystic changes.       Organs: Cholecystectomy.  The visualized portions of the liver, spleen,   pancreas and adrenal glands demonstrate no acute abnormality.  No biliary   ductal dilatation.  The kidneys appear normal in size and demonstrate   symmetric enhancement.  No focal renal mass.  No hydronephrosis. No   perinephric stranding.       GI/Bowel: No bowel dilatation to suggest obstruction.  No evidence of acute   appendicitis.  Diverticulosis of the sigmoid with perhaps slight stranding in   the adjacent mesentery.       Pelvis:  The urinary bladder appears unremarkable.  The pelvic organs   demonstrate no acute abnormality.       Peritoneum/Retroperitoneum: The abdominal aorta is normal in caliber.  No   gross lymphadenopathy, fluid collection, or free air.       Bones/Soft Tissues: No acute findings.           Impression   Perhaps early mild acute diverticulitis.    CT 5/2020     FINAL REPORT       PROCEDURE:  CT abdomen and pelvis without contrast.        TECHNIQUE:  Computerized axial tomography of the abdomen and pelvis was performed without intravenous contrast. This study is performed without intravascular contrast material and its sensitivity for abdominal and pelvic pathology, including neoplasms,    inflammation, abscess, free fluid, thrombosis, arterial dissection and infarction, is reduced compared with a contrast enhanced study.       HISTORY:  Bilateral flank pain.        COMPARISON:  CT abdomen and pelvis 12/12/2016.       FINDINGS:     There is a small nodule in the medial portion of the right lower lobe. This is unchanged from the previous study and is consistent with a benign lesion. There is a linear scar in the right lower lobe. There are some small cysts in the left lower lobe. There are no pleural effusions. The heart size is normal. The liver, pancreas and spleen are grossly normal. Cholecystectomy clips are present. There is no biliary dilatation. The adrenal glands are not enlarged. Both kidneys appear normal in size. There    are small cortical masses in both kidneys. These appear similar to the previous study and are consistent with renal cysts. The abdominal aorta has a normal caliber. There is no retroperitoneal adenopathy. The unopacified gastrointestinal tract is    unremarkable. There are a few diverticula in the descending colon and sigmoid colon. There are no signs of acute diverticulitis. A normal appendix is visible. The bladder is unremarkable. The uterus has been removed. The regional skeleton appears intact.               Impression   Impression:     Benign pulmonary nodule in the right lower lobe. Bilateral renal cysts. Previous cholecystectomy and hysterectomy. Mild colonic diverticulosis.  No evidence of acute disease in the abdomen or pelvis.           Electronically Signed By: Feliberto Grimaldo   on  06/02/2018  18:26     FINAL REPORT       EXAM:  CT ABDOMEN PELVIS W IV CONTRAST       HISTORY:  Abdominal pain        TECHNIQUE:  Spiral CT scanning of the abdomen and pelvis after the uneventful administration of IV contrast. Multiplanar reformations. 100 mL Optiray IV.        PRIORS:  None.       FINDINGS:     Abdomen: Visualized lung bases show mild emphysematous and fibrotic change or scarring bilaterally. Partially calcified, juxtapleural nodular density in the posteromedial aspect of right lower lobe measuring 1.3 cm. Noncalcified, nodular density also    noted in the right lower lobe medial aspect and azygoesophageal recess measuring 7 mm.        Liver enlarged and shows mildly decreased attenuation without focal abnormality. Multiple, round hypodense and some isodense foci in the kidneys, largest on the right measuring 1.8 cm. No significant hydronephrosis. Spleen, pancreas and adrenal glands    are without significant abnormality. Gallbladder surgically absent.        Pelvis: Diverticular changes noted in the sigmoid colon, with focal segment of bowel wall and fold thickening and pericolonic fat stranding. Liquefaction or fluid attenuation also noted along the antimesenteric portion of involved sigmoid colon wall    measuring 2.5 x 7 cm.        Remainder of bowel grossly unremarkable. Appendix within normal limits. No significant free peritoneal fluid or apparent adenopathy. Abdominal aorta is non-aneurysmal.        Impression:     1. Findings compatible with sigmoid colon diverticulitis, including liquefaction along involved segment of sigmoid colon wall, which may represent intramural inflammatory collection, abscess or possible necrosis. Clinical correlation and followup    suggested.        2. Findings compatible with hepatomegaly and mild fatty infiltration in the liver.        3. Bilateral renal hypodensities and isodensities possibly representing cysts, but nonspecific. Correlation with renal ultrasound may be confirmatory, as clinically indicated.        4. Nodular density in right lower lobe, indeterminate.  If the patient is low risk (no significant smoking history, no history of malignancy, and a normal immune system), nodules 6-8 mm in size need noncontrast CT chest follow-up in 6-12 months.  If there    is no change at that time, an additional follow-up noncontrast chest CT recommended at 18 to 24 months from today's date.  If the patient is high risk, follow-up noncontrast chest CT in 3-6 months recommended.  If there is no change at that time, an    additional noncontrast chest CT recommended at 18-24 months from today's date. (Based on Fleischner criteria).         Electronically Signed By: Ashley Hadley   on  12/12/2016  23:00         Impressions/Recommendations:     History of recurrent diverticulitis. First admission for it with localized abscess, responding well to IV antibiotics. History as above. Recommended outpatient colonoscopy 6-8 weeks after discharge. I told her I am happy to do it in [place] or she may followup with her previous endoscopist, last scope in [place]. Discussed plan with primary team and RN. I told her I come to [place] a few days a week from [place] and could do it here if she would like.      Lisa Ellsworth DO, MPH, [facility]0 [facility] 13 Surgery, [address] [place] [address] Rd office [phone]  [place] office [phone]

## 2021-11-25 VITALS
DIASTOLIC BLOOD PRESSURE: 74 MMHG | TEMPERATURE: 98 F | WEIGHT: 209.4 LBS | HEART RATE: 80 BPM | SYSTOLIC BLOOD PRESSURE: 124 MMHG | BODY MASS INDEX: 34.85 KG/M2 | RESPIRATION RATE: 18 BRPM

## 2021-12-15 ENCOUNTER — ANESTHESIA EVENT (OUTPATIENT)
Dept: OPERATING ROOM | Age: 71
End: 2021-12-15
Payer: MEDICARE

## 2021-12-15 NOTE — PROGRESS NOTES
Pre-surgical testing requesting patient chart to be reviewed prior to 12/17/21 colonoscopy. After review of patients chart, she is cleared to undergo anesthesia for this procedure.

## 2021-12-17 ENCOUNTER — ANESTHESIA (OUTPATIENT)
Dept: OPERATING ROOM | Age: 71
End: 2021-12-17
Payer: MEDICARE

## 2021-12-17 ENCOUNTER — HOSPITAL ENCOUNTER (OUTPATIENT)
Age: 71
Setting detail: OUTPATIENT SURGERY
Discharge: HOME | End: 2021-12-17
Attending: SURGERY | Admitting: SURGERY
Payer: MEDICARE

## 2021-12-17 VITALS
DIASTOLIC BLOOD PRESSURE: 45 MMHG | OXYGEN SATURATION: 96 % | RESPIRATION RATE: 16 BRPM | SYSTOLIC BLOOD PRESSURE: 123 MMHG

## 2021-12-17 VITALS
HEIGHT: 65 IN | SYSTOLIC BLOOD PRESSURE: 157 MMHG | WEIGHT: 212 LBS | RESPIRATION RATE: 16 BRPM | DIASTOLIC BLOOD PRESSURE: 65 MMHG | HEART RATE: 75 BPM | BODY MASS INDEX: 35.32 KG/M2 | OXYGEN SATURATION: 95 % | TEMPERATURE: 96.9 F

## 2021-12-17 PROBLEM — Z86.010 HISTORY OF COLON POLYPS: Status: ACTIVE | Noted: 2021-12-17

## 2021-12-17 PROBLEM — Z86.0100 HISTORY OF COLON POLYPS: Status: ACTIVE | Noted: 2021-12-17

## 2021-12-17 PROBLEM — E66.9 OBESITY (BMI 30-39.9): Chronic | Status: ACTIVE | Noted: 2021-12-17

## 2021-12-17 PROBLEM — Z12.11 SCREEN FOR COLON CANCER: Status: ACTIVE | Noted: 2021-12-17

## 2021-12-17 PROCEDURE — 6360000002 HC RX W HCPCS: Performed by: NURSE ANESTHETIST, CERTIFIED REGISTERED

## 2021-12-17 PROCEDURE — 7100000010 HC PHASE II RECOVERY - FIRST 15 MIN: Performed by: SURGERY

## 2021-12-17 PROCEDURE — 2500000003 HC RX 250 WO HCPCS: Performed by: NURSE ANESTHETIST, CERTIFIED REGISTERED

## 2021-12-17 PROCEDURE — 88305 TISSUE EXAM BY PATHOLOGIST: CPT

## 2021-12-17 PROCEDURE — 7100000011 HC PHASE II RECOVERY - ADDTL 15 MIN: Performed by: SURGERY

## 2021-12-17 PROCEDURE — 45380 COLONOSCOPY AND BIOPSY: CPT | Performed by: SURGERY

## 2021-12-17 PROCEDURE — 3700000000 HC ANESTHESIA ATTENDED CARE: Performed by: SURGERY

## 2021-12-17 PROCEDURE — 2709999900 HC NON-CHARGEABLE SUPPLY: Performed by: SURGERY

## 2021-12-17 PROCEDURE — 2580000003 HC RX 258: Performed by: NURSE ANESTHETIST, CERTIFIED REGISTERED

## 2021-12-17 PROCEDURE — 3700000001 HC ADD 15 MINUTES (ANESTHESIA): Performed by: SURGERY

## 2021-12-17 PROCEDURE — 3609010600 HC COLONOSCOPY POLYPECTOMY SNARE/COLD BIOPSY: Performed by: SURGERY

## 2021-12-17 RX ORDER — SODIUM CHLORIDE, SODIUM LACTATE, POTASSIUM CHLORIDE, CALCIUM CHLORIDE 600; 310; 30; 20 MG/100ML; MG/100ML; MG/100ML; MG/100ML
INJECTION, SOLUTION INTRAVENOUS CONTINUOUS
Status: DISCONTINUED | OUTPATIENT
Start: 2021-12-17 | End: 2021-12-17 | Stop reason: HOSPADM

## 2021-12-17 RX ORDER — PROPOFOL 10 MG/ML
INJECTION, EMULSION INTRAVENOUS PRN
Status: DISCONTINUED | OUTPATIENT
Start: 2021-12-17 | End: 2021-12-17 | Stop reason: SDUPTHER

## 2021-12-17 RX ORDER — LIDOCAINE HYDROCHLORIDE 20 MG/ML
INJECTION, SOLUTION EPIDURAL; INFILTRATION; INTRACAUDAL; PERINEURAL PRN
Status: DISCONTINUED | OUTPATIENT
Start: 2021-12-17 | End: 2021-12-17 | Stop reason: SDUPTHER

## 2021-12-17 RX ORDER — PROPOFOL 10 MG/ML
INJECTION, EMULSION INTRAVENOUS CONTINUOUS PRN
Status: DISCONTINUED | OUTPATIENT
Start: 2021-12-17 | End: 2021-12-17 | Stop reason: SDUPTHER

## 2021-12-17 RX ADMIN — SODIUM CHLORIDE, POTASSIUM CHLORIDE, SODIUM LACTATE AND CALCIUM CHLORIDE: 600; 310; 30; 20 INJECTION, SOLUTION INTRAVENOUS at 10:59

## 2021-12-17 RX ADMIN — PROPOFOL 20 MG: 10 INJECTION, EMULSION INTRAVENOUS at 11:50

## 2021-12-17 RX ADMIN — GLUCAGON HYDROCHLORIDE 1 MG: KIT at 11:41

## 2021-12-17 RX ADMIN — PROPOFOL 180 MCG/KG/MIN: 10 INJECTION, EMULSION INTRAVENOUS at 11:36

## 2021-12-17 RX ADMIN — LIDOCAINE HYDROCHLORIDE 80 MG: 20 INJECTION, SOLUTION EPIDURAL; INFILTRATION; INTRACAUDAL; PERINEURAL at 11:34

## 2021-12-17 RX ADMIN — PROPOFOL 100 MG: 10 INJECTION, EMULSION INTRAVENOUS at 11:34

## 2021-12-17 ASSESSMENT — PAIN - FUNCTIONAL ASSESSMENT: PAIN_FUNCTIONAL_ASSESSMENT: 0-10

## 2021-12-17 ASSESSMENT — PAIN SCALES - GENERAL
PAINLEVEL_OUTOF10: 0

## 2021-12-17 ASSESSMENT — LIFESTYLE VARIABLES: SMOKING_STATUS: 0

## 2021-12-17 NOTE — ANESTHESIA PRE PROCEDURE
Department of Anesthesiology  Preprocedure Note       Name:  Dylan Del Rio   Age:  80 y.o.  :  1950                                          MRN:  [id number]         Date:  2021      Surgeon: Radha Conroy):  Jessica Thurman DO    Procedure: Procedure(s):  CARPAL TUNNEL RELEASE  FINGER TRIGGER RELEASE-RIGHT MIDDLE    Medications prior to admission:   Prior to Admission medications    Medication Sig Start Date End Date Taking? Authorizing Provider   rosuvastatin (CRESTOR) 20 MG tablet Take 20 mg by mouth daily    Historical Provider, MD   modafinil (PROVIGIL) 200 MG tablet Take 300 mg by mouth daily.     Historical Provider, MD   lidocaine (XYLOCAINE) 5 % ointment Apply 1 inch topically daily  Patient not taking: Reported on 2021   Historical Provider, MD   sodium chloride (PEACE 128) 5 % ophthalmic solution Apply 2 drops to eye as needed    Historical Provider, MD FRAGOSO UF III MINI PEN NEEDLES 31G X 5 MM MISC Apply 1 Pen topically 3 times daily 17   Historical Provider, MD   buPROPion (WELLBUTRIN) 100 MG tablet Take 200 mg by mouth 2 times daily     Historical Provider, MD   albuterol (PROVENTIL) (2.5 MG/3ML) 0.083% nebulizer solution Take 2.5 mg by nebulization every 6 hours as needed for Wheezing    Historical Provider, MD   aspirin 81 MG tablet Take 81 mg by mouth daily    Historical Provider, MD   cyclobenzaprine (FLEXERIL) 10 MG tablet Take 20 mg by mouth 3 times daily as needed for Muscle spasms    Historical Provider, MD   famotidine (PEPCID) 20 MG tablet Take 20 mg by mouth 2 times daily     Historical Provider, MD   gabapentin (NEURONTIN) 600 MG tablet Take 1,200 mg by mouth 2 times daily     Historical Provider, MD   triamterene-hydrochlorothiazide (MAXZIDE-25) 37.5-25 MG per tablet Take 2 tablets by mouth daily    Historical Provider, MD   insulin glargine (LANTUS) 100 UNIT/ML injection vial Inject 78 Units into the skin nightly     Historical Provider, MD   levothyroxine (SYNTHROID) 75 MCG tablet Take 75 mcg by mouth Daily Takes 2 tablets on     Historical Provider, MD   metFORMIN (GLUCOPHAGE) 1000 MG tablet Take 1,000 mg by mouth 2 times daily (with meals)    Historical Provider, MD   metoprolol (LOPRESSOR) 50 MG tablet Take 125 mg by mouth nightly     Historical Provider, MD   lisinopril (PRINIVIL;ZESTRIL) 10 MG tablet Take 10 mg by mouth daily    Historical Provider, MD       Current medications:    No current facility-administered medications for this visit. No current outpatient medications on file. Facility-Administered Medications Ordered in Other Visits   Medication Dose Route Frequency Provider Last Rate Last Admin    lactated ringers infusion   IntraVENous Continuous Tash Vaughan, APRN - CRNA           Allergies:  No Known Allergies    Problem List:    Patient Active Problem List   Diagnosis Code    Left carpal tunnel syndrome G56.02    Diverticulitis K57.92    Colonic diverticular abscess K57.20    Type 2 diabetes mellitus with complication, with long-term current use of insulin (HCC) E11.8, Z79.4    Essential hypertension I10       Past Medical History:        Diagnosis Date    Chronic kidney disease     Cystic fibrosis ([facility]     Diabetes mellitus ([facility]     Family history of colon cancer     Fibromyalgia     History of colon polyps     History of colonic diverticulitis     Hyperlipidemia     Hypertension     Obesity (BMI 30-39. 9)     Thyroid disease        Past Surgical History:        Procedure Laterality Date    BREAST LUMPECTOMY Left      SECTION      CHOLECYSTECTOMY      COLONOSCOPY      COLONOSCOPY      César. tics.     HYSTERECTOMY      MS INCISE FINGER TENDON SHEATH Right 2018    FINGER TRIGGER RELEASE-RIGHT MIDDLE performed by Ciara Abraham MD at [address]/CARPAL TUNNEL SURG Right 2018    CARPAL TUNNEL RELEASE performed by Ciara Abraham MD at [address] [facility] History: Social History     Tobacco Use    Smoking status: Former Smoker     Quit date: 2000     Years since quittin.9    Smokeless tobacco: Never Used   Substance Use Topics    Alcohol use: No                                Counseling given: Not Answered      Vital Signs (Current): There were no vitals filed for this visit. BP Readings from Last 3 Encounters:   21 (!) 155/68   21 124/74   10/20/21 121/70       NPO Status:                                                                                 BMI:   Wt Readings from Last 3 Encounters:   21 212 lb (96.2 kg)   21 209 lb 6.4 oz (95 kg)   10/20/21 214 lb 9.6 oz (97.3 kg)     There is no height or weight on file to calculate BMI.    CBC:   Lab Results   Component Value Date    WBC 9.3 10/20/2021    RBC 3.95 10/20/2021    HGB 11.8 10/20/2021    HCT 36.6 10/20/2021    MCV 92.7 10/20/2021    RDW 13.2 10/20/2021     10/20/2021       CMP:   Lab Results   Component Value Date     10/20/2021    K 3.8 10/20/2021     10/20/2021    CO2 21 10/20/2021    BUN 9 10/20/2021    CREATININE 0.96 10/20/2021    GFRAA >60 10/20/2021    LABGLOM 57 10/20/2021    GLUCOSE 125 10/20/2021    PROT 7.1 10/16/2021    CALCIUM 8.8 10/20/2021    BILITOT 0.34 10/16/2021    ALKPHOS 87 10/16/2021    AST 20 10/16/2021    ALT 26 10/16/2021       POC Tests: No results for input(s): POCGLU, POCNA, POCK, POCCL, POCBUN, POCHEMO, POCHCT in the last 72 hours.     Coags: No results found for: PROTIME, INR, APTT    HCG (If Applicable): No results found for: PREGTESTUR, PREGSERUM, HCG, HCGQUANT     ABGs: No results found for: PHART, PO2ART, MWK8ECL, JOY7FBP, BEART, C5DCPABB     Type & Screen (If Applicable):  No results found for: LABABO, [facility]    Anesthesia Evaluation  Patient summary reviewed and Nursing notes reviewed no history of anesthetic complications:   Airway: Mallampati: III  TM distance: >3 FB   Neck ROM: full  Mouth opening: > = 3 FB Dental:    (+) caps and partials  Comment: Front upper teeth all veneers. 3 Permanent bridges. Pulmonary:normal exam    (+) sleep apnea: on CPAP,      (-) not a current smoker                          ROS comment: Wears Home O2, cystic lung disease per patient. Cardiovascular:  Exercise tolerance: poor (<4 METS),   (+) hypertension:, angina:, hyperlipidemia                  Neuro/Psych:   Negative Neuro/Psych ROS              GI/Hepatic/Renal:   (+) GERD: well controlled, renal disease:, bowel prep,          ROS comment: Cysts on kidneys (bilat)    . Endo/Other:    (+) DiabetesType II DM, well controlled, , hypothyroidism: arthritis: OA., .                  ROS comment: A1C per patient = 6 Abdominal:   (+) obese,           Vascular: negative vascular ROS. Other Findings:             Anesthesia Plan      general and TIVA     ASA 3       Induction: intravenous. Anesthetic plan and risks discussed with patient. Use of blood products discussed with patient whom consented to blood products. Plan discussed with CRNA.                   BEHZAD Guevara - CRNA   12/17/2021

## 2021-12-17 NOTE — PROGRESS NOTES
Patient dressed and waiting on son, Jose Guadalupe Wilkes, to come pick her up and go over discharge instructions.

## 2021-12-17 NOTE — ANESTHESIA POSTPROCEDURE EVALUATION
Department of Anesthesiology  Postprocedure Note    Patient: Hugh Kang  MRN: [id number]  YOB: 1950  Date of evaluation: 12/17/2021  Time:  12:45 PM     Procedure Summary     Date: 12/17/21 Room / Location: [address] [place] [address]    Anesthesia Start: 1130 Anesthesia Stop: 7664    Procedure: COLONOSCOPY POLYPECTOMY SNARE/COLD BIOPSY (N/A ) Diagnosis: (screening)    Surgeons: Lilian Rea DO Responsible Provider: BEHZAD Huitron CRNA    Anesthesia Type: general, TIVA ASA Status: 3          Anesthesia Type: general, TIVA    Yadira Phase I:      Yadira Phase II: Yadira Score: 10    Last vitals: Reviewed and per EMR flowsheets.        Anesthesia Post Evaluation    Patient location during evaluation: PACU  Patient participation: complete - patient participated  Level of consciousness: awake and alert  Pain score: 0  Airway patency: patent  Nausea & Vomiting: no nausea and no vomiting  Complications: no  Cardiovascular status: blood pressure returned to baseline  Respiratory status: acceptable and room air  Hydration status: stable

## 2021-12-17 NOTE — PROGRESS NOTES

## 2021-12-17 NOTE — OP NOTE
Operative Note      Patient: Hugh Kang  YOB: 1950  MRN: [id number]   Sravanthi Juancarlos- PCP      Date of Procedure: 12/17/2021    Pre-Op Diagnosis: screening colonoscopy    Post-Op Diagnosis: Sigmoid moderate diverticulosis. Transverse colon polyps x 2. redundant colon, large chronic grade 3-4 internal hemorrhoids without bleeding or inflammation. Family history colon cancer and colon polyps. personal history large polyps. Non-bleeding cecal AVM. Procedure:    Colonoscopy to cecum with cold forceps polypectomy x 2      Surgeon(s):  Lilian Rea DO    Anesthesia: Monitor Anesthesia Care    Estimated Blood Loss (mL): none    Complications: None    Specimens:   ID Type Source Tests Collected by Time Destination   A : transverse colon polyp Tissue Colon-Transverse SURGICAL PATHOLOGY Lilian Rea DO 12/17/2021 1149      Procedure details:    I do meet with the patient in preoperative holding area. Please see H&P for indications for the above named procedure. Patient was brought to the endoscopy suite and placed under monitored anesthesia. Patient was positioned in left lateral position. Time out called and procedure confirmed. The lubricated variable stiffness pediatric colonoscope was carefully passed under direct vision into the rectum, advanced through the sigmoid colon, transverse colon, ascending colon and the cecum. The ileocecal valve was well visualized. Additional findings:    Findings:     Cecum: normal cecal pouch. IC valve and appendiceal orifice well confirmed. There is a non-bleeding AVM noted  Ascending: normal  Transverse: 2 sessile polyps removed via cold forceps polypectomy method and submitted  Descending: redundant  Sigmoid: moderate diverticulosis. No inflammation. Spasm was noted and was relaxed with IV glucagon administration during procedure.   Rectum: normal  Rectal exam: large chronic internal hemorrhoids without bleeding, thrombosis, or inflammation, no masses  Bowel prep quality: excellent    The colon is redundant. The mucosa is healthy appearing. At the distal 1/3 of the rectum, the scope was retroflexed, see above. The patient tolerated the procedure well. The abdomen was soft after the procedure. I spoke with pt/family afterwards. Next colonoscopy 3 years.       Electronically signed by Rosemarie Harris DO, FACOS, FACS on 12/18/2021 at 1:56 PM

## 2021-12-17 NOTE — H&P
GENERAL SURGERY H&P        Patient's Name/ Date of Birth/ Gender: Aman Strange / 1950 (80 y.o.) / female      PCP: Leana Macias  Referring: Hospitalist team Yasimne Enciso     History of present Illness:  Patient is a pleasant 80 y.o. female  Admitted to [address] [place] [address] through ER for abdominal pain. She also had diarrhea, C. Diff negative. Stable. She is up sitting in chair when seen and examined. She provides good history. She has had regular colonoscopies since at least age 49, last one in [place] 2017. She reports large family history of colon cancer. She has a personal history of a large polyp removed in piecemeal fashion at age 49 with subsequent repeat colonoscopy 30 days later to removed the remainder of it successfully. She looks a lot younger than 80. She had an abscess contained on CT this admission and is responding well to IV antibiotics and has since been advanced to liquid diet, WBC now normal. She has known history of sigmoid diverticulitis but this is the first hospital admission for it. Her last 4 CT scans abdomen/pelvis on Epic are copied below and reviewed. She denies melena or hematochezia. Denies nausea or vomiting no fevers or chills this admission. 11/24/21 OV     Neri Cedillo is clinically doing better. Tolerating diet, better bowel movements. Advised to proceed with colonoscopy after CT findings on hospital admission, see below. Vitals stable, abdomen soft, no peritonitis or acute issues. Strong family history of colon issues including colon cancer and polyps, she herself had history of large polyps. Denies currently any hematochezia or melena. No obstructive symptoms. Risks and benefits of colonoscopy discussed, scheduled.    .        Reference note: 10/20/2021     Past Medical History:  has a past medical history of Chronic kidney disease, Cystic fibrosis ([facility] Diabetes mellitus ([facility] Family history of colon cancer, Fibromyalgia, History of colon polyps, History of colonic diverticulitis, Hyperlipidemia, Hypertension, Obesity (BMI 30-39.9), and Thyroid disease.     Past Surgical History:   Past Surgical History         Past Surgical History:   Procedure Laterality Date    BREAST LUMPECTOMY Left       SECTION        CHOLECYSTECTOMY        COLONOSCOPY   2013    COLONOSCOPY   2017     [employer]. tics.  HYSTERECTOMY        MI INCISE FINGER TENDON SHEATH Right 2018     FINGER TRIGGER RELEASE-RIGHT MIDDLE performed by Antoine Ocasio MD at [address]/CARPAL TUNNEL SURG Right 2018     CARPAL TUNNEL RELEASE performed by Antoine Ocasio MD at [address] [address] History:  reports that she quit smoking about 21 years ago.  She has never used smokeless tobacco. She reports that she does not drink alcohol and does not use drugs.     Family History: family history is not on file.     Review of Systems:   General: Completed and, except as mentioned above, was negative or noncontributory  Psychological:  Completed and, except as mentioned above, was negative or noncontributory  Ophthalmic:  Completed and, except as mentioned above, was negative or noncontributory  ENT:  Completed and, except as mentioned above, was negative or noncontributory  Allergy and Immunology:  Completed and, except as mentioned above, was negative or noncontributory  Hematological and Lymphatic:  Completed and, except as mentioned above, was negative or noncontributory  Endocrine: Completed and, except as mentioned above, was negative or noncontributory  Breast:  Completed and, except as mentioned above, was negative or noncontributory  Respiratory:  Completed and, except as mentioned above, was negative or noncontributory  Cardiovascular:  Completed and, except as mentioned above, was negative or noncontributory  Gastrointestinal: Completed and, except as mentioned above, was negative or noncontributory  Genito-Urinary:  Completed and, except as mentioned above, was negative or noncontributory  Musculoskeletal:  Completed and, except as mentioned above, was negative or noncontributory  Neurological:  Completed and, except as mentioned above, was negative or noncontributory  Dermatological:  Completed and, except as mentioned above, was negative or noncontributory     Allergies: Patient has no known allergies.     Current Meds:  Current Medication     Current Outpatient Medications:     rosuvastatin (CRESTOR) 20 MG tablet, Take 20 mg by mouth daily, Disp: , Rfl:     modafinil (PROVIGIL) 200 MG tablet, Take 300 mg by mouth daily. , Disp: , Rfl:     lidocaine (XYLOCAINE) 5 % ointment, Apply 1 inch topically daily, Disp: , Rfl:     sodium chloride (PEACE 128) 5 % ophthalmic solution, Apply 2 drops to eye as needed, Disp: , Rfl:     B-D UF III MINI PEN NEEDLES 31G X 5 MM MISC, Apply 1 Pen topically 3 times daily, Disp: , Rfl:     buPROPion (WELLBUTRIN) 100 MG tablet, Take 200 mg by mouth 2 times daily , Disp: , Rfl:     albuterol (PROVENTIL) (2.5 MG/3ML) 0.083% nebulizer solution, Take 2.5 mg by nebulization every 6 hours as needed for Wheezing, Disp: , Rfl:     aspirin 81 MG tablet, Take 81 mg by mouth daily, Disp: , Rfl:     cyclobenzaprine (FLEXERIL) 10 MG tablet, Take 20 mg by mouth 3 times daily as needed for Muscle spasms, Disp: , Rfl:     famotidine (PEPCID) 20 MG tablet, Take 20 mg by mouth 2 times daily, Disp: , Rfl:     gabapentin (NEURONTIN) 600 MG tablet, Take 1,200 mg by mouth 2 times daily , Disp: , Rfl:     triamterene-hydrochlorothiazide (MAXZIDE-25) 37.5-25 MG per tablet, Take 2 tablets by mouth daily, Disp: , Rfl:     insulin glargine (LANTUS) 100 UNIT/ML injection vial, Inject 78 Units into the skin nightly , Disp: , Rfl:     levothyroxine (SYNTHROID) 75 MCG tablet, Take 75 mcg by mouth Daily Takes 2 tablets on sunday, Disp: , Rfl:     metFORMIN (GLUCOPHAGE) 1000 MG tablet, Take 1,000 mg by mouth 2 times daily (with meals), Disp: , Rfl:    metoprolol (LOPRESSOR) 50 MG tablet, Take 125 mg by mouth nightly , Disp: , Rfl:     lisinopril (PRINIVIL;ZESTRIL) 10 MG tablet, Take 10 mg by mouth daily, Disp: , Rfl:         Physical Exam:  Vital signs and Nurse's note reviewed. Wt 209 lb 6.4 oz (95 kg)   BMI 34.85 kg/m²    weight is 209 lb 6.4 oz (95 kg). Gen:  A&Ox3, NAD. Pleasant and cooperative. HEENT: PERRLA, EOMI, no scleral icterus  Neck:  no goiter  CVS: Regular rate and rhythm  Resp: Good bilateral air entry, no active wheezing, no labored breathing  Abd: soft, localized LLQ tenderness without peritonitis, rectal exam to be done at scope  Ext: Moves all extremities, no gross focal motor deficits  Skin: No erythema or ulcerations      Labs:         Lab Results   Component Value Date     WBC 9.3 10/20/2021     HGB 11.8 10/20/2021     HCT 36.6 10/20/2021     MCV 92.7 10/20/2021      10/20/2021            Lab Results   Component Value Date      10/20/2021     K 3.8 10/20/2021      10/20/2021     CO2 21 10/20/2021     BUN 9 10/20/2021     CREATININE 0.96 10/20/2021     GLUCOSE 125 10/20/2021     CALCIUM 8.8 10/20/2021            Lab Results   Component Value Date     ALKPHOS 87 10/16/2021     ALT 26 10/16/2021     AST 20 10/16/2021     PROT 7.1 10/16/2021     BILITOT 0.34 10/16/2021     LABALBU 3.8 10/16/2021            Lab Results   Component Value Date     AMYLASE 35 10/16/2021            Lab Results   Component Value Date     LIPASE 24 10/16/2021      No results found for: INR     Radiologic Studies:  EXAMINATION:   CT OF THE ABDOMEN AND PELVIS WITH CONTRAST 10/16/2021 12:10 pm       TECHNIQUE:   CT of the abdomen and pelvis was performed with the administration of   intravenous contrast. Multiplanar reformatted images are provided for review.    Dose modulation, iterative reconstruction, and/or weight based adjustment of   the mA/kV was utilized to reduce the radiation dose to as low as reasonably   achievable.       COMPARISON: May 16 20       HISTORY:   ORDERING SYSTEM PROVIDED HISTORY: pain   TECHNOLOGIST PROVIDED HISTORY:       pain   Decision Support Exception - unselect if not a suspected or confirmed   emergency medical condition->Emergency Medical Condition (MA)       FINDINGS:   Lower Chest: There is a pulmonary nodule medial right lung base measuring 9   mm unchanged felt to be benign.  Linear opacity seen anterior posterior right   lung base likely scarring.       Organs: There is a tiny low-attenuation lesion centrally in the liver too   small to characterize similar to prior exam likely cyst.  Liver and spleen   are enhancing normally.  The pancreas and adrenal glands within normal   limits.  Patient status post cholecystectomy. Ashly Gosselin are several mixed   attenuation nodules involving both kidneys felt represent simple and   complicated/hemorrhagic cysts.       GI/Bowel: There are no dilated loops of bowel.  There is moderate wall   thickening involving the sigmoid colon.  There are multiple tiny diverticuli   involving the colon noted. Ashly Gosselin is a focal fluid collection intimately   associated with the wall of the affected colon measuring 32 x 18 x 39 mm. Moderate surrounding inflammatory stranding noted.       Pelvis: There is no free fluid.  Urinary bladder is collapsed.  Affected   bowel and small fluid collection is closely associated with the   posterosuperior margin of the bladder.  There is bladder wall of thickening.       Peritoneum/Retroperitoneum: Moderate atherosclerotic plaque infrarenal   abdominal aorta.       Bones/Soft Tissues:  There is a large lipoma involving right gluteus jp   generally measuring 9.5 cm unchanged.    CT 10/2021       Impression   There is moderate wall thickening with surrounding inflammatory change   involving mid sigmoid colon suggesting focal colitis.  Several associated   diverticuli noted in which I favor acute diverticulitis. Ashly Gosselin is a fluid   collection intimately associated with the affected bowel likely small abscess   measuring 3.2 x 1.8 x 3.9 cm.       Several mixed attenuation nodules both kidneys felt represent simple and   complex cyst.       Pulmonary nodule right lung base is unchanged felt to be benign. Honora Glad is   also linear opacity right lower lung zone similar to prior exam likely   chronic scarring.       Large fatty mass right gluteus pj felt represent lipoma unchanged         EXAMINATION:   CT OF THE ABDOMEN AND PELVIS WITH CONTRAST 5/16/2020 1:34 am       TECHNIQUE:   CT of the abdomen and pelvis was performed with the administration of   intravenous contrast. Multiplanar reformatted images are provided for review. Dose modulation, iterative reconstruction, and/or weight based adjustment of   the mA/kV was utilized to reduce the radiation dose to as low as reasonably   achievable.       COMPARISON:   06/02/2018       HISTORY:   ORDERING SYSTEM PROVIDED HISTORY: [facility] abdominal pain   TECHNOLOGIST PROVIDED HISTORY:       [facility] abdominal pain       FINDINGS:   Lower Chest: No focal consolidation.  Cystic changes.       Organs: Cholecystectomy.  The visualized portions of the liver, spleen,   pancreas and adrenal glands demonstrate no acute abnormality.  No biliary   ductal dilatation.  The kidneys appear normal in size and demonstrate   symmetric enhancement.  No focal renal mass.  No hydronephrosis. No   perinephric stranding.       GI/Bowel: No bowel dilatation to suggest obstruction.  No evidence of acute   appendicitis.  Diverticulosis of the sigmoid with perhaps slight stranding in   the adjacent mesentery.       Pelvis:  The urinary bladder appears unremarkable.  The pelvic organs   demonstrate no acute abnormality.       Peritoneum/Retroperitoneum: The abdominal aorta is normal in caliber.  No   gross lymphadenopathy, fluid collection, or free air.       Bones/Soft Tissues: No acute findings.           Impression   Perhaps early mild acute diverticulitis.    CT 5/2020      FINAL REPORT       PROCEDURE:  CT abdomen and pelvis without contrast.        TECHNIQUE:  Computerized axial tomography of the abdomen and pelvis was performed without intravenous contrast. This study is performed without intravascular contrast material and its sensitivity for abdominal and pelvic pathology, including neoplasms,    inflammation, abscess, free fluid, thrombosis, arterial dissection and infarction, is reduced compared with a contrast enhanced study.       HISTORY:  Bilateral flank pain.        COMPARISON:  CT abdomen and pelvis 12/12/2016.       FINDINGS:     There is a small nodule in the medial portion of the right lower lobe. This is unchanged from the previous study and is consistent with a benign lesion. There is a linear scar in the right lower lobe. There are some small cysts in the left lower lobe. There are no pleural effusions. The heart size is normal. The liver, pancreas and spleen are grossly normal. Cholecystectomy clips are present. There is no biliary dilatation. The adrenal glands are not enlarged. Both kidneys appear normal in size. There    are small cortical masses in both kidneys. These appear similar to the previous study and are consistent with renal cysts. The abdominal aorta has a normal caliber. There is no retroperitoneal adenopathy. The unopacified gastrointestinal tract is    unremarkable. There are a few diverticula in the descending colon and sigmoid colon. There are no signs of acute diverticulitis. A normal appendix is visible. The bladder is unremarkable. The uterus has been removed. The regional skeleton appears intact.               Impression   Impression:     Benign pulmonary nodule in the right lower lobe. Bilateral renal cysts. Previous cholecystectomy and hysterectomy. Mild colonic diverticulosis.  No evidence of acute disease in the abdomen or pelvis.           Electronically Signed By: Hilary Briscoe   on  06/02/2018  18:26      FINAL REPORT       EXAM:  CT ABDOMEN PELVIS W IV CONTRAST       HISTORY:  Abdominal pain        TECHNIQUE:  Spiral CT scanning of the abdomen and pelvis after the uneventful administration of IV contrast. Multiplanar reformations. 100 mL Optiray IV.        PRIORS:  None.       FINDINGS:     Abdomen: Visualized lung bases show mild emphysematous and fibrotic change or scarring bilaterally. Partially calcified, juxtapleural nodular density in the posteromedial aspect of right lower lobe measuring 1.3 cm. Noncalcified, nodular density also    noted in the right lower lobe medial aspect and azygoesophageal recess measuring 7 mm.        Liver enlarged and shows mildly decreased attenuation without focal abnormality. Multiple, round hypodense and some isodense foci in the kidneys, largest on the right measuring 1.8 cm. No significant hydronephrosis. Spleen, pancreas and adrenal glands    are without significant abnormality. Gallbladder surgically absent.        Pelvis: Diverticular changes noted in the sigmoid colon, with focal segment of bowel wall and fold thickening and pericolonic fat stranding. Liquefaction or fluid attenuation also noted along the antimesenteric portion of involved sigmoid colon wall    measuring 2.5 x 7 cm.        Remainder of bowel grossly unremarkable. Appendix within normal limits. No significant free peritoneal fluid or apparent adenopathy. Abdominal aorta is non-aneurysmal.        Impression:     1. Findings compatible with sigmoid colon diverticulitis, including liquefaction along involved segment of sigmoid colon wall, which may represent intramural inflammatory collection, abscess or possible necrosis. Clinical correlation and followup    suggested.        2. Findings compatible with hepatomegaly and mild fatty infiltration in the liver.        3. Bilateral renal hypodensities and isodensities possibly representing cysts, but nonspecific.  Correlation with renal ultrasound may be confirmatory, as clinically indicated.        4. Nodular density in right lower lobe, indeterminate. If the patient is low risk (no significant smoking history, no history of malignancy, and a normal immune system), nodules 6-8 mm in size need noncontrast CT chest follow-up in 6-12 months.  If there    is no change at that time, an additional follow-up noncontrast chest CT recommended at 18 to 24 months from today's date.  If the patient is high risk, follow-up noncontrast chest CT in 3-6 months recommended.  If there is no change at that time, an    additional noncontrast chest CT recommended at 18-24 months from today's date. (Based on Fleischner criteria).         Electronically Signed By: Kenyon Dietrich   on  12/12/2016  23:00            Impressions/Recommendations:      History of recurrent diverticulitis. First admission for it with localized abscess, responding well to IV antibiotics. History as above. Recommended outpatient colonoscopy 6-8 weeks after discharge. I told her I am happy to do it in [place] or she may followup with her previous endoscopist, last scope in [place]. Discussed plan with primary team and RN. I told her I come to [place] a few days a week from [place] and could do it here if she would like.      MANJEET Love, MPH, [address] [place] [address] office [phone]  [place] office [phone]     Due for screening colonoscopy    Risks and benefits of colonoscopy have been discussed in detail with the patient. Risks of the procedure include bleeding, bowel perforation, possibly missing smaller lesions if the bowel prep is not adequate. Alternative studies include barium enema and virtual colonoscopy; however, if a lesion is seen, then one would still need to proceed with the gold standard colonoscopy to retrieve or biopsy the lesion. All the patient's questions are answered. Will proceed with colonoscopy under [facility].        H&P  General Surgery        Pt Name: Shivani Galicia  MRN: [id number]  [place]: 1950  Date of evaluation: 12/17/2021      [x] I have examined the patient and reviewed the H&P/Consult completed, and there are no changes to the patient or plans. [] I have examined the patient and reviewed the H&P/Consult and have noted the following changes: The patient was counseled at length about the risks of cory Covid-19 during their perioperative period and any recovery window from their procedure. The patient was made aware that cory Covid-19  may worsen their prognosis for recovering from their procedure  and lend to a higher morbidity and/or mortality risk. All material risks, benefits, and reasonable alternatives including postponing the procedure were discussed. The patient does wish to proceed with the procedure at this time.         Electronically signed by Nella Dunham DO  on 12/17/2021 at 11:18 AM

## 2021-12-17 NOTE — BRIEF OP NOTE
Brief Postoperative Note      Patient: Mayra Mcallister  YOB: 1950  MRN: [id number]   Glynis Bernheim- PCP      Date of Procedure: 12/17/2021    Pre-Op Diagnosis: screening colonoscopy    Post-Op Diagnosis: Sigmoid moderate diverticulosis. Transverse colon polyps x 2. redundant colon, large chronic grade 3-4 internal hemorrhoids without bleeding or inflammation. Family history colon cancer and colon polyps. personal history large polyps. Non-bleeding cecal AVM.        Procedure:    Colonoscopy to cecum with cold forceps polypectomy x 2      Surgeon(s):  Slick Casas DO    Anesthesia: Monitor Anesthesia Care    Estimated Blood Loss (mL): none    Complications: None    Specimens:   ID Type Source Tests Collected by Time Destination   A : transverse colon polyp Tissue Colon-Transverse SURGICAL PATHOLOGY Slick Casas DO 12/17/2021 1149        Electronically signed by Slick aCsas DO, FACOS, FACS on 12/18/2021 at 1:56 PM

## 2021-12-21 LAB — SURGICAL PATHOLOGY REPORT: NORMAL

## 2022-01-16 PROBLEM — Z12.11 SCREEN FOR COLON CANCER: Status: RESOLVED | Noted: 2021-12-17 | Resolved: 2022-01-16

## 2022-07-11 NOTE — PROGRESS NOTES
Noted My best wishes to both of them Patient admitted to [facility] from ER with diverticulitis. Patient states she has a history of it. Patient alert and oriented. Has some abdominal pain and cramping.

## 2023-03-09 ENCOUNTER — HOSPITAL ENCOUNTER (OUTPATIENT)
Dept: ULTRASOUND IMAGING | Age: 73
Discharge: HOME | End: 2023-03-11
Payer: MEDICARE

## 2023-03-09 ENCOUNTER — HOSPITAL ENCOUNTER (OUTPATIENT)
Dept: WOMENS IMAGING | Age: 73
Discharge: HOME | End: 2023-03-11
Payer: MEDICARE

## 2023-03-09 DIAGNOSIS — Z12.31 BREAST CANCER SCREENING BY MAMMOGRAM: ICD-10-CM

## 2023-03-09 DIAGNOSIS — E04.2 MULTIPLE THYROID NODULES: ICD-10-CM

## 2023-03-09 PROCEDURE — 76536 US EXAM OF HEAD AND NECK: CPT

## 2023-03-09 PROCEDURE — 77067 SCR MAMMO BI INCL CAD: CPT

## 2023-09-04 ENCOUNTER — HOSPITAL ENCOUNTER (EMERGENCY)
Age: 73
Discharge: HOME | End: 2023-09-04
Attending: EMERGENCY MEDICINE
Payer: MEDICARE

## 2023-09-04 ENCOUNTER — APPOINTMENT (OUTPATIENT)
Dept: CT IMAGING | Age: 73
End: 2023-09-04
Payer: MEDICARE

## 2023-09-04 VITALS
HEART RATE: 68 BPM | BODY MASS INDEX: 35.82 KG/M2 | DIASTOLIC BLOOD PRESSURE: 53 MMHG | RESPIRATION RATE: 20 BRPM | SYSTOLIC BLOOD PRESSURE: 104 MMHG | HEIGHT: 65 IN | TEMPERATURE: 98.2 F | WEIGHT: 215 LBS | OXYGEN SATURATION: 95 %

## 2023-09-04 DIAGNOSIS — S22.41XA CLOSED FRACTURE OF MULTIPLE RIBS OF RIGHT SIDE, INITIAL ENCOUNTER: ICD-10-CM

## 2023-09-04 DIAGNOSIS — S30.1XXA CONTUSION OF ABDOMINAL WALL, INITIAL ENCOUNTER: ICD-10-CM

## 2023-09-04 DIAGNOSIS — S20.219A CONTUSION OF CHEST WALL, UNSPECIFIED LATERALITY, INITIAL ENCOUNTER: Primary | ICD-10-CM

## 2023-09-04 LAB
ALBUMIN SERPL-MCNC: 3.7 G/DL (ref 3.5–5.2)
ALP SERPL-CCNC: 66 U/L (ref 35–104)
ALT SERPL-CCNC: 18 U/L (ref 5–33)
ANION GAP SERPL CALCULATED.3IONS-SCNC: 8 MMOL/L (ref 9–17)
AST SERPL-CCNC: 25 U/L
BASOPHILS # BLD: ABNORMAL K/UL (ref 0–0.2)
BASOPHILS NFR BLD: ABNORMAL % (ref 0–2)
BILIRUB SERPL-MCNC: 0.3 MG/DL (ref 0.3–1.2)
BUN SERPL-MCNC: 23 MG/DL (ref 8–23)
BUN/CREAT SERPL: 18 (ref 9–20)
CALCIUM SERPL-MCNC: 9.5 MG/DL (ref 8.6–10.4)
CHLORIDE SERPL-SCNC: 103 MMOL/L (ref 98–107)
CO2 SERPL-SCNC: 29 MMOL/L (ref 20–31)
CREAT SERPL-MCNC: 1.3 MG/DL (ref 0.5–0.9)
EOSINOPHIL # BLD: 0.24 K/UL (ref 0–0.4)
EOSINOPHILS RELATIVE PERCENT: 3 % (ref 0–5)
ERYTHROCYTE [DISTWIDTH] IN BLOOD BY AUTOMATED COUNT: 14.5 % (ref 12.1–15.2)
GFR SERPL CREATININE-BSD FRML MDRD: 44 ML/MIN/1.73M2
GLUCOSE SERPL-MCNC: 75 MG/DL (ref 70–99)
HCT VFR BLD AUTO: 36.5 % (ref 36–46)
HGB BLD-MCNC: 11.9 G/DL (ref 12–16)
IMM GRANULOCYTES # BLD AUTO: ABNORMAL K/UL (ref 0–0.3)
IMM GRANULOCYTES NFR BLD: ABNORMAL %
LYMPHOCYTES NFR BLD: 2.13 K/UL (ref 1–4.8)
LYMPHOCYTES RELATIVE PERCENT: 27 % (ref 15–40)
MCH RBC QN AUTO: 30.1 PG (ref 26–34)
MCHC RBC AUTO-ENTMCNC: 32.7 G/DL (ref 31–37)
MCV RBC AUTO: 92.1 FL (ref 80–100)
MONOCYTES NFR BLD: 0.87 K/UL (ref 0–1)
MONOCYTES NFR BLD: 11 % (ref 4–8)
MORPHOLOGY: ABNORMAL
NEUTROPHILS NFR BLD: 59 % (ref 47–75)
NEUTS SEG NFR BLD: 4.66 K/UL (ref 2.5–7)
PLATELET # BLD AUTO: 167 K/UL (ref 140–450)
POTASSIUM SERPL-SCNC: 5 MMOL/L (ref 3.7–5.3)
PROT SERPL-MCNC: 6.5 G/DL (ref 6.4–8.3)
RBC # BLD AUTO: 3.97 M/UL (ref 4–5.2)
SODIUM SERPL-SCNC: 140 MMOL/L (ref 135–144)
WBC OTHER # BLD: 7.9 K/UL (ref 3.5–11)

## 2023-09-04 PROCEDURE — 96376 TX/PRO/DX INJ SAME DRUG ADON: CPT

## 2023-09-04 PROCEDURE — 85025 COMPLETE CBC W/AUTO DIFF WBC: CPT

## 2023-09-04 PROCEDURE — 96374 THER/PROPH/DIAG INJ IV PUSH: CPT

## 2023-09-04 PROCEDURE — 80053 COMPREHEN METABOLIC PANEL: CPT

## 2023-09-04 PROCEDURE — 6360000002 HC RX W HCPCS: Performed by: EMERGENCY MEDICINE

## 2023-09-04 PROCEDURE — 6360000004 HC RX CONTRAST MEDICATION: Performed by: EMERGENCY MEDICINE

## 2023-09-04 PROCEDURE — 36415 COLL VENOUS BLD VENIPUNCTURE: CPT

## 2023-09-04 PROCEDURE — 71260 CT THORAX DX C+: CPT

## 2023-09-04 PROCEDURE — 99285 EMERGENCY DEPT VISIT HI MDM: CPT

## 2023-09-04 RX ORDER — MORPHINE SULFATE 4 MG/ML
4 INJECTION, SOLUTION INTRAMUSCULAR; INTRAVENOUS ONCE
Status: COMPLETED | OUTPATIENT
Start: 2023-09-04 | End: 2023-09-04

## 2023-09-04 RX ORDER — ESCITALOPRAM OXALATE 10 MG/1
10 TABLET ORAL EVERY 24 HOURS
COMMUNITY
Start: 2023-04-25

## 2023-09-04 RX ORDER — OMEPRAZOLE 20 MG/1
20 CAPSULE, DELAYED RELEASE ORAL EVERY 24 HOURS
COMMUNITY
Start: 2021-10-15

## 2023-09-04 RX ORDER — CHLORAL HYDRATE 500 MG
1 CAPSULE ORAL DAILY
COMMUNITY

## 2023-09-04 RX ADMIN — IOPAMIDOL 100 ML: 755 INJECTION, SOLUTION INTRAVENOUS at 19:17

## 2023-09-04 RX ADMIN — MORPHINE SULFATE 4 MG: 4 INJECTION, SOLUTION INTRAMUSCULAR; INTRAVENOUS at 18:13

## 2023-09-04 RX ADMIN — MORPHINE SULFATE 4 MG: 4 INJECTION, SOLUTION INTRAMUSCULAR; INTRAVENOUS at 20:16

## 2023-09-04 ASSESSMENT — LIFESTYLE VARIABLES
HOW MANY STANDARD DRINKS CONTAINING ALCOHOL DO YOU HAVE ON A TYPICAL DAY: PATIENT DOES NOT DRINK
HOW OFTEN DO YOU HAVE A DRINK CONTAINING ALCOHOL: NEVER

## 2023-09-04 ASSESSMENT — PAIN DESCRIPTION - LOCATION
LOCATION: ABDOMEN;RIB CAGE
LOCATION: CHEST

## 2023-09-04 ASSESSMENT — PAIN DESCRIPTION - DESCRIPTORS
DESCRIPTORS: ACHING
DESCRIPTORS: ACHING

## 2023-09-04 ASSESSMENT — PAIN SCALES - GENERAL
PAINLEVEL_OUTOF10: 10
PAINLEVEL_OUTOF10: 2
PAINLEVEL_OUTOF10: 10

## 2023-09-04 ASSESSMENT — PAIN DESCRIPTION - ORIENTATION
ORIENTATION: RIGHT
ORIENTATION: RIGHT

## 2023-09-04 ASSESSMENT — PAIN - FUNCTIONAL ASSESSMENT: PAIN_FUNCTIONAL_ASSESSMENT: 0-10

## 2023-09-04 NOTE — ED NOTES
Pt states \"I am suppose to wear home oxygen all day at home but I do not\".       Torsten Denis RN  09/04/23 [id number]
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
## 2023-09-04 NOTE — ED NOTES
Ticket to ride completed.  The following information was reported off:  Name  Allergies  Orientation Level  Destination  Safety Issues  Code Status  Oxygen Requirements  Special needs including mobility, language, communication       Kristina Gonzalez RN  09/04/23 [id number]

## 2023-09-05 NOTE — ED NOTES
Discharge instructions reviewed with paient and family including home meds and Incentive spirometry. Patient aware CT contrast administered. Ordered to hold Metformin for 48 hrs. Encouraged to f/u with PCP. Wheelchair.       Brayden Trujillo RN  09/04/23 2031

## 2023-09-05 NOTE — DISCHARGE INSTRUCTIONS
Continue hydrocodone as needed at home. Call your doctor for follow-up. Use oxygen 24 hours a day as directed.

## 2023-12-04 PROBLEM — E78.5 HYPERLIPIDEMIA: Status: ACTIVE | Noted: 2023-12-04

## 2023-12-04 PROBLEM — J44.9 CHRONIC OBSTRUCTIVE PULMONARY DISEASE (MULTI): Status: ACTIVE | Noted: 2023-12-04

## 2023-12-04 PROBLEM — E11.9 DIABETES MELLITUS (MULTI): Status: ACTIVE | Noted: 2023-12-04

## 2023-12-04 PROBLEM — I20.9 ANGINA PECTORIS (CMS-HCC): Status: ACTIVE | Noted: 2023-12-04

## 2023-12-04 PROBLEM — R07.9 CHEST PAIN: Status: ACTIVE | Noted: 2023-12-04

## 2023-12-04 PROBLEM — I10 ESSENTIAL HYPERTENSION: Status: ACTIVE | Noted: 2023-12-04

## 2023-12-04 PROBLEM — R06.89 DIFFICULTY BREATHING: Status: ACTIVE | Noted: 2023-12-04

## 2023-12-04 PROBLEM — R94.39 ABNORMAL STRESS TEST: Status: ACTIVE | Noted: 2023-12-04

## 2023-12-04 RX ORDER — METOPROLOL SUCCINATE 50 MG/1
TABLET, EXTENDED RELEASE ORAL
COMMUNITY
Start: 2021-08-16

## 2023-12-04 RX ORDER — ROSUVASTATIN CALCIUM 20 MG/1
1 TABLET, COATED ORAL DAILY
COMMUNITY
Start: 2020-11-12

## 2023-12-04 RX ORDER — METFORMIN HYDROCHLORIDE 500 MG/1
500 TABLET, EXTENDED RELEASE ORAL
COMMUNITY
Start: 2021-08-16

## 2023-12-04 RX ORDER — PEN NEEDLE, DIABETIC 31 GX5/16"
NEEDLE, DISPOSABLE MISCELLANEOUS
COMMUNITY
Start: 2023-03-26

## 2023-12-04 RX ORDER — INSULIN GLARGINE 100 [IU]/ML
INJECTION, SOLUTION SUBCUTANEOUS
COMMUNITY
Start: 2021-08-22

## 2023-12-04 RX ORDER — LEVOTHYROXINE SODIUM 75 UG/1
75 TABLET ORAL
COMMUNITY
Start: 2020-11-12

## 2023-12-04 RX ORDER — LISINOPRIL 10 MG/1
1 TABLET ORAL DAILY
COMMUNITY
Start: 2021-08-16

## 2023-12-04 RX ORDER — OMEPRAZOLE 20 MG/1
1 CAPSULE, DELAYED RELEASE ORAL DAILY
COMMUNITY

## 2023-12-04 RX ORDER — TRIAMTERENE AND HYDROCHLOROTHIAZIDE 75; 50 MG/1; MG/1
1 TABLET ORAL DAILY
COMMUNITY
Start: 2021-08-16

## 2023-12-04 RX ORDER — HYDROCODONE BITARTRATE AND ACETAMINOPHEN 7.5; 325 MG/1; MG/1
1 TABLET ORAL EVERY 6 HOURS
COMMUNITY
Start: 2021-08-25

## 2023-12-04 RX ORDER — NITROGLYCERIN 0.4 MG/1
TABLET SUBLINGUAL
COMMUNITY
Start: 2022-02-22 | End: 2023-12-05 | Stop reason: SDUPTHER

## 2023-12-04 RX ORDER — FLUTICASONE PROPIONATE 50 MCG
1 SPRAY, SUSPENSION (ML) NASAL DAILY
COMMUNITY

## 2023-12-04 RX ORDER — MIRABEGRON 50 MG/1
TABLET, FILM COATED, EXTENDED RELEASE ORAL
COMMUNITY
Start: 2022-01-26

## 2023-12-04 RX ORDER — BUPROPION HYDROCHLORIDE 300 MG/1
1 TABLET ORAL DAILY
COMMUNITY
Start: 2022-02-16

## 2023-12-04 RX ORDER — ASPIRIN 81 MG/1
1 TABLET ORAL DAILY
COMMUNITY

## 2023-12-04 RX ORDER — GABAPENTIN 600 MG/1
600 TABLET ORAL 4 TIMES DAILY
COMMUNITY
Start: 2022-01-24

## 2023-12-04 RX ORDER — CYCLOBENZAPRINE HCL 10 MG
1 TABLET ORAL 3 TIMES DAILY
COMMUNITY
Start: 2021-01-19

## 2023-12-04 RX ORDER — MODAFINIL 200 MG/1
1.5 TABLET ORAL DAILY
COMMUNITY
Start: 2021-06-04

## 2023-12-04 RX ORDER — ALBUTEROL SULFATE 90 UG/1
2 AEROSOL, METERED RESPIRATORY (INHALATION) EVERY 4 HOURS PRN
COMMUNITY
Start: 2020-08-19

## 2023-12-05 ENCOUNTER — OFFICE VISIT (OUTPATIENT)
Dept: CARDIOLOGY | Facility: CLINIC | Age: 73
End: 2023-12-05
Payer: MEDICARE

## 2023-12-05 VITALS
SYSTOLIC BLOOD PRESSURE: 118 MMHG | DIASTOLIC BLOOD PRESSURE: 66 MMHG | HEIGHT: 64 IN | WEIGHT: 210 LBS | BODY MASS INDEX: 35.85 KG/M2 | HEART RATE: 78 BPM

## 2023-12-05 DIAGNOSIS — I10 ESSENTIAL HYPERTENSION: ICD-10-CM

## 2023-12-05 DIAGNOSIS — J44.9 CHRONIC OBSTRUCTIVE PULMONARY DISEASE, UNSPECIFIED COPD TYPE (MULTI): ICD-10-CM

## 2023-12-05 DIAGNOSIS — E78.2 MIXED HYPERLIPIDEMIA: ICD-10-CM

## 2023-12-05 DIAGNOSIS — R06.89 DIFFICULTY BREATHING: ICD-10-CM

## 2023-12-05 DIAGNOSIS — R94.39 ABNORMAL STRESS TEST: ICD-10-CM

## 2023-12-05 DIAGNOSIS — R07.9 CHEST PAIN, UNSPECIFIED TYPE: Primary | ICD-10-CM

## 2023-12-05 PROBLEM — I20.9 ANGINA PECTORIS (CMS-HCC): Status: RESOLVED | Noted: 2023-12-04 | Resolved: 2023-12-05

## 2023-12-05 PROCEDURE — 1160F RVW MEDS BY RX/DR IN RCRD: CPT | Performed by: INTERNAL MEDICINE

## 2023-12-05 PROCEDURE — 1036F TOBACCO NON-USER: CPT | Performed by: INTERNAL MEDICINE

## 2023-12-05 PROCEDURE — 3008F BODY MASS INDEX DOCD: CPT | Performed by: INTERNAL MEDICINE

## 2023-12-05 PROCEDURE — 3074F SYST BP LT 130 MM HG: CPT | Performed by: INTERNAL MEDICINE

## 2023-12-05 PROCEDURE — 4010F ACE/ARB THERAPY RXD/TAKEN: CPT | Performed by: INTERNAL MEDICINE

## 2023-12-05 PROCEDURE — 3078F DIAST BP <80 MM HG: CPT | Performed by: INTERNAL MEDICINE

## 2023-12-05 PROCEDURE — 99214 OFFICE O/P EST MOD 30 MIN: CPT | Performed by: INTERNAL MEDICINE

## 2023-12-05 PROCEDURE — 1159F MED LIST DOCD IN RCRD: CPT | Performed by: INTERNAL MEDICINE

## 2023-12-05 RX ORDER — NITROGLYCERIN 0.4 MG/1
0.4 TABLET SUBLINGUAL EVERY 5 MIN PRN
Qty: 25 TABLET | Refills: 11 | Status: SHIPPED | OUTPATIENT
Start: 2023-12-05 | End: 2024-01-04

## 2023-12-05 NOTE — PROGRESS NOTES
"Subjective   Citlalli Multani is a 73 y.o. female       Chief Complaint    Annual Exam          HPI      Patient is here for follow-up care management for previous evaluation for shortness of breath, chest pain and abnormal stress test which led to cardiac catheterization that showed no significant obstructive disease, hypertension, hyperlipidemia and obesity.  Since last time I saw her she denies change in cardiac status or symptoms.  Continues to complain of shortness of breath.  She also reported occasional chest pain relieved with nitroglycerin.  She denies lightheadedness, dizziness or syncope.  Her recent laboratory data noted and reviewed with her.    ASSESSMENT:      1. Continued complaint of class 2 to 3 shortness of breath related to lung disease. She is on home O2 therapy.  She will follow-up with pulmonary service  2. Prior evaluation for episode of chest pain suspicious of angina abnormal stress test. Recent cardiac catheterization showed no significant obstructive disease.  She report resolution of episode of chest pain with nitroglycerin suggestive could be esophageal spasm  3.. Hypertension, controlled.  4.. Hyperlipidemia, reasonably controlled   5. Obesity   5. Formed smoker.  6. Social stress and depression treatment     RECOMMENDATION:      1. The patient was counseled regarding losing weight, exercise, and risk factor adjustment.  2. I reviewed the results of her recent cath with her. I recommended continuation of current therapy  3.  I gave her the permission to use nitroglycerin as needed which I suspect likely due to her chest pain likely due to esophageal spasm  4. Symptoms of depression I advised her to discuss that with her PCP  5. I will see her back in the office in 1 year in follow-up      Review of Systems   All other systems reviewed and are negative.         Visit Vitals  /66 (BP Location: Right arm, Patient Position: Sitting)   Pulse 78   Ht 1.626 m (5' 4\")   Wt 95.3 kg (210 " "lb)   BMI 36.05 kg/m²   Smoking Status Former   BSA 2.07 m²        Objective   Physical Exam  Constitutional:       Appearance: Normal appearance. She is normal weight.   HENT:      Nose: Nose normal.   Neck:      Vascular: No carotid bruit.   Cardiovascular:      Rate and Rhythm: Normal rate.      Pulses: Normal pulses.      Heart sounds: Normal heart sounds.   Pulmonary:      Effort: Pulmonary effort is normal.   Abdominal:      General: Bowel sounds are normal.      Palpations: Abdomen is soft.   Genitourinary:     Rectum: Normal.   Musculoskeletal:         General: Normal range of motion.      Cervical back: Normal range of motion.      Right lower leg: No edema.      Left lower leg: No edema.   Skin:     General: Skin is warm and dry.   Neurological:      General: No focal deficit present.      Mental Status: She is alert.   Psychiatric:         Mood and Affect: Mood normal.         Behavior: Behavior normal.         Thought Content: Thought content normal.         Judgment: Judgment normal.         Current Medications    Current Outpatient Medications:     albuterol (Ventolin HFA) 90 mcg/actuation inhaler, Inhale 2 puffs every 4 hours if needed., Disp: , Rfl:     aspirin 81 mg EC tablet, Take 1 tablet (81 mg) by mouth once daily., Disp: , Rfl:     BD Ultra-Fine Mini Pen Needle 31 gauge x 3/16\" needle, , Disp: , Rfl:     buPROPion XL (Wellbutrin XL) 300 mg 24 hr tablet, Take 1 tablet (300 mg) by mouth once daily., Disp: , Rfl:     cyclobenzaprine (Flexeril) 10 mg tablet, Take 1 tablet (10 mg) by mouth 3 times a day., Disp: , Rfl:     fish oil concentrate (Omega-3) 120-180 mg capsule, Take 1 capsule (1 g) by mouth once daily., Disp: , Rfl:     fluticasone (Flonase) 50 mcg/actuation nasal spray, Administer 1 spray into affected nostril(s) once daily., Disp: , Rfl:     gabapentin (Neurontin) 600 mg tablet, Take 1 tablet (600 mg) by mouth 4 times a day., Disp: , Rfl:     HYDROcodone-acetaminophen (Norco) 7.5-325 mg " tablet, Take 1 tablet by mouth every 6 hours., Disp: , Rfl:     Lantus Solostar U-100 Insulin 100 unit/mL (3 mL) pen, Inject under the skin., Disp: , Rfl:     lisinopril 10 mg tablet, Take 1 tablet (10 mg) by mouth once daily., Disp: , Rfl:     metFORMIN  mg 24 hr tablet, Take 1 tablet (500 mg) by mouth 2 times a day with meals., Disp: , Rfl:     metoprolol succinate XL (Toprol-XL) 50 mg 24 hr tablet, Take by mouth. Take 2 and 1/2 tabs and night, Disp: , Rfl:     modafinil (Provigil) 200 mg tablet, Take 1.5 tablets (300 mg) by mouth once daily., Disp: , Rfl:     Myrbetriq 50 mg tablet extended release 24 hr 24 hr tablet, Take by mouth., Disp: , Rfl:     omeprazole (PriLOSEC) 20 mg DR capsule, Take 1 capsule (20 mg) by mouth once daily., Disp: , Rfl:     rosuvastatin (Crestor) 20 mg tablet, Take 1 tablet (20 mg) by mouth once daily., Disp: , Rfl:     Synthroid 75 mcg tablet, Take 1 tablet (75 mcg) by mouth once daily., Disp: , Rfl:     triamterene-hydrochlorothiazid (Maxzide) 75-50 mg tablet, Take 1 tablet by mouth once daily., Disp: , Rfl:     nitroglycerin (Nitrostat) 0.4 mg SL tablet, Place 1 tablet (0.4 mg) under the tongue every 5 minutes if needed for chest pain., Disp: 25 tablet, Rfl: 11                     Assessment/Plan   1. Chest pain, unspecified type  Follow Up In Cardiology    nitroglycerin (Nitrostat) 0.4 mg SL tablet      2. Essential hypertension  Follow Up In Cardiology      3. Mixed hyperlipidemia  Follow Up In Cardiology      4. Abnormal stress test        5. Difficulty breathing        6. Chronic obstructive pulmonary disease, unspecified COPD type (CMS/HCC)        7. BMI 36.0-36.9,adult

## 2024-03-27 ENCOUNTER — HOSPITAL ENCOUNTER (OUTPATIENT)
Dept: WOMENS IMAGING | Age: 74
Discharge: HOME | End: 2024-03-29
Payer: MEDICARE

## 2024-03-27 VITALS — WEIGHT: 220 LBS | BODY MASS INDEX: 36.65 KG/M2 | HEIGHT: 65 IN

## 2024-03-27 DIAGNOSIS — Z12.39 SCREENING BREAST EXAMINATION: ICD-10-CM

## 2024-03-27 PROCEDURE — 77067 SCR MAMMO BI INCL CAD: CPT

## 2024-12-05 ENCOUNTER — APPOINTMENT (OUTPATIENT)
Dept: CARDIOLOGY | Facility: CLINIC | Age: 74
End: 2024-12-05
Payer: MEDICARE

## 2024-12-05 VITALS
DIASTOLIC BLOOD PRESSURE: 60 MMHG | BODY MASS INDEX: 36.32 KG/M2 | SYSTOLIC BLOOD PRESSURE: 114 MMHG | HEIGHT: 65 IN | WEIGHT: 218 LBS | HEART RATE: 72 BPM

## 2024-12-05 DIAGNOSIS — G47.33 OSA (OBSTRUCTIVE SLEEP APNEA): ICD-10-CM

## 2024-12-05 DIAGNOSIS — I10 ESSENTIAL HYPERTENSION: ICD-10-CM

## 2024-12-05 DIAGNOSIS — R60.0 LOCALIZED EDEMA: ICD-10-CM

## 2024-12-05 DIAGNOSIS — R07.9 CHEST PAIN, UNSPECIFIED TYPE: ICD-10-CM

## 2024-12-05 DIAGNOSIS — E78.2 MIXED HYPERLIPIDEMIA: ICD-10-CM

## 2024-12-05 DIAGNOSIS — Z87.891 FORMER SMOKER: ICD-10-CM

## 2024-12-05 DIAGNOSIS — J44.9 CHRONIC OBSTRUCTIVE PULMONARY DISEASE, UNSPECIFIED COPD TYPE (MULTI): ICD-10-CM

## 2024-12-05 DIAGNOSIS — R94.39 ABNORMAL STRESS TEST: ICD-10-CM

## 2024-12-05 DIAGNOSIS — R06.89 DIFFICULTY BREATHING: Primary | ICD-10-CM

## 2024-12-05 PROBLEM — R60.9 EDEMA: Status: ACTIVE | Noted: 2024-12-05

## 2024-12-05 PROCEDURE — 3008F BODY MASS INDEX DOCD: CPT | Performed by: INTERNAL MEDICINE

## 2024-12-05 PROCEDURE — 1036F TOBACCO NON-USER: CPT | Performed by: INTERNAL MEDICINE

## 2024-12-05 PROCEDURE — 4010F ACE/ARB THERAPY RXD/TAKEN: CPT | Performed by: INTERNAL MEDICINE

## 2024-12-05 PROCEDURE — 3078F DIAST BP <80 MM HG: CPT | Performed by: INTERNAL MEDICINE

## 2024-12-05 PROCEDURE — G2211 COMPLEX E/M VISIT ADD ON: HCPCS | Performed by: INTERNAL MEDICINE

## 2024-12-05 PROCEDURE — 1159F MED LIST DOCD IN RCRD: CPT | Performed by: INTERNAL MEDICINE

## 2024-12-05 PROCEDURE — 3074F SYST BP LT 130 MM HG: CPT | Performed by: INTERNAL MEDICINE

## 2024-12-05 PROCEDURE — 99214 OFFICE O/P EST MOD 30 MIN: CPT | Performed by: INTERNAL MEDICINE

## 2024-12-05 RX ORDER — FUROSEMIDE 20 MG/1
20 TABLET ORAL AS NEEDED
Qty: 10 TABLET | Refills: 5 | Status: SHIPPED | OUTPATIENT
Start: 2024-12-05 | End: 2025-12-05

## 2024-12-05 NOTE — Clinical Note
December 5, 2024     No Recipients    Patient: Citlalli Multani   YOB: 1950   Date of Visit: 12/5/2024       Dear Dr. Gabriel Recipients:    Thank you for referring Citlalli Multani to me for evaluation. Below are my notes for this consultation.  If you have questions, please do not hesitate to call me. I look forward to following your patient along with you.       Sincerely,     Steven Zapata MD      CC: No Recipients  ______________________________________________________________________________________

## 2024-12-05 NOTE — PATIENT INSTRUCTIONS
Please bring all medicines, vitamins, and herbal supplements with you when you come to the office.    Prescriptions will not be filled unless you are compliant with your follow up appointments or have a follow up appointment scheduled as per instruction of your physician. Refills should be requested at the time of your visit.     BMI was above normal measurement. Current weight: 98.9 kg (218 lb)  Weight change since last visit (-) denotes wt loss 8 lbs   Weight loss needed to achieve BMI 25: 68.1 Lbs  Weight loss needed to achieve BMI 30: 38.1 Lbs  Provided instructions on dietary changes.

## 2024-12-05 NOTE — PROGRESS NOTES
Subjective   Citlalli Multani is a 74 y.o. female       Chief Complaint    Annual Exam          HPI       Patient is here for follow-up continue management for previous evaluation for shortness of breath atypical chest pain which ultimately led to a negative cardiac catheterization.  Her shortness of breath had been attributed to lung disease and sleep apnea..  She is on oxygen therapy and CPAP.  She denies chest pain, lightheadedness, dizziness or syncope.  Patient noticed slight edema and her weight slightly up    ASSESSMENT:      1. Continued complaint of class 2 to 3 shortness of breath related to lung disease. She is on home O2 therapy.  She will follow-up with pulmonary service  2. Prior evaluation for episode of chest pain suspicious of angina abnormal stress test.  Her cardiac catheterization showed no significant obstructive disease.  In the past she reported episode of chest pain resolved with nitroglycerin suggestive of esophageal spasm but has not had any recently  3.. Hypertension, controlled.  4.. Hyperlipidemia, reasonably controlled   5. Obesity   5. Formed smoker.  6.  Obstructive sleep apnea on CPAP  7.  Intermittent edema rule out pulmonary hypertension  RECOMMENDATION:      1. The patient was counseled regarding losing weight, exercise, and risk factor adjustment.  2. I reviewed the results of her previous advised her cath with her. I recommended continuation of current therapy  3.  I gave her the permission to use nitroglycerin as needed which I suspect likely due to her chest pain likely due to esophageal spasm  4.  I advised her to repeat her echocardiogram in view of her shortness of breath and edema to assess for pulmonary hypertension  5.  I advised her to use Lasix 20 mg as needed if she develop worsening edema  6. I will see her back in the office in 9-month  Review of Systems   All other systems reviewed and are negative.           Vitals:    12/05/24 1552   BP: 114/60   BP Location:  "Right arm   Patient Position: Sitting   Pulse: 72   Weight: 98.9 kg (218 lb)   Height: 1.651 m (5' 5\")        Objective   Physical Exam  Constitutional:       Appearance: Normal appearance.   HENT:      Nose: Nose normal.   Neck:      Vascular: No carotid bruit.   Cardiovascular:      Rate and Rhythm: Normal rate.      Pulses: Normal pulses.      Heart sounds: Normal heart sounds.   Pulmonary:      Effort: Pulmonary effort is normal.   Abdominal:      General: Bowel sounds are normal.      Palpations: Abdomen is soft.   Musculoskeletal:         General: Normal range of motion.      Cervical back: Normal range of motion.      Right lower le+ Pitting Edema present.      Left lower le+ Pitting Edema present.   Skin:     General: Skin is warm and dry.   Neurological:      General: No focal deficit present.      Mental Status: She is alert.   Psychiatric:         Mood and Affect: Mood normal.         Behavior: Behavior normal.         Thought Content: Thought content normal.         Judgment: Judgment normal.         Allergies  Statins-hmg-coa reductase inhibitors     Current Medications    Current Outpatient Medications:     albuterol (Ventolin HFA) 90 mcg/actuation inhaler, Inhale 2 puffs every 4 hours if needed., Disp: , Rfl:     aspirin 81 mg EC tablet, Take 1 tablet (81 mg) by mouth once daily., Disp: , Rfl:     BD Ultra-Fine Mini Pen Needle 31 gauge x 3/16\" needle, , Disp: , Rfl:     buPROPion XL (Wellbutrin XL) 300 mg 24 hr tablet, Take 1 tablet (300 mg) by mouth once daily., Disp: , Rfl:     cyclobenzaprine (Flexeril) 10 mg tablet, Take 1 tablet (10 mg) by mouth 3 times a day., Disp: , Rfl:     fish oil concentrate (Omega-3) 120-180 mg capsule, Take 1 capsule (1 g) by mouth once daily., Disp: , Rfl:     fluticasone (Flonase) 50 mcg/actuation nasal spray, Administer 1 spray into affected nostril(s) once daily., Disp: , Rfl:     gabapentin (Neurontin) 600 mg tablet, Take 1 tablet (600 mg) by mouth 4 times a " day., Disp: , Rfl:     HYDROcodone-acetaminophen (Norco) 7.5-325 mg tablet, Take 1 tablet by mouth every 6 hours., Disp: , Rfl:     Lantus Solostar U-100 Insulin 100 unit/mL (3 mL) pen, Inject under the skin., Disp: , Rfl:     lisinopril 10 mg tablet, Take 1 tablet (10 mg) by mouth once daily., Disp: , Rfl:     metFORMIN  mg 24 hr tablet, Take 1 tablet (500 mg) by mouth 2 times daily (morning and late afternoon)., Disp: , Rfl:     metoprolol succinate XL (Toprol-XL) 50 mg 24 hr tablet, Take by mouth. Take 2 and 1/2 tabs and night, Disp: , Rfl:     modafinil (Provigil) 200 mg tablet, Take 1.5 tablets (300 mg) by mouth once daily., Disp: , Rfl:     Myrbetriq 50 mg tablet extended release 24 hr 24 hr tablet, Take by mouth., Disp: , Rfl:     nitroglycerin (Nitrostat) 0.4 mg SL tablet, Place 1 tablet (0.4 mg) under the tongue every 5 minutes if needed for chest pain., Disp: 25 tablet, Rfl: 11    omeprazole (PriLOSEC) 20 mg DR capsule, Take 1 capsule (20 mg) by mouth once daily., Disp: , Rfl:     rosuvastatin (Crestor) 20 mg tablet, Take 1 tablet (20 mg) by mouth once daily., Disp: , Rfl:     Synthroid 75 mcg tablet, Take 1 tablet (75 mcg) by mouth once daily., Disp: , Rfl:     triamterene-hydrochlorothiazid (Maxzide) 75-50 mg tablet, Take 1 tablet by mouth once daily., Disp: , Rfl:     furosemide (Lasix) 20 mg tablet, Take 1 tablet (20 mg) by mouth if needed (swelling)., Disp: 10 tablet, Rfl: 5                     Assessment/Plan   1. Difficulty breathing  Transthoracic Echo Complete      2. Chest pain, unspecified type  Follow Up In Cardiology      3. Essential hypertension  Follow Up In Cardiology    Follow Up In Cardiology    Transthoracic Echo Complete      4. Mixed hyperlipidemia  Follow Up In Cardiology      5. Abnormal stress test        6. Chronic obstructive pulmonary disease, unspecified COPD type (Multi)  Transthoracic Echo Complete      7. SUMMER (obstructive sleep apnea)  Transthoracic Echo Complete       8. Localized edema  furosemide (Lasix) 20 mg tablet      9. Former smoker        10. BMI 36.0-36.9,adult                 Scribe Attestation  By signing my name below, I, Marija WHITAKER LPN Scribe   attest that this documentation has been prepared under the direction and in the presence of Steven Zapata MD.     Provider Attestation - Scribe documentation    All medical record entries made by the Scribe were at my direction and personally dictated by me. I have reviewed the chart and agree that the record accurately reflects my personal performance of the history, physical exam, discussion and plan.

## 2025-01-14 ENCOUNTER — HOSPITAL ENCOUNTER (OUTPATIENT)
Dept: CT IMAGING | Age: 75
Discharge: HOME | End: 2025-01-16
Payer: MEDICARE

## 2025-01-14 DIAGNOSIS — R10.9 ABDOMINAL PAIN, UNSPECIFIED ABDOMINAL LOCATION: ICD-10-CM
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
## 2025-01-14 PROCEDURE — 74176 CT ABD & PELVIS W/O CONTRAST: CPT

## 2025-04-01 ENCOUNTER — HOSPITAL ENCOUNTER (OUTPATIENT)
Dept: CARDIOLOGY | Facility: CLINIC | Age: 75
Discharge: HOME | End: 2025-04-01
Payer: MEDICARE

## 2025-04-01 VITALS
WEIGHT: 218 LBS | SYSTOLIC BLOOD PRESSURE: 118 MMHG | HEIGHT: 65 IN | DIASTOLIC BLOOD PRESSURE: 60 MMHG | BODY MASS INDEX: 36.32 KG/M2

## 2025-04-01 DIAGNOSIS — J44.9 CHRONIC OBSTRUCTIVE PULMONARY DISEASE, UNSPECIFIED COPD TYPE (MULTI): ICD-10-CM

## 2025-04-01 DIAGNOSIS — R06.89 DIFFICULTY BREATHING: ICD-10-CM

## 2025-04-01 DIAGNOSIS — G47.33 OSA (OBSTRUCTIVE SLEEP APNEA): ICD-10-CM

## 2025-04-01 DIAGNOSIS — I10 ESSENTIAL HYPERTENSION: ICD-10-CM

## 2025-04-01 PROCEDURE — 93306 TTE W/DOPPLER COMPLETE: CPT

## 2025-04-01 PROCEDURE — 93306 TTE W/DOPPLER COMPLETE: CPT | Performed by: INTERNAL MEDICINE

## 2025-04-02 LAB
AORTIC VALVE MEAN GRADIENT: 4 MMHG
AORTIC VALVE PEAK VELOCITY: 1.31 M/S
AV PEAK GRADIENT: 7 MMHG
AVA (PEAK VEL): 2.51 CM2
AVA (VTI): 2.51 CM2
EJECTION FRACTION APICAL 4 CHAMBER: 70.9
EJECTION FRACTION: 58 %
LEFT ATRIUM VOLUME AREA LENGTH INDEX BSA: 18.7 ML/M2
LEFT VENTRICLE INTERNAL DIMENSION DIASTOLE: 5.16 CM (ref 3.5–6)
LEFT VENTRICULAR OUTFLOW TRACT DIAMETER: 2.13 CM
LV EJECTION FRACTION BIPLANE: 64 %
MITRAL VALVE E/A RATIO: 0.73
MITRAL VALVE E/E' RATIO: 8.17
RIGHT VENTRICLE FREE WALL PEAK S': 7.62 CM/S

## 2025-04-09 ENCOUNTER — HOSPITAL ENCOUNTER (EMERGENCY)
Age: 75
Discharge: ANOTHER HOSPITAL | End: 2025-04-09
Attending: EMERGENCY MEDICINE
Payer: MEDICARE

## 2025-04-09 ENCOUNTER — HOSPITAL ENCOUNTER (INPATIENT)
Age: 75
LOS: 7 days | Discharge: NURSING, REHAB OR LONG-TERM CARE | DRG: 915 | End: 2025-04-16
Attending: INTERNAL MEDICINE | Admitting: INTERNAL MEDICINE
Payer: MEDICARE

## 2025-04-09 VITALS
SYSTOLIC BLOOD PRESSURE: 106 MMHG | HEART RATE: 93 BPM | TEMPERATURE: 97.8 F | DIASTOLIC BLOOD PRESSURE: 76 MMHG | RESPIRATION RATE: 25 BRPM | WEIGHT: 202 LBS | BODY MASS INDEX: 33.61 KG/M2 | OXYGEN SATURATION: 96 %

## 2025-04-09 DIAGNOSIS — R29.6 RECURRENT FALLS: Primary | ICD-10-CM

## 2025-04-09 DIAGNOSIS — S06.5XAA SDH (SUBDURAL HEMATOMA) (HCC): ICD-10-CM

## 2025-04-09 DIAGNOSIS — M79.89 PAIN AND SWELLING OF LEFT FOREARM: ICD-10-CM

## 2025-04-09 DIAGNOSIS — T78.3XXA ANGIOEDEMA, INITIAL ENCOUNTER: Primary | ICD-10-CM

## 2025-04-09 DIAGNOSIS — I80.9 PHLEBITIS: ICD-10-CM

## 2025-04-09 DIAGNOSIS — M79.632 PAIN AND SWELLING OF LEFT FOREARM: ICD-10-CM

## 2025-04-09 LAB
ABO + RH BLD: NORMAL
ANION GAP SERPL CALCULATED.3IONS-SCNC: 12 MMOL/L (ref 9–16)
ARM BAND NUMBER: NORMAL
BASOPHILS # BLD: 0.05 K/UL (ref 0–0.2)
BASOPHILS NFR BLD: 1 % (ref 0–2)
BLOOD BANK SAMPLE EXPIRATION: NORMAL
BLOOD GROUP ANTIBODIES SERPL: NEGATIVE
BUN SERPL-MCNC: 26 MG/DL (ref 8–23)
BUN/CREAT SERPL: 12 (ref 9–20)
CALCIUM SERPL-MCNC: 7.6 MG/DL (ref 8.6–10.4)
CHLORIDE SERPL-SCNC: 102 MMOL/L (ref 98–107)
CO2 SERPL-SCNC: 26 MMOL/L (ref 20–31)
CREAT SERPL-MCNC: 2.1 MG/DL (ref 0.5–0.9)
EOSINOPHIL # BLD: 0.19 K/UL (ref 0–0.44)
EOSINOPHILS RELATIVE PERCENT: 3 % (ref 1–4)
ERYTHROCYTE [DISTWIDTH] IN BLOOD BY AUTOMATED COUNT: 13.4 % (ref 11.8–14.4)
GFR, ESTIMATED: 24 ML/MIN/1.73M2
GLUCOSE BLD-MCNC: 124 MG/DL (ref 65–105)
GLUCOSE SERPL-MCNC: 100 MG/DL (ref 74–99)
HCT VFR BLD AUTO: 40.4 % (ref 36.3–47.1)
HGB BLD-MCNC: 13.5 G/DL (ref 11.9–15.1)
IMM GRANULOCYTES # BLD AUTO: 0.03 K/UL (ref 0–0.3)
IMM GRANULOCYTES NFR BLD: 0 %
LYMPHOCYTES NFR BLD: 2.89 K/UL (ref 1.1–3.7)
LYMPHOCYTES RELATIVE PERCENT: 40 % (ref 24–43)
MCH RBC QN AUTO: 29.8 PG (ref 25.2–33.5)
MCHC RBC AUTO-ENTMCNC: 33.4 G/DL (ref 28.4–34.8)
MCV RBC AUTO: 89.2 FL (ref 82.6–102.9)
MONOCYTES NFR BLD: 0.37 K/UL (ref 0.1–1.2)
MONOCYTES NFR BLD: 5 % (ref 3–12)
NEUTROPHILS NFR BLD: 51 % (ref 36–65)
NEUTS SEG NFR BLD: 3.71 K/UL (ref 1.5–8.1)
NRBC BLD-RTO: 0 PER 100 WBC
PLATELET # BLD AUTO: 165 K/UL (ref 138–453)
PMV BLD AUTO: 10.7 FL (ref 8.1–13.5)
POTASSIUM SERPL-SCNC: 4.1 MMOL/L (ref 3.7–5.3)
RBC # BLD AUTO: 4.53 M/UL (ref 3.95–5.11)
SODIUM SERPL-SCNC: 140 MMOL/L (ref 136–145)
WBC OTHER # BLD: 7.2 K/UL (ref 3.5–11.3)

## 2025-04-09 PROCEDURE — 6360000002 HC RX W HCPCS: Performed by: EMERGENCY MEDICINE

## 2025-04-09 PROCEDURE — 86927 PLASMA FRESH FROZEN: CPT

## 2025-04-09 PROCEDURE — 96374 THER/PROPH/DIAG INJ IV PUSH: CPT

## 2025-04-09 PROCEDURE — 85025 COMPLETE CBC W/AUTO DIFF WBC: CPT

## 2025-04-09 PROCEDURE — 86850 RBC ANTIBODY SCREEN: CPT

## 2025-04-09 PROCEDURE — 6360000002 HC RX W HCPCS

## 2025-04-09 PROCEDURE — 2580000003 HC RX 258: Performed by: EMERGENCY MEDICINE

## 2025-04-09 PROCEDURE — 2580000003 HC RX 258

## 2025-04-09 PROCEDURE — 96372 THER/PROPH/DIAG INJ SC/IM: CPT

## 2025-04-09 PROCEDURE — 82947 ASSAY GLUCOSE BLOOD QUANT: CPT

## 2025-04-09 PROCEDURE — 96375 TX/PRO/DX INJ NEW DRUG ADDON: CPT

## 2025-04-09 PROCEDURE — P9017 PLASMA 1 DONOR FRZ W/IN 8 HR: HCPCS

## 2025-04-09 PROCEDURE — 2000000000 HC ICU R&B

## 2025-04-09 PROCEDURE — 86901 BLOOD TYPING SEROLOGIC RH(D): CPT

## 2025-04-09 PROCEDURE — 87641 MR-STAPH DNA AMP PROBE: CPT

## 2025-04-09 PROCEDURE — 99285 EMERGENCY DEPT VISIT HI MDM: CPT

## 2025-04-09 PROCEDURE — 80048 BASIC METABOLIC PNL TOTAL CA: CPT

## 2025-04-09 PROCEDURE — 2500000003 HC RX 250 WO HCPCS

## 2025-04-09 PROCEDURE — 86900 BLOOD TYPING SEROLOGIC ABO: CPT

## 2025-04-09 RX ORDER — 0.9 % SODIUM CHLORIDE 0.9 %
500 INTRAVENOUS SOLUTION INTRAVENOUS ONCE
Status: DISCONTINUED | OUTPATIENT
Start: 2025-04-09 | End: 2025-04-09

## 2025-04-09 RX ORDER — POLYETHYLENE GLYCOL 3350 17 G/17G
17 POWDER, FOR SOLUTION ORAL DAILY PRN
Status: DISCONTINUED | OUTPATIENT
Start: 2025-04-09 | End: 2025-04-16 | Stop reason: HOSPADM

## 2025-04-09 RX ORDER — INSULIN LISPRO 100 [IU]/ML
0-16 INJECTION, SOLUTION INTRAVENOUS; SUBCUTANEOUS EVERY 4 HOURS
Status: DISCONTINUED | OUTPATIENT
Start: 2025-04-09 | End: 2025-04-10

## 2025-04-09 RX ORDER — SODIUM CHLORIDE 9 MG/ML
INJECTION, SOLUTION INTRAVENOUS CONTINUOUS
Status: DISCONTINUED | OUTPATIENT
Start: 2025-04-09 | End: 2025-04-12

## 2025-04-09 RX ORDER — DEXAMETHASONE SODIUM PHOSPHATE 4 MG/ML
4 INJECTION, SOLUTION INTRA-ARTICULAR; INTRALESIONAL; INTRAMUSCULAR; INTRAVENOUS; SOFT TISSUE EVERY 6 HOURS
Status: DISCONTINUED | OUTPATIENT
Start: 2025-04-09 | End: 2025-04-10

## 2025-04-09 RX ORDER — ONDANSETRON 2 MG/ML
4 INJECTION INTRAMUSCULAR; INTRAVENOUS EVERY 6 HOURS PRN
Status: DISCONTINUED | OUTPATIENT
Start: 2025-04-09 | End: 2025-04-16 | Stop reason: HOSPADM

## 2025-04-09 RX ORDER — SODIUM CHLORIDE 9 MG/ML
INJECTION, SOLUTION INTRAVENOUS PRN
Status: DISCONTINUED | OUTPATIENT
Start: 2025-04-09 | End: 2025-04-16 | Stop reason: HOSPADM

## 2025-04-09 RX ORDER — KETOROLAC TROMETHAMINE 15 MG/ML
15 INJECTION, SOLUTION INTRAMUSCULAR; INTRAVENOUS ONCE
Status: COMPLETED | OUTPATIENT
Start: 2025-04-09 | End: 2025-04-09

## 2025-04-09 RX ORDER — SODIUM CHLORIDE 9 MG/ML
INJECTION, SOLUTION INTRAVENOUS PRN
Status: DISCONTINUED | OUTPATIENT
Start: 2025-04-09 | End: 2025-04-09 | Stop reason: HOSPADM

## 2025-04-09 RX ORDER — ASPIRIN 81 MG/1
81 TABLET ORAL DAILY
Status: DISCONTINUED | OUTPATIENT
Start: 2025-04-10 | End: 2025-04-16 | Stop reason: HOSPADM

## 2025-04-09 RX ORDER — GABAPENTIN 300 MG/1
600 CAPSULE ORAL 2 TIMES DAILY
Status: DISCONTINUED | OUTPATIENT
Start: 2025-04-10 | End: 2025-04-11

## 2025-04-09 RX ORDER — ONDANSETRON 4 MG/1
4 TABLET, ORALLY DISINTEGRATING ORAL EVERY 8 HOURS PRN
Status: DISCONTINUED | OUTPATIENT
Start: 2025-04-09 | End: 2025-04-16 | Stop reason: HOSPADM

## 2025-04-09 RX ORDER — ROSUVASTATIN CALCIUM 20 MG/1
20 TABLET, COATED ORAL DAILY
Status: DISCONTINUED | OUTPATIENT
Start: 2025-04-10 | End: 2025-04-16 | Stop reason: HOSPADM

## 2025-04-09 RX ORDER — EPINEPHRINE 1 MG/ML
0.3 INJECTION, SOLUTION, CONCENTRATE INTRAVENOUS ONCE
Status: COMPLETED | OUTPATIENT
Start: 2025-04-09 | End: 2025-04-09

## 2025-04-09 RX ORDER — DIPHENHYDRAMINE HYDROCHLORIDE 50 MG/ML
25 INJECTION, SOLUTION INTRAMUSCULAR; INTRAVENOUS ONCE
Status: COMPLETED | OUTPATIENT
Start: 2025-04-09 | End: 2025-04-09

## 2025-04-09 RX ORDER — ESCITALOPRAM OXALATE 10 MG/1
10 TABLET ORAL DAILY
Status: DISCONTINUED | OUTPATIENT
Start: 2025-04-10 | End: 2025-04-16 | Stop reason: HOSPADM

## 2025-04-09 RX ORDER — 0.9 % SODIUM CHLORIDE 0.9 %
1000 INTRAVENOUS SOLUTION INTRAVENOUS ONCE
Status: COMPLETED | OUTPATIENT
Start: 2025-04-09 | End: 2025-04-09

## 2025-04-09 RX ORDER — DEXTROSE MONOHYDRATE 100 MG/ML
INJECTION, SOLUTION INTRAVENOUS CONTINUOUS PRN
Status: DISCONTINUED | OUTPATIENT
Start: 2025-04-09 | End: 2025-04-16 | Stop reason: HOSPADM

## 2025-04-09 RX ORDER — SODIUM CHLORIDE 9 MG/ML
INJECTION, SOLUTION INTRAVENOUS CONTINUOUS
Status: ACTIVE | OUTPATIENT
Start: 2025-04-09 | End: 2025-04-09

## 2025-04-09 RX ORDER — BUPROPION HYDROCHLORIDE 100 MG/1
100 TABLET ORAL 2 TIMES DAILY
Status: DISCONTINUED | OUTPATIENT
Start: 2025-04-10 | End: 2025-04-16 | Stop reason: HOSPADM

## 2025-04-09 RX ORDER — LEVOTHYROXINE SODIUM 75 UG/1
75 TABLET ORAL DAILY
Status: DISCONTINUED | OUTPATIENT
Start: 2025-04-10 | End: 2025-04-16 | Stop reason: HOSPADM

## 2025-04-09 RX ORDER — GLUCAGON 1 MG/ML
1 KIT INJECTION PRN
Status: DISCONTINUED | OUTPATIENT
Start: 2025-04-09 | End: 2025-04-16 | Stop reason: HOSPADM

## 2025-04-09 RX ORDER — HEPARIN SODIUM 5000 [USP'U]/ML
5000 INJECTION, SOLUTION INTRAVENOUS; SUBCUTANEOUS EVERY 8 HOURS SCHEDULED
Status: DISCONTINUED | OUTPATIENT
Start: 2025-04-09 | End: 2025-04-16 | Stop reason: HOSPADM

## 2025-04-09 RX ORDER — MODAFINIL 100 MG/1
300 TABLET ORAL DAILY
Status: DISCONTINUED | OUTPATIENT
Start: 2025-04-10 | End: 2025-04-16 | Stop reason: HOSPADM

## 2025-04-09 RX ORDER — SODIUM CHLORIDE 0.9 % (FLUSH) 0.9 %
5-40 SYRINGE (ML) INJECTION PRN
Status: DISCONTINUED | OUTPATIENT
Start: 2025-04-09 | End: 2025-04-16 | Stop reason: HOSPADM

## 2025-04-09 RX ORDER — ENOXAPARIN SODIUM 100 MG/ML
30 INJECTION SUBCUTANEOUS DAILY
Status: DISCONTINUED | OUTPATIENT
Start: 2025-04-10 | End: 2025-04-09

## 2025-04-09 RX ORDER — DEXAMETHASONE SODIUM PHOSPHATE 10 MG/ML
10 INJECTION, SOLUTION INTRA-ARTICULAR; INTRALESIONAL; INTRAMUSCULAR; INTRAVENOUS; SOFT TISSUE ONCE
Status: COMPLETED | OUTPATIENT
Start: 2025-04-09 | End: 2025-04-09

## 2025-04-09 RX ORDER — METOPROLOL TARTRATE 50 MG
150 TABLET ORAL NIGHTLY
Status: DISCONTINUED | OUTPATIENT
Start: 2025-04-09 | End: 2025-04-09

## 2025-04-09 RX ORDER — SODIUM CHLORIDE 9 MG/ML
INJECTION, SOLUTION INTRAVENOUS CONTINUOUS
Status: DISCONTINUED | OUTPATIENT
Start: 2025-04-09 | End: 2025-04-09 | Stop reason: HOSPADM

## 2025-04-09 RX ORDER — DIPHENHYDRAMINE HYDROCHLORIDE 50 MG/ML
25 INJECTION, SOLUTION INTRAMUSCULAR; INTRAVENOUS EVERY 6 HOURS PRN
Status: DISCONTINUED | OUTPATIENT
Start: 2025-04-09 | End: 2025-04-16 | Stop reason: HOSPADM

## 2025-04-09 RX ORDER — SODIUM CHLORIDE 0.9 % (FLUSH) 0.9 %
5-40 SYRINGE (ML) INJECTION EVERY 12 HOURS SCHEDULED
Status: DISCONTINUED | OUTPATIENT
Start: 2025-04-09 | End: 2025-04-16 | Stop reason: HOSPADM

## 2025-04-09 RX ADMIN — KETOROLAC TROMETHAMINE 15 MG: 15 INJECTION, SOLUTION INTRAMUSCULAR; INTRAVENOUS at 17:56

## 2025-04-09 RX ADMIN — FAMOTIDINE 20 MG: 10 INJECTION, SOLUTION INTRAVENOUS at 13:49

## 2025-04-09 RX ADMIN — HEPARIN SODIUM 5000 UNITS: 5000 INJECTION INTRAVENOUS; SUBCUTANEOUS at 23:45

## 2025-04-09 RX ADMIN — SODIUM CHLORIDE: 0.9 INJECTION, SOLUTION INTRAVENOUS at 22:09

## 2025-04-09 RX ADMIN — DEXAMETHASONE SODIUM PHOSPHATE 4 MG: 4 INJECTION INTRA-ARTICULAR; INTRALESIONAL; INTRAMUSCULAR; INTRAVENOUS; SOFT TISSUE at 22:05

## 2025-04-09 RX ADMIN — SODIUM CHLORIDE 1000 ML: 0.9 INJECTION, SOLUTION INTRAVENOUS at 14:05

## 2025-04-09 RX ADMIN — SODIUM CHLORIDE, PRESERVATIVE FREE 10 ML: 5 INJECTION INTRAVENOUS at 22:06

## 2025-04-09 RX ADMIN — EPINEPHRINE 0.3 MG: 1 INJECTION, SOLUTION, CONCENTRATE INTRAVENOUS at 13:48

## 2025-04-09 RX ADMIN — DIPHENHYDRAMINE HYDROCHLORIDE 25 MG: 50 INJECTION INTRAMUSCULAR; INTRAVENOUS at 13:45

## 2025-04-09 RX ADMIN — DEXAMETHASONE SODIUM PHOSPHATE 10 MG: 10 INJECTION INTRAMUSCULAR; INTRAVENOUS at 13:45

## 2025-04-09 RX ADMIN — SODIUM CHLORIDE: 0.9 INJECTION, SOLUTION INTRAVENOUS at 16:46

## 2025-04-09 ASSESSMENT — ENCOUNTER SYMPTOMS
BACK PAIN: 0
SHORTNESS OF BREATH: 0
ABDOMINAL DISTENTION: 0
SORE THROAT: 0

## 2025-04-09 ASSESSMENT — PAIN SCALES - GENERAL
PAINLEVEL_OUTOF10: 5
PAINLEVEL_OUTOF10: 4

## 2025-04-09 ASSESSMENT — PAIN DESCRIPTION - LOCATION
LOCATION: HEAD
LOCATION: HEAD

## 2025-04-09 ASSESSMENT — PAIN - FUNCTIONAL ASSESSMENT: PAIN_FUNCTIONAL_ASSESSMENT: ACTIVITIES ARE NOT PREVENTED

## 2025-04-09 NOTE — ED NOTES
AC called to report waitlist for ICU. Asked to check on bed status at [facility] for a sooner transport

## 2025-04-09 NOTE — ED NOTES
Pt reports tightness to left side of nose is improving. Patient continues to have upper lip swelling. No obvious swelling to oral cavity.

## 2025-04-09 NOTE — ED PROVIDER NOTES
[facility] EMERGENCY DEPARTMENT  EMERGENCY DEPARTMENT ENCOUNTER      Pt Name: Rica Enriquez  MRN: [id number]  Birthdate 1950  Date of evaluation: 4/9/2025  Provider: Natalee Triana DO    CHIEF COMPLAINT       Chief Complaint   Patient presents with    Angioedema         HISTORY OF PRESENT ILLNESS   (Location/Symptom, Timing/Onset, Context/Setting, Quality, Duration, Modifying Factors, Severity)  Note limiting factors.   Rica Enriquez is a 74 y.o. female who presents to the emergency department with left sided facial swelling and left-sided lip swelling.  Patient states that she woke up this morning and felt okay however around 11 AM today she noticed that her left side of the lips were swelling in the left side of her face with swelling to but did not come in right away as he thought it would go away.  However did notice that the swelling was getting progressively worse so they came into the hospital for evaluation.  Patient denies any difficulty breathing or talking or swallowing.  She does take lisinopril on a daily basis.  The only new medication that she started was Mounjaro about 2 months ago.  She denies eating any new food items or applying any new face products.  Patient is currently talking in complete sentences and denies any throat tightness.    REVIEW OF SYSTEMS    (2-9 systems for level 4, 10 or more for level 5)     Review of Systems   Constitutional:  Negative for fatigue and fever.   HENT:  Negative for congestion and sore throat.         Left-sided facial swelling and left-sided lip swelling   Eyes:  Negative for visual disturbance.   Respiratory:  Negative for shortness of breath.    Cardiovascular:  Negative for chest pain and palpitations.   Gastrointestinal:  Negative for abdominal distention.   Genitourinary:  Negative for dysuria.   Musculoskeletal:  Negative for back pain.   Skin:  Negative for rash.   Psychiatric/Behavioral:  Negative for suicidal ideas.        Except as noted

## 2025-04-10 LAB
ANION GAP SERPL CALCULATED.3IONS-SCNC: 15 MMOL/L (ref 9–16)
ARM BAND NUMBER: NORMAL
BASOPHILS # BLD: <0.03 K/UL (ref 0–0.2)
BASOPHILS NFR BLD: 0 % (ref 0–2)
BLOOD BANK BLOOD PRODUCT EXPIRATION DATE: NORMAL
BLOOD BANK DISPENSE STATUS: NORMAL
BLOOD BANK ISBT PRODUCT BLOOD TYPE: 8400
BLOOD BANK PRODUCT CODE: NORMAL
BLOOD BANK UNIT TYPE AND RH: NORMAL
BPU ID: NORMAL
BUN SERPL-MCNC: 28 MG/DL (ref 8–23)
CA-I BLD-SCNC: 0.92 MMOL/L (ref 1.13–1.33)
CALCIUM SERPL-MCNC: 6.7 MG/DL (ref 8.6–10.4)
CHLORIDE SERPL-SCNC: 109 MMOL/L (ref 98–107)
CO2 SERPL-SCNC: 19 MMOL/L (ref 20–31)
COMPONENT: NORMAL
CREAT SERPL-MCNC: 1.9 MG/DL (ref 0.6–0.9)
EOSINOPHIL # BLD: <0.03 K/UL (ref 0–0.44)
EOSINOPHILS RELATIVE PERCENT: 0 % (ref 1–4)
ERYTHROCYTE [DISTWIDTH] IN BLOOD BY AUTOMATED COUNT: 13.1 % (ref 11.8–14.4)
GFR, ESTIMATED: 28 ML/MIN/1.73M2
GLUCOSE BLD-MCNC: 105 MG/DL (ref 65–105)
GLUCOSE BLD-MCNC: 108 MG/DL (ref 65–105)
GLUCOSE BLD-MCNC: 112 MG/DL (ref 65–105)
GLUCOSE BLD-MCNC: 113 MG/DL (ref 65–105)
GLUCOSE BLD-MCNC: 114 MG/DL (ref 65–105)
GLUCOSE BLD-MCNC: 121 MG/DL (ref 65–105)
GLUCOSE BLD-MCNC: 173 MG/DL (ref 65–105)
GLUCOSE SERPL-MCNC: 127 MG/DL (ref 74–99)
HCT VFR BLD AUTO: 33.2 % (ref 36.3–47.1)
HGB BLD-MCNC: 10.9 G/DL (ref 11.9–15.1)
IMM GRANULOCYTES # BLD AUTO: 0.03 K/UL (ref 0–0.3)
IMM GRANULOCYTES NFR BLD: 1 %
LYMPHOCYTES NFR BLD: 1.16 K/UL (ref 1.1–3.7)
LYMPHOCYTES RELATIVE PERCENT: 26 % (ref 24–43)
MCH RBC QN AUTO: 29.2 PG (ref 25.2–33.5)
MCHC RBC AUTO-ENTMCNC: 32.8 G/DL (ref 28.4–34.8)
MCV RBC AUTO: 89 FL (ref 82.6–102.9)
MONOCYTES NFR BLD: 0.04 K/UL (ref 0.1–1.2)
MONOCYTES NFR BLD: 1 % (ref 3–12)
MRSA, DNA, NASAL: NEGATIVE
NEUTROPHILS NFR BLD: 72 % (ref 36–65)
NEUTS SEG NFR BLD: 3.22 K/UL (ref 1.5–8.1)
NRBC BLD-RTO: 0 PER 100 WBC
PLATELET # BLD AUTO: 120 K/UL (ref 138–453)
PMV BLD AUTO: 10.8 FL (ref 8.1–13.5)
POTASSIUM SERPL-SCNC: 4.4 MMOL/L (ref 3.7–5.3)
RBC # BLD AUTO: 3.73 M/UL (ref 3.95–5.11)
SODIUM SERPL-SCNC: 143 MMOL/L (ref 136–145)
SPECIMEN DESCRIPTION: NORMAL
TRANSFUSION STATUS: NORMAL
UNIT DIVISION: 0
UNIT ISSUE DATE/TIME: NORMAL
WBC OTHER # BLD: 4.5 K/UL (ref 3.5–11.3)

## 2025-04-10 PROCEDURE — 82947 ASSAY GLUCOSE BLOOD QUANT: CPT

## 2025-04-10 PROCEDURE — 80048 BASIC METABOLIC PNL TOTAL CA: CPT

## 2025-04-10 PROCEDURE — 99221 1ST HOSP IP/OBS SF/LOW 40: CPT

## 2025-04-10 PROCEDURE — 2500000003 HC RX 250 WO HCPCS

## 2025-04-10 PROCEDURE — 2580000003 HC RX 258

## 2025-04-10 PROCEDURE — 36415 COLL VENOUS BLD VENIPUNCTURE: CPT

## 2025-04-10 PROCEDURE — 6370000000 HC RX 637 (ALT 250 FOR IP)

## 2025-04-10 PROCEDURE — 82330 ASSAY OF CALCIUM: CPT

## 2025-04-10 PROCEDURE — 2700000000 HC OXYGEN THERAPY PER DAY

## 2025-04-10 PROCEDURE — 94640 AIRWAY INHALATION TREATMENT: CPT

## 2025-04-10 PROCEDURE — 6360000002 HC RX W HCPCS

## 2025-04-10 PROCEDURE — 1200000000 HC SEMI PRIVATE

## 2025-04-10 PROCEDURE — 85025 COMPLETE CBC W/AUTO DIFF WBC: CPT

## 2025-04-10 PROCEDURE — 94761 N-INVAS EAR/PLS OXIMETRY MLT: CPT

## 2025-04-10 PROCEDURE — 99291 CRITICAL CARE FIRST HOUR: CPT | Performed by: INTERNAL MEDICINE

## 2025-04-10 PROCEDURE — 6370000000 HC RX 637 (ALT 250 FOR IP): Performed by: INTERNAL MEDICINE

## 2025-04-10 RX ORDER — ACETAMINOPHEN 325 MG/1
650 TABLET ORAL EVERY 6 HOURS PRN
Status: DISCONTINUED | OUTPATIENT
Start: 2025-04-10 | End: 2025-04-10

## 2025-04-10 RX ORDER — INSULIN LISPRO 100 [IU]/ML
0-16 INJECTION, SOLUTION INTRAVENOUS; SUBCUTANEOUS
Status: DISCONTINUED | OUTPATIENT
Start: 2025-04-10 | End: 2025-04-16 | Stop reason: HOSPADM

## 2025-04-10 RX ORDER — CALCIUM GLUCONATE 20 MG/ML
2000 INJECTION, SOLUTION INTRAVENOUS PRN
Status: DISCONTINUED | OUTPATIENT
Start: 2025-04-10 | End: 2025-04-16 | Stop reason: HOSPADM

## 2025-04-10 RX ORDER — CALCIUM GLUCONATE 20 MG/ML
4000 INJECTION, SOLUTION INTRAVENOUS PRN
Status: DISCONTINUED | OUTPATIENT
Start: 2025-04-10 | End: 2025-04-16 | Stop reason: HOSPADM

## 2025-04-10 RX ORDER — ACETAMINOPHEN 325 MG/1
650 TABLET ORAL EVERY 4 HOURS PRN
Status: DISCONTINUED | OUTPATIENT
Start: 2025-04-10 | End: 2025-04-16 | Stop reason: HOSPADM

## 2025-04-10 RX ORDER — BUDESONIDE AND FORMOTEROL FUMARATE DIHYDRATE 80; 4.5 UG/1; UG/1
2 AEROSOL RESPIRATORY (INHALATION)
Status: DISCONTINUED | OUTPATIENT
Start: 2025-04-10 | End: 2025-04-16 | Stop reason: HOSPADM

## 2025-04-10 RX ORDER — POTASSIUM CHLORIDE 1500 MG/1
40 TABLET, EXTENDED RELEASE ORAL PRN
Status: DISCONTINUED | OUTPATIENT
Start: 2025-04-10 | End: 2025-04-16 | Stop reason: HOSPADM

## 2025-04-10 RX ORDER — ALBUTEROL SULFATE 90 UG/1
2 INHALANT RESPIRATORY (INHALATION) EVERY 4 HOURS PRN
Status: DISCONTINUED | OUTPATIENT
Start: 2025-04-10 | End: 2025-04-16 | Stop reason: HOSPADM

## 2025-04-10 RX ORDER — MAGNESIUM SULFATE IN WATER 40 MG/ML
2000 INJECTION, SOLUTION INTRAVENOUS PRN
Status: DISCONTINUED | OUTPATIENT
Start: 2025-04-10 | End: 2025-04-16 | Stop reason: HOSPADM

## 2025-04-10 RX ORDER — CALCIUM GLUCONATE 20 MG/ML
3000 INJECTION, SOLUTION INTRAVENOUS PRN
Status: DISCONTINUED | OUTPATIENT
Start: 2025-04-10 | End: 2025-04-16 | Stop reason: HOSPADM

## 2025-04-10 RX ORDER — LORAZEPAM 0.5 MG/1
0.5 TABLET ORAL ONCE
Status: COMPLETED | OUTPATIENT
Start: 2025-04-10 | End: 2025-04-10

## 2025-04-10 RX ORDER — POTASSIUM CHLORIDE 7.45 MG/ML
10 INJECTION INTRAVENOUS PRN
Status: DISCONTINUED | OUTPATIENT
Start: 2025-04-10 | End: 2025-04-16 | Stop reason: HOSPADM

## 2025-04-10 RX ADMIN — SODIUM CHLORIDE: 0.9 INJECTION, SOLUTION INTRAVENOUS at 17:21

## 2025-04-10 RX ADMIN — LORAZEPAM 0.5 MG: 0.5 TABLET ORAL at 02:07

## 2025-04-10 RX ADMIN — BUDESONIDE AND FORMOTEROL FUMARATE DIHYDRATE 2 PUFF: 80; 4.5 AEROSOL RESPIRATORY (INHALATION) at 08:32

## 2025-04-10 RX ADMIN — ASPIRIN 81 MG: 81 TABLET, COATED ORAL at 09:27

## 2025-04-10 RX ADMIN — ACETAMINOPHEN 650 MG: 325 TABLET ORAL at 11:11

## 2025-04-10 RX ADMIN — BUDESONIDE AND FORMOTEROL FUMARATE DIHYDRATE 2 PUFF: 80; 4.5 AEROSOL RESPIRATORY (INHALATION) at 19:51

## 2025-04-10 RX ADMIN — SODIUM CHLORIDE: 0.9 INJECTION, SOLUTION INTRAVENOUS at 03:33

## 2025-04-10 RX ADMIN — ESCITALOPRAM OXALATE 10 MG: 10 TABLET ORAL at 09:27

## 2025-04-10 RX ADMIN — SODIUM CHLORIDE, PRESERVATIVE FREE 10 ML: 5 INJECTION INTRAVENOUS at 09:36

## 2025-04-10 RX ADMIN — HEPARIN SODIUM 5000 UNITS: 5000 INJECTION INTRAVENOUS; SUBCUTANEOUS at 21:43

## 2025-04-10 RX ADMIN — HEPARIN SODIUM 5000 UNITS: 5000 INJECTION INTRAVENOUS; SUBCUTANEOUS at 15:41

## 2025-04-10 RX ADMIN — ACETAMINOPHEN 650 MG: 325 TABLET ORAL at 18:39

## 2025-04-10 RX ADMIN — ROSUVASTATIN CALCIUM 20 MG: 20 TABLET, FILM COATED ORAL at 09:27

## 2025-04-10 RX ADMIN — DEXAMETHASONE SODIUM PHOSPHATE 4 MG: 4 INJECTION INTRA-ARTICULAR; INTRALESIONAL; INTRAMUSCULAR; INTRAVENOUS; SOFT TISSUE at 09:28

## 2025-04-10 RX ADMIN — METOPROLOL SUCCINATE 150 MG: 25 TABLET, EXTENDED RELEASE ORAL at 21:09

## 2025-04-10 RX ADMIN — SODIUM CHLORIDE, PRESERVATIVE FREE 10 ML: 5 INJECTION INTRAVENOUS at 21:10

## 2025-04-10 RX ADMIN — BUPROPION HYDROCHLORIDE 100 MG: 100 TABLET, FILM COATED ORAL at 21:10

## 2025-04-10 RX ADMIN — HEPARIN SODIUM 5000 UNITS: 5000 INJECTION INTRAVENOUS; SUBCUTANEOUS at 05:49

## 2025-04-10 RX ADMIN — DEXAMETHASONE SODIUM PHOSPHATE 4 MG: 4 INJECTION INTRA-ARTICULAR; INTRALESIONAL; INTRAMUSCULAR; INTRAVENOUS; SOFT TISSUE at 04:04

## 2025-04-10 RX ADMIN — FAMOTIDINE 20 MG: 10 INJECTION, SOLUTION INTRAVENOUS at 09:27

## 2025-04-10 RX ADMIN — LEVOTHYROXINE SODIUM 75 MCG: 75 TABLET ORAL at 05:49

## 2025-04-10 RX ADMIN — SODIUM CHLORIDE: 0.9 INJECTION, SOLUTION INTRAVENOUS at 22:17

## 2025-04-10 RX ADMIN — BUPROPION HYDROCHLORIDE 100 MG: 100 TABLET, FILM COATED ORAL at 09:29

## 2025-04-10 ASSESSMENT — PAIN SCALES - GENERAL
PAINLEVEL_OUTOF10: 6
PAINLEVEL_OUTOF10: 7
PAINLEVEL_OUTOF10: 0

## 2025-04-10 ASSESSMENT — PAIN SCALES - WONG BAKER: WONGBAKER_NUMERICALRESPONSE: NO HURT

## 2025-04-10 NOTE — PLAN OF CARE
Problem: Chronic Conditions and Co-morbidities  Goal: Patient's chronic conditions and co-morbidity symptoms are monitored and maintained or improved  Outcome: Progressing     Problem: Discharge Planning  Goal: Discharge to home or other facility with appropriate resources  Outcome: Progressing     Problem: Skin/Tissue Integrity  Goal: Skin integrity remains intact  Description: 1.  Monitor for areas of redness and/or skin breakdown  2.  Assess vascular access sites hourly  3.  Every 4-6 hours minimum:  Change oxygen saturation probe site  4.  Every 4-6 hours:  If on nasal continuous positive airway pressure, respiratory therapy assess nares and determine need for appliance change or resting period  Outcome: Progressing  Flowsheets (Taken 4/9/2025 2100)  Skin Integrity Remains Intact:   Monitor for areas of redness and/or skin breakdown   Assess vascular access sites hourly     Problem: Safety - Adult  Goal: Free from fall injury  Outcome: Progressing     Problem: Pain  Goal: Verbalizes/displays adequate comfort level or baseline comfort level  Outcome: Progressing

## 2025-04-10 NOTE — CARE COORDINATION
Case Management Assessment  Initial Evaluation    Date/Time of Evaluation: 4/10/2025 4:51 PM  Assessment Completed by: ISSAC LUNA    If patient is discharged prior to next notation, then this note serves as note for discharge by case management.    Patient Name: Rica Enriquez                   YOB: 1950  Diagnosis: Angioedema [T78.3XXA]  Angioedema, initial encounter [T78.3XXA]  Angioedema of lips, initial encounter [T78.3XXA]                   Date / Time: 4/9/2025  9:05 PM    Patient Admission Status: Inpatient   Readmission Risk (Low < 19, Mod (19-27), High > 27): Readmission Risk Score: 15.6    Current PCP: Huong Glasgow MD  PCP verified by CM? (P) Yes    Chart Reviewed: Yes      History Provided by: (P) Patient  Patient Orientation: (P) Alert and Oriented, Person, Place, Situation, Self    Patient Cognition: (P) Alert    Hospitalization in the last 30 days (Readmission):  No    If yes, Readmission Assessment in CM Navigator will be completed.    Advance Directives:      Code Status: Full Code   Patient's Primary Decision Maker is: (P) Legal Next of Kin    Primary Decision Maker: Denton Wallace - Child - [phone]    Discharge Planning:    Patient lives with: (P) Family Members Type of Home: (P) House  Primary Care Giver: (P) Self  Patient Support Systems include: (P) Family Members   Current Financial resources: (P) Medicare  Current community resources:    Current services prior to admission: (P) Durable Medical Equipment, C-pap            Current DME: (P) Walker            Type of Home Care services:  (P) None    ADLS  Prior functional level: (P) Independent in ADLs/IADLs  Current functional level: (P) Independent in ADLs/IADLs    PT AM-PAC:   /24  OT AM-PAC:   /24    Family can provide assistance at DC: (P) Yes  Would you like Case Management to discuss the discharge plan with any other family members/significant others, and if so, who? (P) Yes (Son)  Plans to Return

## 2025-04-10 NOTE — H&P
Critical Care - History and Physical Examination    Patient's name:  Rica Enriquez  Medical Record Number: [id number]  Patient's account/billing number: [id number]  Patient's YOB: 1950  Age: 74 y.o.  Date of Admission: 4/9/2025  9:05 PM  Date of History and Physical Examination: 4/9/2025    Primary Care Physician: Huong Glasgow MD  Attending Physician: Herman Anderson MD     Code Status: Full Code    Chief complaint: No chief complaint on file.      HISTORY OF PRESENT ILLNESS:   Rica Enriquez is a 74 y.o. female presented as a transfer from [facility] due to concern of angioedema, likely secondary to lisinopril.  Patient has a past medical history significant for COPD on 3 L of oxygen via nasal cannula, CKD, type II DM, HLD, HTN, hypothyroidism, and major depressive disorder.  Patient states that she takes lisinopril and has taken it for several years.  She states that she has never had issues with lisinopril causing any swelling in the past.  She states that she recently started Mounjaro after being taken off of her insulin due to issues with hypoglycemia.  She started Mounjaro about 2 months ago and has had 7 total injections.  She states that this is the only other new medication that she has been started on recently.    Patient states that she presented to [facility] on 4/9 AM due to concern for swelling of the left side of her face and left upper lip.  She states that it progressively got mildly worse at home, so her son made her come into the ER.  She states that she did not have any trouble breathing, voice changes, shortness of breath or wheezing associated with the symptoms.  At [facility], patient was given 1 unit of FFP, 10 mg of Decadron, 25 mg of Benadryl, 0.3 mg of epinephrine, and 20 mg of Pepcid.  There was reported improvement in her swelling, but patient was transferred to [facility] ICU for higher level of care and airway

## 2025-04-10 NOTE — TRANSITION OF CARE
Critical care team - Resident sign-out to medicine service      Date and time: 4/10/2025 6:56 PM  Patient's name:  Rica Enriquez  Medical Record Number: [id number]  Patient's account/billing number: [id number]  Patient's YOB: 1950  Age: 74 y.o.  Date of Admission: 4/9/2025  9:05 PM  Length of stay during current admission: 1    Primary Care Physician: Huong Glasgow MD    Code Status: Full Code    Mode of physician to physician communication:        [x] Via telephone   [] In person     Date and time of sign-out: 4/10/2025 6:56 PM    Accepting Internal Medicine resident: Chelsey Pina .    Accepting Medicine team: IM Team Jessica .    Accepting team's attending: Dr. Garcia    Patient's current ICU Bed:  3015     Patient's assigned bed on floor:  246        [x] Med-Surg Monitored [] Step-down       [] Psychiatry ICU       [] Psych floor     Reason for ICU admission:     Rica Enriquez is a 74 y.o. female presented as a transfer from [facility] due to concern of angioedema, likely secondary to lisinopril.  Patient has a past medical history significant for COPD on 3 L of oxygen via nasal cannula, CKD, type II DM, HLD, HTN, hypothyroidism, and major depressive disorder.  Patient states that she takes lisinopril and has taken it for several years.  She states that she has never had issues with lisinopril causing any swelling in the past.  She states that she recently started Mounjaro after being taken off of her insulin due to issues with hypoglycemia.  She started Mounjaro about 2 months ago and has had 7 total injections.  She states that this is the only other new medication that she has been started on recently.     Patient states that she presented to [facility] on 4/9 AM due to concern for swelling of the left side of her face and left upper lip.  She states that it progressively got mildly worse at home, so her son made her come into the ER.  She states that she did not have

## 2025-04-11 ENCOUNTER — APPOINTMENT (OUTPATIENT)
Dept: VASCULAR LAB | Age: 75
DRG: 915 | End: 2025-04-11
Attending: INTERNAL MEDICINE
Payer: MEDICARE

## 2025-04-11 ENCOUNTER — APPOINTMENT (OUTPATIENT)
Dept: CT IMAGING | Age: 75
DRG: 915 | End: 2025-04-11
Attending: INTERNAL MEDICINE
Payer: MEDICARE

## 2025-04-11 ENCOUNTER — APPOINTMENT (OUTPATIENT)
Dept: MRI IMAGING | Age: 75
DRG: 915 | End: 2025-04-11
Attending: INTERNAL MEDICINE
Payer: MEDICARE

## 2025-04-11 PROBLEM — R29.6 RECURRENT FALLS: Status: ACTIVE | Noted: 2025-04-11

## 2025-04-11 LAB
25(OH)D3 SERPL-MCNC: 13.2 NG/ML (ref 30–100)
ANION GAP SERPL CALCULATED.3IONS-SCNC: 16 MMOL/L (ref 9–16)
BASOPHILS # BLD: <0.03 K/UL (ref 0–0.2)
BASOPHILS NFR BLD: 0 % (ref 0–2)
BUN SERPL-MCNC: 28 MG/DL (ref 8–23)
CALCIUM SERPL-MCNC: 6.6 MG/DL (ref 8.6–10.4)
CHLORIDE SERPL-SCNC: 109 MMOL/L (ref 98–107)
CO2 SERPL-SCNC: 17 MMOL/L (ref 20–31)
CREAT SERPL-MCNC: 1.5 MG/DL (ref 0.6–0.9)
EOSINOPHIL # BLD: <0.03 K/UL (ref 0–0.44)
EOSINOPHILS RELATIVE PERCENT: 0 % (ref 1–4)
ERYTHROCYTE [DISTWIDTH] IN BLOOD BY AUTOMATED COUNT: 13.2 % (ref 11.8–14.4)
FOLATE SERPL-MCNC: 14.2 NG/ML (ref 4.8–24.2)
GFR, ESTIMATED: 37 ML/MIN/1.73M2
GLUCOSE BLD-MCNC: 125 MG/DL (ref 65–105)
GLUCOSE BLD-MCNC: 125 MG/DL (ref 65–105)
GLUCOSE BLD-MCNC: 85 MG/DL (ref 65–105)
GLUCOSE BLD-MCNC: 93 MG/DL (ref 65–105)
GLUCOSE SERPL-MCNC: 99 MG/DL (ref 74–99)
HCT VFR BLD AUTO: 33.8 % (ref 36.3–47.1)
HGB BLD-MCNC: 10.9 G/DL (ref 11.9–15.1)
IMM GRANULOCYTES # BLD AUTO: 0.03 K/UL (ref 0–0.3)
IMM GRANULOCYTES NFR BLD: 1 %
LYMPHOCYTES NFR BLD: 2.07 K/UL (ref 1.1–3.7)
LYMPHOCYTES RELATIVE PERCENT: 31 % (ref 24–43)
MCH RBC QN AUTO: 29.2 PG (ref 25.2–33.5)
MCHC RBC AUTO-ENTMCNC: 32.2 G/DL (ref 28.4–34.8)
MCV RBC AUTO: 90.6 FL (ref 82.6–102.9)
MONOCYTES NFR BLD: 0.37 K/UL (ref 0.1–1.2)
MONOCYTES NFR BLD: 6 % (ref 3–12)
NEUTROPHILS NFR BLD: 62 % (ref 36–65)
NEUTS SEG NFR BLD: 4.16 K/UL (ref 1.5–8.1)
NRBC BLD-RTO: 0 PER 100 WBC
PLATELET # BLD AUTO: 123 K/UL (ref 138–453)
PMV BLD AUTO: 10.9 FL (ref 8.1–13.5)
POTASSIUM SERPL-SCNC: 3.5 MMOL/L (ref 3.7–5.3)
RBC # BLD AUTO: 3.73 M/UL (ref 3.95–5.11)
SODIUM SERPL-SCNC: 142 MMOL/L (ref 136–145)
VIT B12 SERPL-MCNC: 219 PG/ML (ref 232–1245)
WBC OTHER # BLD: 6.7 K/UL (ref 3.5–11.3)

## 2025-04-11 PROCEDURE — 94660 CPAP INITIATION&MGMT: CPT

## 2025-04-11 PROCEDURE — 6370000000 HC RX 637 (ALT 250 FOR IP): Performed by: INTERNAL MEDICINE

## 2025-04-11 PROCEDURE — 85025 COMPLETE CBC W/AUTO DIFF WBC: CPT

## 2025-04-11 PROCEDURE — 6370000000 HC RX 637 (ALT 250 FOR IP): Performed by: STUDENT IN AN ORGANIZED HEALTH CARE EDUCATION/TRAINING PROGRAM

## 2025-04-11 PROCEDURE — 6370000000 HC RX 637 (ALT 250 FOR IP)

## 2025-04-11 PROCEDURE — 82746 ASSAY OF FOLIC ACID SERUM: CPT

## 2025-04-11 PROCEDURE — 36415 COLL VENOUS BLD VENIPUNCTURE: CPT

## 2025-04-11 PROCEDURE — 82306 VITAMIN D 25 HYDROXY: CPT

## 2025-04-11 PROCEDURE — 2580000003 HC RX 258

## 2025-04-11 PROCEDURE — 99222 1ST HOSP IP/OBS MODERATE 55: CPT

## 2025-04-11 PROCEDURE — 97530 THERAPEUTIC ACTIVITIES: CPT

## 2025-04-11 PROCEDURE — 80048 BASIC METABOLIC PNL TOTAL CA: CPT

## 2025-04-11 PROCEDURE — 97162 PT EVAL MOD COMPLEX 30 MIN: CPT

## 2025-04-11 PROCEDURE — 82947 ASSAY GLUCOSE BLOOD QUANT: CPT

## 2025-04-11 PROCEDURE — 2500000003 HC RX 250 WO HCPCS

## 2025-04-11 PROCEDURE — 70544 MR ANGIOGRAPHY HEAD W/O DYE: CPT

## 2025-04-11 PROCEDURE — 1200000000 HC SEMI PRIVATE

## 2025-04-11 PROCEDURE — 94640 AIRWAY INHALATION TREATMENT: CPT

## 2025-04-11 PROCEDURE — 2700000000 HC OXYGEN THERAPY PER DAY

## 2025-04-11 PROCEDURE — 6360000002 HC RX W HCPCS

## 2025-04-11 PROCEDURE — 97166 OT EVAL MOD COMPLEX 45 MIN: CPT

## 2025-04-11 PROCEDURE — 99223 1ST HOSP IP/OBS HIGH 75: CPT | Performed by: STUDENT IN AN ORGANIZED HEALTH CARE EDUCATION/TRAINING PROGRAM

## 2025-04-11 PROCEDURE — 82607 VITAMIN B-12: CPT

## 2025-04-11 PROCEDURE — 93880 EXTRACRANIAL BILAT STUDY: CPT

## 2025-04-11 PROCEDURE — 70450 CT HEAD/BRAIN W/O DYE: CPT

## 2025-04-11 PROCEDURE — 97535 SELF CARE MNGMENT TRAINING: CPT

## 2025-04-11 PROCEDURE — 5A09357 ASSISTANCE WITH RESPIRATORY VENTILATION, LESS THAN 24 CONSECUTIVE HOURS, CONTINUOUS POSITIVE AIRWAY PRESSURE: ICD-10-PCS | Performed by: INTERNAL MEDICINE

## 2025-04-11 PROCEDURE — 6370000000 HC RX 637 (ALT 250 FOR IP): Performed by: NURSE PRACTITIONER

## 2025-04-11 PROCEDURE — 70551 MRI BRAIN STEM W/O DYE: CPT

## 2025-04-11 RX ORDER — OXYCODONE HYDROCHLORIDE 5 MG/1
5 TABLET ORAL EVERY 4 HOURS PRN
Refills: 0 | Status: CANCELLED | OUTPATIENT
Start: 2025-04-11

## 2025-04-11 RX ORDER — CYANOCOBALAMIN 1000 UG/ML
1000 INJECTION, SOLUTION INTRAMUSCULAR; SUBCUTANEOUS ONCE
Status: DISCONTINUED | OUTPATIENT
Start: 2025-04-11 | End: 2025-04-16 | Stop reason: HOSPADM

## 2025-04-11 RX ORDER — ROPINIROLE 0.5 MG/1
0.5 TABLET, FILM COATED ORAL NIGHTLY
Status: DISCONTINUED | OUTPATIENT
Start: 2025-04-11 | End: 2025-04-13

## 2025-04-11 RX ORDER — UBIDECARENONE 75 MG
50 CAPSULE ORAL DAILY
Status: DISCONTINUED | OUTPATIENT
Start: 2025-04-11 | End: 2025-04-16 | Stop reason: HOSPADM

## 2025-04-11 RX ADMIN — BUDESONIDE AND FORMOTEROL FUMARATE DIHYDRATE 2 PUFF: 80; 4.5 AEROSOL RESPIRATORY (INHALATION) at 09:06

## 2025-04-11 RX ADMIN — BUPROPION HYDROCHLORIDE 100 MG: 100 TABLET, FILM COATED ORAL at 20:55

## 2025-04-11 RX ADMIN — POTASSIUM CHLORIDE 40 MEQ: 1500 TABLET, EXTENDED RELEASE ORAL at 20:52

## 2025-04-11 RX ADMIN — BUDESONIDE AND FORMOTEROL FUMARATE DIHYDRATE 2 PUFF: 80; 4.5 AEROSOL RESPIRATORY (INHALATION) at 22:37

## 2025-04-11 RX ADMIN — HEPARIN SODIUM 5000 UNITS: 5000 INJECTION INTRAVENOUS; SUBCUTANEOUS at 04:43

## 2025-04-11 RX ADMIN — FAMOTIDINE 20 MG: 10 INJECTION, SOLUTION INTRAVENOUS at 09:40

## 2025-04-11 RX ADMIN — ASPIRIN 81 MG: 81 TABLET, COATED ORAL at 09:39

## 2025-04-11 RX ADMIN — SODIUM CHLORIDE: 0.9 INJECTION, SOLUTION INTRAVENOUS at 20:56

## 2025-04-11 RX ADMIN — ROSUVASTATIN CALCIUM 20 MG: 20 TABLET, FILM COATED ORAL at 09:40

## 2025-04-11 RX ADMIN — SODIUM CHLORIDE, PRESERVATIVE FREE 10 ML: 5 INJECTION INTRAVENOUS at 20:53

## 2025-04-11 RX ADMIN — ESCITALOPRAM OXALATE 10 MG: 10 TABLET ORAL at 09:40

## 2025-04-11 RX ADMIN — BUPROPION HYDROCHLORIDE 100 MG: 100 TABLET, FILM COATED ORAL at 09:39

## 2025-04-11 RX ADMIN — ROPINIROLE HYDROCHLORIDE 0.5 MG: 0.5 TABLET, FILM COATED ORAL at 20:52

## 2025-04-11 RX ADMIN — METOPROLOL SUCCINATE 150 MG: 25 TABLET, EXTENDED RELEASE ORAL at 20:52

## 2025-04-11 RX ADMIN — SODIUM CHLORIDE: 0.9 INJECTION, SOLUTION INTRAVENOUS at 09:29

## 2025-04-11 RX ADMIN — LEVOTHYROXINE SODIUM 75 MCG: 75 TABLET ORAL at 09:40

## 2025-04-11 RX ADMIN — HEPARIN SODIUM 5000 UNITS: 5000 INJECTION INTRAVENOUS; SUBCUTANEOUS at 20:51

## 2025-04-11 RX ADMIN — VITAM B12 50 MCG: 100 TAB at 16:58

## 2025-04-11 RX ADMIN — CALCIUM CARBONATE-CHOLECALCIFEROL TAB 250 MG-125 UNIT 1 TABLET: 250-125 TAB at 16:58

## 2025-04-11 RX ADMIN — HEPARIN SODIUM 5000 UNITS: 5000 INJECTION INTRAVENOUS; SUBCUTANEOUS at 16:58

## 2025-04-11 ASSESSMENT — PAIN SCALES - GENERAL
PAINLEVEL_OUTOF10: 0
PAINLEVEL_OUTOF10: 0

## 2025-04-11 NOTE — PLAN OF CARE
Problem: Chronic Conditions and Co-morbidities  Goal: Patient's chronic conditions and co-morbidity symptoms are monitored and maintained or improved  Outcome: Progressing     Problem: Discharge Planning  Goal: Discharge to home or other facility with appropriate resources  Outcome: Progressing     Problem: Skin/Tissue Integrity  Goal: Skin integrity remains intact  Description: 1.  Monitor for areas of redness and/or skin breakdown  2.  Assess vascular access sites hourly  3.  Every 4-6 hours minimum:  Change oxygen saturation probe site  4.  Every 4-6 hours:  If on nasal continuous positive airway pressure, respiratory therapy assess nares and determine need for appliance change or resting period  Outcome: Progressing     Problem: Safety - Adult  Goal: Free from fall injury  Outcome: Progressing     Problem: Pain  Goal: Verbalizes/displays adequate comfort level or baseline comfort level  Outcome: Progressing     Problem: Respiratory - Adult  Goal: Achieves optimal ventilation and oxygenation  Outcome: Progressing  Flowsheets (Taken 4/11/2025 1507 by Mela Collins, KASH)  Achieves optimal ventilation and oxygenation:   Assess for changes in respiratory status   Assess for changes in mentation and behavior   Position to facilitate oxygenation and minimize respiratory effort   Oxygen supplementation based on oxygen saturation or arterial blood gases   Encourage broncho-pulmonary hygiene including cough, deep breathe, incentive spirometry   Assess the need for suctioning and aspirate as needed   Assess and instruct to report shortness of breath or any respiratory difficulty   Respiratory therapy support as indicated     Problem: Depression/Self Harm  Goal: Effect of psychiatric condition will be minimized and patient will be protected from self harm  Description: INTERVENTIONS:  1. Assess impact of patient's symptoms on level of functioning, self care needs and offer support as indicated  2. Assess patient/family

## 2025-04-11 NOTE — PLAN OF CARE
Problem: Chronic Conditions and Co-morbidities  Goal: Patient's chronic conditions and co-morbidity symptoms are monitored and maintained or improved  Outcome: Progressing     Problem: Discharge Planning  Goal: Discharge to home or other facility with appropriate resources  Outcome: Progressing     Problem: Skin/Tissue Integrity  Goal: Skin integrity remains intact  Description: 1.  Monitor for areas of redness and/or skin breakdown  2.  Assess vascular access sites hourly  3.  Every 4-6 hours minimum:  Change oxygen saturation probe site  4.  Every 4-6 hours:  If on nasal continuous positive airway pressure, respiratory therapy assess nares and determine need for appliance change or resting period  Outcome: Progressing     Problem: Safety - Adult  Goal: Free from fall injury  Outcome: Progressing     Problem: Pain  Goal: Verbalizes/displays adequate comfort level or baseline comfort level  Outcome: Progressing     Problem: Self Harm/Suicidality  Goal: Will have no self-injury during hospital stay  Description: INTERVENTIONS:  1.  Ensure constant observer at bedside with Q15M safety checks  2.  Maintain a safe environment  3.  Secure patient belongings  4.  Ensure family/visitors adhere to safety recommendations  5.  Ensure safety tray has been added to patient's diet order  6.  Every shift and PRN: Re-assess suicidal risk via Frequent Screener    Outcome: Progressing

## 2025-04-11 NOTE — PLAN OF CARE
Rica Enriquez was evaluated today and a DME order was entered for a wheeled walker because she requires this to successfully complete daily living tasks of bathing, personal cares, ambulating, and meal preparation.  A wheeled walker is necessary due to the patient's unsteady gait, upper body weakness, and inability to  an ambulation device; and she can ambulate only by pushing a walker instead of a lesser assistive device such as a cane, crutch, or standard walker.  The need for this equipment was discussed with the patient and she understands and is in agreement.       Electronically signed by Sincere Garcia MD on 4/11/2025 at 3:32 PM

## 2025-04-11 NOTE — PLAN OF CARE
Problem: Chronic Conditions and Co-morbidities  Goal: Patient's chronic conditions and co-morbidity symptoms are monitored and maintained or improved  4/11/2025 1935 by Dmitriy Chan RN  Outcome: Progressing  4/11/2025 1605 by Sarah Chawla RN  Outcome: Progressing     Problem: Discharge Planning  Goal: Discharge to home or other facility with appropriate resources  4/11/2025 1935 by Dmitriy Chan RN  Outcome: Progressing  4/11/2025 1605 by Sarah Chawla RN  Outcome: Progressing     Problem: Skin/Tissue Integrity  Goal: Skin integrity remains intact  Description: 1.  Monitor for areas of redness and/or skin breakdown  2.  Assess vascular access sites hourly  3.  Every 4-6 hours minimum:  Change oxygen saturation probe site  4.  Every 4-6 hours:  If on nasal continuous positive airway pressure, respiratory therapy assess nares and determine need for appliance change or resting period  4/11/2025 1935 by Dmitriy Chan RN  Outcome: Progressing  4/11/2025 1605 by Sarah Chawla RN  Outcome: Progressing     Problem: Safety - Adult  Goal: Free from fall injury  4/11/2025 1935 by Dmitriy Chan RN  Outcome: Progressing  4/11/2025 1605 by Sarah Chawla RN  Outcome: Progressing     Problem: Pain  Goal: Verbalizes/displays adequate comfort level or baseline comfort level  4/11/2025 1935 by Dmitriy Chan RN  Outcome: Progressing  4/11/2025 1605 by Sarah Chawla RN  Outcome: Progressing     Problem: Respiratory - Adult  Goal: Achieves optimal ventilation and oxygenation  4/11/2025 1935 by Dmitriy Chan RN  Outcome: Progressing  4/11/2025 1605 by Sarah Chawla RN  Outcome: Progressing  Flowsheets (Taken 4/11/2025 1507 by Mela Collins, JORGE)  Achieves optimal ventilation and oxygenation:   Assess for changes in respiratory status   Assess for changes in mentation and behavior   Position to facilitate oxygenation and minimize respiratory effort   Oxygen supplementation based on oxygen saturation or arterial

## 2025-04-11 NOTE — H&P
[facility]  Office: [phone]  Demond Cifuentes DO, Emanuel Chau DO, Alec Chris DO, Brian Lowry DO, Mario Ramos MD, Hillary Singh MD, Philippe Quinonez MD, Meghan Draper MD,  Ellis Liang MD, Aaron Sotelo MD, Krysta Virgen MD,  Shanell Levy DO, Jacinto Adams MD, Sanchez Franklin MD, Chase Cifuentes DO, Naomi Arteaga MD,  Everette Bunch DO, Martha Valerio MD, Patricia Sood MD, Spring Yates MD,  Fili Thacker MD, Amie Santos MD, Kaylie Bullock MD, Ceci Lawson MD, Sincere Garcia MD, Gentry Saha MD, Lamonte Saxena DO, Miranda Rosario MD, Zeus Workman MD, Shanell Steward MD, Mohsin Reza, MD, Fina Gill MD, Shirley Waterhouse, CNP,  Marnie Fritz, CNP, Lamonte Moncada, CNP,  Randa Macias, DNP, Joanna Garcia, CNP, rCistine Valerio, CNP, Cristina Fields, CNP, Zaida Wall, CNP, Tanesha Barnett, PA-C, Chelsey Cline, CNP, Mary Beth Duran, CNP,  Romina Lr, CNP, Alexa Clement, CNP, Norberto Fisher, PA-C, Kia Hernandez, CNP,  Yuly Trujillo, CNS, Radha Riley, CNP, Zee Middleton, CNP,   Ciera Huff, CNP         [facility]   IN-PATIENT SERVICE   [facility] [place]    HISTORY AND PHYSICAL EXAMINATION            Date:   4/11/2025  Patient name:  Rica Enriquez  Date of admission:  4/9/2025  9:05 PM  MRN:   [id number]  Account:  [id number]  YOB: 1950  PCP:    Huong Glasgow MD  Room:   [address] [place] [address]  Code Status:    Full Code    Chief Complaint:     No chief complaint on file.  Swelling of lips    History Obtained From:     patient, electronic medical record    History of Present Illness:     Rica Enriquez is a 74 y.o. Non- / non  female with a history of COPD, CKD, diabetes mellitus, hyperlipidemia, hypertension, depression, and thyroid disease who presents as ICU transfer for angioedema and is admitted to the hospital for the management of Angioedema, initial encounter.    Patient who originally presented

## 2025-04-12 ENCOUNTER — APPOINTMENT (OUTPATIENT)
Dept: CT IMAGING | Age: 75
DRG: 915 | End: 2025-04-12
Attending: INTERNAL MEDICINE
Payer: MEDICARE

## 2025-04-12 ENCOUNTER — APPOINTMENT (OUTPATIENT)
Dept: GENERAL RADIOLOGY | Age: 75
DRG: 915 | End: 2025-04-12
Attending: INTERNAL MEDICINE
Payer: MEDICARE

## 2025-04-12 ENCOUNTER — APPOINTMENT (OUTPATIENT)
Dept: MRI IMAGING | Age: 75
DRG: 915 | End: 2025-04-12
Attending: INTERNAL MEDICINE
Payer: MEDICARE

## 2025-04-12 PROBLEM — S06.5XAA SUBDURAL HEMATOMA (HCC): Status: ACTIVE | Noted: 2025-04-12

## 2025-04-12 PROBLEM — G47.33 OSA (OBSTRUCTIVE SLEEP APNEA): Status: ACTIVE | Noted: 2025-04-12

## 2025-04-12 PROBLEM — E53.8 B12 DEFICIENCY: Status: ACTIVE | Noted: 2025-04-12

## 2025-04-12 PROBLEM — S06.5X0A TRAUMATIC SUBDURAL HEMORRHAGE WITHOUT LOSS OF CONSCIOUSNESS: Status: ACTIVE | Noted: 2025-04-12

## 2025-04-12 PROBLEM — E11.9 DIABETES MELLITUS TYPE 2, NONINSULIN DEPENDENT (HCC): Status: ACTIVE | Noted: 2021-10-16

## 2025-04-12 LAB
ABO + RH BLD: NORMAL
ANION GAP SERPL CALCULATED.3IONS-SCNC: 12 MMOL/L (ref 9–16)
ARM BAND NUMBER: NORMAL
BASOPHILS # BLD: 0.03 K/UL (ref 0–0.2)
BASOPHILS NFR BLD: 1 % (ref 0–2)
BILIRUB UR QL STRIP: NEGATIVE
BLOOD BANK SAMPLE EXPIRATION: NORMAL
BLOOD GROUP ANTIBODIES SERPL: NEGATIVE
BUN SERPL-MCNC: 21 MG/DL (ref 8–23)
CALCIUM SERPL-MCNC: 6.9 MG/DL (ref 8.6–10.4)
CHLORIDE SERPL-SCNC: 110 MMOL/L (ref 98–107)
CLARITY UR: CLEAR
CO2 SERPL-SCNC: 22 MMOL/L (ref 20–31)
COLOR UR: YELLOW
CREAT SERPL-MCNC: 1.4 MG/DL (ref 0.6–0.9)
ECHO BSA: 2.04 M2
EKG ATRIAL RATE: 76 BPM
EKG P AXIS: 48 DEGREES
EKG P-R INTERVAL: 176 MS
EKG Q-T INTERVAL: 400 MS
EKG QRS DURATION: 88 MS
EKG QTC CALCULATION (BAZETT): 450 MS
EKG R AXIS: 11 DEGREES
EKG T AXIS: 58 DEGREES
EKG VENTRICULAR RATE: 76 BPM
EOSINOPHIL # BLD: 0.13 K/UL (ref 0–0.44)
EOSINOPHILS RELATIVE PERCENT: 3 % (ref 1–4)
EPI CELLS #/AREA URNS HPF: NORMAL /HPF (ref 0–5)
ERYTHROCYTE [DISTWIDTH] IN BLOOD BY AUTOMATED COUNT: 13.5 % (ref 11.8–14.4)
EST. AVERAGE GLUCOSE BLD GHB EST-MCNC: 114 MG/DL
GFR, ESTIMATED: 40 ML/MIN/1.73M2
GLUCOSE BLD-MCNC: 169 MG/DL (ref 65–105)
GLUCOSE BLD-MCNC: 88 MG/DL (ref 65–105)
GLUCOSE BLD-MCNC: 91 MG/DL (ref 65–105)
GLUCOSE BLD-MCNC: 99 MG/DL (ref 65–105)
GLUCOSE SERPL-MCNC: 93 MG/DL (ref 74–99)
GLUCOSE UR STRIP-MCNC: NEGATIVE MG/DL
HBA1C MFR BLD: 5.6 % (ref 4–6)
HCT VFR BLD AUTO: 34.4 % (ref 36.3–47.1)
HGB BLD-MCNC: 11.1 G/DL (ref 11.9–15.1)
HGB UR QL STRIP.AUTO: NEGATIVE
IMM GRANULOCYTES # BLD AUTO: 0.03 K/UL (ref 0–0.3)
IMM GRANULOCYTES NFR BLD: 1 %
INR PPP: 1.1
KETONES UR STRIP-MCNC: NEGATIVE MG/DL
LEUKOCYTE ESTERASE UR QL STRIP: ABNORMAL
LYMPHOCYTES NFR BLD: 2.24 K/UL (ref 1.1–3.7)
LYMPHOCYTES RELATIVE PERCENT: 48 % (ref 24–43)
MCH RBC QN AUTO: 29.4 PG (ref 25.2–33.5)
MCHC RBC AUTO-ENTMCNC: 32.3 G/DL (ref 28.4–34.8)
MCV RBC AUTO: 91.2 FL (ref 82.6–102.9)
MONOCYTES NFR BLD: 0.32 K/UL (ref 0.1–1.2)
MONOCYTES NFR BLD: 7 % (ref 3–12)
NEUTROPHILS NFR BLD: 41 % (ref 36–65)
NEUTS SEG NFR BLD: 1.93 K/UL (ref 1.5–8.1)
NITRITE UR QL STRIP: NEGATIVE
NRBC BLD-RTO: 0 PER 100 WBC
PH UR STRIP: 5 [PH] (ref 5–8)
PLATELET # BLD AUTO: ABNORMAL K/UL (ref 138–453)
PLATELET, FLUORESCENCE: 117 K/UL (ref 138–453)
PLATELETS.RETICULATED NFR BLD AUTO: 2.7 % (ref 1.1–10.3)
POTASSIUM SERPL-SCNC: 3.8 MMOL/L (ref 3.7–5.3)
PROT UR STRIP-MCNC: ABNORMAL MG/DL
PROTHROMBIN TIME: 14.7 SEC (ref 11.7–14.9)
RBC # BLD AUTO: 3.77 M/UL (ref 3.95–5.11)
RBC #/AREA URNS HPF: NORMAL /HPF (ref 0–2)
SODIUM SERPL-SCNC: 144 MMOL/L (ref 136–145)
SP GR UR STRIP: 1.01 (ref 1–1.03)
UROBILINOGEN UR STRIP-ACNC: NORMAL EU/DL (ref 0–1)
VAS LEFT CCA DIST EDV: 13.2 CM/S
VAS LEFT CCA DIST PSV: 74.6 CM/S
VAS LEFT CCA MID EDV: 13.7 CM/S
VAS LEFT CCA MID PSV: 72.4 CM/S
VAS LEFT CCA PROX EDV: 18.1 CM/S
VAS LEFT CCA PROX PSV: 94.9 CM/S
VAS LEFT ECA EDV: 14.9 CM/S
VAS LEFT ECA PSV: 103 CM/S
VAS LEFT ICA DIST EDV: 26.7 CM/S
VAS LEFT ICA DIST PSV: 93.8 CM/S
VAS LEFT ICA MID EDV: 17.6 CM/S
VAS LEFT ICA MID PSV: 56.4 CM/S
VAS LEFT ICA PROX EDV: 14 CM/S
VAS LEFT ICA PROX PSV: 49.4 CM/S
VAS LEFT ICA/CCA PSV: 1.26
VAS LEFT VERTEBRAL EDV: 19.3 CM/S
VAS LEFT VERTEBRAL PSV: 60.9 CM/S
VAS RIGHT CCA DIST EDV: 16.1 CM/S
VAS RIGHT CCA DIST PSV: 69.4 CM/S
VAS RIGHT CCA MID EDV: 15.7 CM/S
VAS RIGHT CCA MID PSV: 71.7 CM/S
VAS RIGHT CCA PROX EDV: 15.5 CM/S
VAS RIGHT CCA PROX PSV: 88.8 CM/S
VAS RIGHT ECA EDV: 13 CM/S
VAS RIGHT ECA PSV: 90.1 CM/S
VAS RIGHT ICA DIST EDV: 18 CM/S
VAS RIGHT ICA DIST PSV: 58.8 CM/S
VAS RIGHT ICA MID EDV: 17.6 CM/S
VAS RIGHT ICA MID PSV: 75.7 CM/S
VAS RIGHT ICA PROX EDV: 14.5 CM/S
VAS RIGHT ICA PROX PSV: 70.6 CM/S
VAS RIGHT ICA/CCA PSV: 1.09
VAS RIGHT VERTEBRAL EDV: 5.52 CM/S
VAS RIGHT VERTEBRAL PSV: 32.5 CM/S
WBC #/AREA URNS HPF: NORMAL /HPF (ref 0–5)
WBC OTHER # BLD: 4.7 K/UL (ref 3.5–11.3)

## 2025-04-12 PROCEDURE — 72141 MRI NECK SPINE W/O DYE: CPT

## 2025-04-12 PROCEDURE — 72125 CT NECK SPINE W/O DYE: CPT

## 2025-04-12 PROCEDURE — 86850 RBC ANTIBODY SCREEN: CPT

## 2025-04-12 PROCEDURE — 86901 BLOOD TYPING SEROLOGIC RH(D): CPT

## 2025-04-12 PROCEDURE — 94640 AIRWAY INHALATION TREATMENT: CPT

## 2025-04-12 PROCEDURE — 2500000003 HC RX 250 WO HCPCS

## 2025-04-12 PROCEDURE — 70450 CT HEAD/BRAIN W/O DYE: CPT

## 2025-04-12 PROCEDURE — 6370000000 HC RX 637 (ALT 250 FOR IP)

## 2025-04-12 PROCEDURE — 71045 X-RAY EXAM CHEST 1 VIEW: CPT

## 2025-04-12 PROCEDURE — 93005 ELECTROCARDIOGRAM TRACING: CPT | Performed by: REGISTERED NURSE

## 2025-04-12 PROCEDURE — 93880 EXTRACRANIAL BILAT STUDY: CPT | Performed by: SURGERY

## 2025-04-12 PROCEDURE — 6370000000 HC RX 637 (ALT 250 FOR IP): Performed by: STUDENT IN AN ORGANIZED HEALTH CARE EDUCATION/TRAINING PROGRAM

## 2025-04-12 PROCEDURE — 80048 BASIC METABOLIC PNL TOTAL CA: CPT

## 2025-04-12 PROCEDURE — 1200000000 HC SEMI PRIVATE

## 2025-04-12 PROCEDURE — 99222 1ST HOSP IP/OBS MODERATE 55: CPT | Performed by: NEUROLOGICAL SURGERY

## 2025-04-12 PROCEDURE — 81001 URINALYSIS AUTO W/SCOPE: CPT

## 2025-04-12 PROCEDURE — 2580000003 HC RX 258

## 2025-04-12 PROCEDURE — 99232 SBSQ HOSP IP/OBS MODERATE 35: CPT | Performed by: STUDENT IN AN ORGANIZED HEALTH CARE EDUCATION/TRAINING PROGRAM

## 2025-04-12 PROCEDURE — 95819 EEG AWAKE AND ASLEEP: CPT | Performed by: PSYCHIATRY & NEUROLOGY

## 2025-04-12 PROCEDURE — 6370000000 HC RX 637 (ALT 250 FOR IP): Performed by: NURSE PRACTITIONER

## 2025-04-12 PROCEDURE — 86900 BLOOD TYPING SEROLOGIC ABO: CPT

## 2025-04-12 PROCEDURE — 95816 EEG AWAKE AND DROWSY: CPT

## 2025-04-12 PROCEDURE — 85025 COMPLETE CBC W/AUTO DIFF WBC: CPT

## 2025-04-12 PROCEDURE — 93010 ELECTROCARDIOGRAM REPORT: CPT | Performed by: INTERNAL MEDICINE

## 2025-04-12 PROCEDURE — 82947 ASSAY GLUCOSE BLOOD QUANT: CPT

## 2025-04-12 PROCEDURE — 85610 PROTHROMBIN TIME: CPT

## 2025-04-12 PROCEDURE — 36415 COLL VENOUS BLD VENIPUNCTURE: CPT

## 2025-04-12 PROCEDURE — 6360000002 HC RX W HCPCS

## 2025-04-12 PROCEDURE — 83036 HEMOGLOBIN GLYCOSYLATED A1C: CPT

## 2025-04-12 PROCEDURE — 4A10X4Z MONITORING OF CENTRAL NERVOUS ELECTRICAL ACTIVITY, EXTERNAL APPROACH: ICD-10-PCS | Performed by: PSYCHIATRY & NEUROLOGY

## 2025-04-12 PROCEDURE — 85055 RETICULATED PLATELET ASSAY: CPT

## 2025-04-12 PROCEDURE — 99232 SBSQ HOSP IP/OBS MODERATE 35: CPT | Performed by: INTERNAL MEDICINE

## 2025-04-12 PROCEDURE — 72146 MRI CHEST SPINE W/O DYE: CPT

## 2025-04-12 RX ADMIN — SODIUM CHLORIDE, PRESERVATIVE FREE 10 ML: 5 INJECTION INTRAVENOUS at 21:07

## 2025-04-12 RX ADMIN — ESCITALOPRAM OXALATE 10 MG: 10 TABLET ORAL at 09:26

## 2025-04-12 RX ADMIN — BUDESONIDE AND FORMOTEROL FUMARATE DIHYDRATE 2 PUFF: 80; 4.5 AEROSOL RESPIRATORY (INHALATION) at 08:08

## 2025-04-12 RX ADMIN — CALCIUM CARBONATE-CHOLECALCIFEROL TAB 250 MG-125 UNIT 1 TABLET: 250-125 TAB at 09:26

## 2025-04-12 RX ADMIN — VITAM B12 50 MCG: 100 TAB at 09:27

## 2025-04-12 RX ADMIN — BUPROPION HYDROCHLORIDE 100 MG: 100 TABLET, FILM COATED ORAL at 09:27

## 2025-04-12 RX ADMIN — BUDESONIDE AND FORMOTEROL FUMARATE DIHYDRATE 2 PUFF: 80; 4.5 AEROSOL RESPIRATORY (INHALATION) at 22:15

## 2025-04-12 RX ADMIN — FAMOTIDINE 20 MG: 10 INJECTION, SOLUTION INTRAVENOUS at 09:26

## 2025-04-12 RX ADMIN — LEVOTHYROXINE SODIUM 75 MCG: 75 TABLET ORAL at 09:26

## 2025-04-12 RX ADMIN — ACETAMINOPHEN 650 MG: 325 TABLET ORAL at 16:36

## 2025-04-12 RX ADMIN — ROPINIROLE HYDROCHLORIDE 0.5 MG: 0.5 TABLET, FILM COATED ORAL at 21:04

## 2025-04-12 RX ADMIN — ROSUVASTATIN CALCIUM 20 MG: 20 TABLET, FILM COATED ORAL at 09:26

## 2025-04-12 ASSESSMENT — PAIN SCALES - GENERAL
PAINLEVEL_OUTOF10: 2
PAINLEVEL_OUTOF10: 4

## 2025-04-12 ASSESSMENT — PAIN DESCRIPTION - DESCRIPTORS: DESCRIPTORS: ACHING

## 2025-04-12 ASSESSMENT — PAIN DESCRIPTION - LOCATION: LOCATION: HEAD

## 2025-04-12 NOTE — PROCEDURES
PROCEDURE NOTE  Date: 4/12/2025   Name: Rica Enriquez  YOB: 1950    Procedures            Date: 4/12/2025  Referring physician: Dr. De Luna    Indication  Patient aged 74 y with encephalopathy. EEG done to assess for epileptiform activity.    Introduction  This routine 20-minute EEG was recorded using the [employer] 10-20 System on a viseto workstation at 256 samples/s. Automated spike and seizure detection algorithms were applied.    Description  During the maximal alert state, a well-regulated, symmetric, and reactive 8-9 Hz posterior dominant rhythm was seen. No consistent focal slowing or interhemispheric asymmetry was noted. Stage I and stage II sleep were observed. There were no interictal epileptiform discharges or electrographic seizures.    Activations  Hyperventilation was not performed. Intermittent photic stimulation was performed and demonstrated no posterior driving response.    Impression  Normal awake and sleep EEG.    No epileptiform discharges were identified. Please note the absence of such activity on this record cannot conclusively rule out an epileptic disorder. If such is still clinically suspected, a repeat study with sleep deprivation and/or prolonged sampling may be helpful.    Shanell Zarco MD  Epilepsy Board Certified.  Neurology Board Certified.    Electronically Signed

## 2025-04-12 NOTE — PLAN OF CARE
Problem: Chronic Conditions and Co-morbidities  Goal: Patient's chronic conditions and co-morbidity symptoms are monitored and maintained or improved  Outcome: Progressing     Problem: Discharge Planning  Goal: Discharge to home or other facility with appropriate resources  Outcome: Progressing     Problem: Skin/Tissue Integrity  Goal: Skin integrity remains intact  Description: 1.  Monitor for areas of redness and/or skin breakdown  2.  Assess vascular access sites hourly  3.  Every 4-6 hours minimum:  Change oxygen saturation probe site  4.  Every 4-6 hours:  If on nasal continuous positive airway pressure, respiratory therapy assess nares and determine need for appliance change or resting period  Outcome: Progressing     Problem: Safety - Adult  Goal: Free from fall injury  Outcome: Progressing     Problem: Pain  Goal: Verbalizes/displays adequate comfort level or baseline comfort level  Outcome: Progressing     Problem: Respiratory - Adult  Goal: Achieves optimal ventilation and oxygenation  Outcome: Progressing  Flowsheets (Taken 4/12/2025 0808 by Mela Collins, KASH)  Achieves optimal ventilation and oxygenation:   Assess for changes in respiratory status   Assess for changes in mentation and behavior   Position to facilitate oxygenation and minimize respiratory effort   Oxygen supplementation based on oxygen saturation or arterial blood gases   Encourage broncho-pulmonary hygiene including cough, deep breathe, incentive spirometry   Assess the need for suctioning and aspirate as needed   Assess and instruct to report shortness of breath or any respiratory difficulty   Respiratory therapy support as indicated     Problem: Self Harm/Suicidality  Goal: Will have no self-injury during hospital stay  Description: INTERVENTIONS:  1.  Ensure constant observer at bedside with Q15M safety checks  2.  Maintain a safe environment  3.  Secure patient belongings  4.  Ensure family/visitors adhere to safety

## 2025-04-13 PROBLEM — G93.41 ACUTE METABOLIC ENCEPHALOPATHY: Status: ACTIVE | Noted: 2025-04-13

## 2025-04-13 PROBLEM — S06.5XAA SDH (SUBDURAL HEMATOMA) (HCC): Status: ACTIVE | Noted: 2025-04-13

## 2025-04-13 LAB
ANION GAP SERPL CALCULATED.3IONS-SCNC: 9 MMOL/L (ref 9–16)
BASOPHILS # BLD: <0.03 K/UL (ref 0–0.2)
BASOPHILS NFR BLD: 0 % (ref 0–2)
BUN SERPL-MCNC: 15 MG/DL (ref 8–23)
CALCIUM SERPL-MCNC: 7.7 MG/DL (ref 8.6–10.4)
CHLORIDE SERPL-SCNC: 107 MMOL/L (ref 98–107)
CO2 SERPL-SCNC: 25 MMOL/L (ref 20–31)
CREAT SERPL-MCNC: 1.1 MG/DL (ref 0.6–0.9)
EOSINOPHIL # BLD: 0.07 K/UL (ref 0–0.44)
EOSINOPHILS RELATIVE PERCENT: 2 % (ref 1–4)
ERYTHROCYTE [DISTWIDTH] IN BLOOD BY AUTOMATED COUNT: 13.2 % (ref 11.8–14.4)
GFR, ESTIMATED: 53 ML/MIN/1.73M2
GLUCOSE BLD-MCNC: 101 MG/DL (ref 65–105)
GLUCOSE BLD-MCNC: 111 MG/DL (ref 65–105)
GLUCOSE BLD-MCNC: 123 MG/DL (ref 65–105)
GLUCOSE BLD-MCNC: 92 MG/DL (ref 65–105)
GLUCOSE SERPL-MCNC: 136 MG/DL (ref 74–99)
HCT VFR BLD AUTO: 35.1 % (ref 36.3–47.1)
HGB BLD-MCNC: 11.5 G/DL (ref 11.9–15.1)
IMM GRANULOCYTES # BLD AUTO: 0.03 K/UL (ref 0–0.3)
IMM GRANULOCYTES NFR BLD: 1 %
LYMPHOCYTES NFR BLD: 1.34 K/UL (ref 1.1–3.7)
LYMPHOCYTES RELATIVE PERCENT: 29 % (ref 24–43)
MCH RBC QN AUTO: 29.2 PG (ref 25.2–33.5)
MCHC RBC AUTO-ENTMCNC: 32.8 G/DL (ref 28.4–34.8)
MCV RBC AUTO: 89.1 FL (ref 82.6–102.9)
MONOCYTES NFR BLD: 0.31 K/UL (ref 0.1–1.2)
MONOCYTES NFR BLD: 7 % (ref 3–12)
NEUTROPHILS NFR BLD: 61 % (ref 36–65)
NEUTS SEG NFR BLD: 2.81 K/UL (ref 1.5–8.1)
NRBC BLD-RTO: 0 PER 100 WBC
PLATELET # BLD AUTO: 107 K/UL (ref 138–453)
PMV BLD AUTO: 12 FL (ref 8.1–13.5)
POTASSIUM SERPL-SCNC: 3.7 MMOL/L (ref 3.7–5.3)
RBC # BLD AUTO: 3.94 M/UL (ref 3.95–5.11)
SODIUM SERPL-SCNC: 141 MMOL/L (ref 136–145)
WBC OTHER # BLD: 4.6 K/UL (ref 3.5–11.3)

## 2025-04-13 PROCEDURE — 97110 THERAPEUTIC EXERCISES: CPT

## 2025-04-13 PROCEDURE — 6360000002 HC RX W HCPCS

## 2025-04-13 PROCEDURE — 80048 BASIC METABOLIC PNL TOTAL CA: CPT

## 2025-04-13 PROCEDURE — 2700000000 HC OXYGEN THERAPY PER DAY

## 2025-04-13 PROCEDURE — 6370000000 HC RX 637 (ALT 250 FOR IP): Performed by: INTERNAL MEDICINE

## 2025-04-13 PROCEDURE — 1200000000 HC SEMI PRIVATE

## 2025-04-13 PROCEDURE — 36415 COLL VENOUS BLD VENIPUNCTURE: CPT

## 2025-04-13 PROCEDURE — 97535 SELF CARE MNGMENT TRAINING: CPT

## 2025-04-13 PROCEDURE — 99232 SBSQ HOSP IP/OBS MODERATE 35: CPT | Performed by: STUDENT IN AN ORGANIZED HEALTH CARE EDUCATION/TRAINING PROGRAM

## 2025-04-13 PROCEDURE — 6370000000 HC RX 637 (ALT 250 FOR IP): Performed by: NURSE PRACTITIONER

## 2025-04-13 PROCEDURE — 97530 THERAPEUTIC ACTIVITIES: CPT

## 2025-04-13 PROCEDURE — 2500000003 HC RX 250 WO HCPCS

## 2025-04-13 PROCEDURE — 94761 N-INVAS EAR/PLS OXIMETRY MLT: CPT

## 2025-04-13 PROCEDURE — 2580000003 HC RX 258

## 2025-04-13 PROCEDURE — 99232 SBSQ HOSP IP/OBS MODERATE 35: CPT | Performed by: INTERNAL MEDICINE

## 2025-04-13 PROCEDURE — APPSS30 APP SPLIT SHARED TIME 16-30 MINUTES: Performed by: NURSE PRACTITIONER

## 2025-04-13 PROCEDURE — 85025 COMPLETE CBC W/AUTO DIFF WBC: CPT

## 2025-04-13 PROCEDURE — 97116 GAIT TRAINING THERAPY: CPT

## 2025-04-13 PROCEDURE — 6370000000 HC RX 637 (ALT 250 FOR IP): Performed by: STUDENT IN AN ORGANIZED HEALTH CARE EDUCATION/TRAINING PROGRAM

## 2025-04-13 PROCEDURE — 94660 CPAP INITIATION&MGMT: CPT

## 2025-04-13 PROCEDURE — 82947 ASSAY GLUCOSE BLOOD QUANT: CPT

## 2025-04-13 PROCEDURE — 6370000000 HC RX 637 (ALT 250 FOR IP)

## 2025-04-13 PROCEDURE — 94640 AIRWAY INHALATION TREATMENT: CPT

## 2025-04-13 RX ORDER — CEPHALEXIN 500 MG/1
500 CAPSULE ORAL EVERY 6 HOURS SCHEDULED
Status: DISCONTINUED | OUTPATIENT
Start: 2025-04-13 | End: 2025-04-14

## 2025-04-13 RX ORDER — BUTALBITAL, ACETAMINOPHEN AND CAFFEINE 50; 325; 40 MG/1; MG/1; MG/1
1 TABLET ORAL ONCE
Status: COMPLETED | OUTPATIENT
Start: 2025-04-13 | End: 2025-04-13

## 2025-04-13 RX ORDER — OXYCODONE HYDROCHLORIDE 5 MG/1
5 TABLET ORAL ONCE
Status: COMPLETED | OUTPATIENT
Start: 2025-04-13 | End: 2025-04-13

## 2025-04-13 RX ORDER — ROPINIROLE 1 MG/1
1 TABLET, FILM COATED ORAL NIGHTLY
Status: DISCONTINUED | OUTPATIENT
Start: 2025-04-13 | End: 2025-04-16 | Stop reason: HOSPADM

## 2025-04-13 RX ORDER — ERGOCALCIFEROL 1.25 MG/1
50000 CAPSULE, LIQUID FILLED ORAL WEEKLY
Status: DISCONTINUED | OUTPATIENT
Start: 2025-04-13 | End: 2025-04-16 | Stop reason: HOSPADM

## 2025-04-13 RX ORDER — BUTALBITAL, ACETAMINOPHEN AND CAFFEINE 50; 325; 40 MG/1; MG/1; MG/1
1 TABLET ORAL EVERY 12 HOURS PRN
Status: DISCONTINUED | OUTPATIENT
Start: 2025-04-13 | End: 2025-04-16 | Stop reason: HOSPADM

## 2025-04-13 RX ORDER — BUTALBITAL, ACETAMINOPHEN AND CAFFEINE 50; 325; 40 MG/1; MG/1; MG/1
1 TABLET ORAL EVERY 4 HOURS PRN
Status: DISCONTINUED | OUTPATIENT
Start: 2025-04-13 | End: 2025-04-13

## 2025-04-13 RX ADMIN — OXYCODONE 5 MG: 5 TABLET ORAL at 17:47

## 2025-04-13 RX ADMIN — BUDESONIDE AND FORMOTEROL FUMARATE DIHYDRATE 2 PUFF: 80; 4.5 AEROSOL RESPIRATORY (INHALATION) at 07:45

## 2025-04-13 RX ADMIN — CEPHALEXIN 500 MG: 500 CAPSULE ORAL at 22:24

## 2025-04-13 RX ADMIN — ROPINIROLE HYDROCHLORIDE 1 MG: 1 TABLET, FILM COATED ORAL at 22:20

## 2025-04-13 RX ADMIN — FAMOTIDINE 20 MG: 10 INJECTION, SOLUTION INTRAVENOUS at 22:21

## 2025-04-13 RX ADMIN — ERGOCALCIFEROL 50000 UNITS: 1.25 CAPSULE ORAL at 10:32

## 2025-04-13 RX ADMIN — BUPROPION HYDROCHLORIDE 100 MG: 100 TABLET, FILM COATED ORAL at 09:00

## 2025-04-13 RX ADMIN — ACETAMINOPHEN 650 MG: 325 TABLET ORAL at 08:10

## 2025-04-13 RX ADMIN — FAMOTIDINE 20 MG: 10 INJECTION, SOLUTION INTRAVENOUS at 09:26

## 2025-04-13 RX ADMIN — LEVOTHYROXINE SODIUM 75 MCG: 75 TABLET ORAL at 07:00

## 2025-04-13 RX ADMIN — BUPROPION HYDROCHLORIDE 100 MG: 100 TABLET, FILM COATED ORAL at 22:20

## 2025-04-13 RX ADMIN — CALCIUM CARBONATE-CHOLECALCIFEROL TAB 250 MG-125 UNIT 1 TABLET: 250-125 TAB at 09:00

## 2025-04-13 RX ADMIN — SODIUM CHLORIDE, PRESERVATIVE FREE 10 ML: 5 INJECTION INTRAVENOUS at 22:24

## 2025-04-13 RX ADMIN — ACETAMINOPHEN 650 MG: 325 TABLET ORAL at 22:24

## 2025-04-13 RX ADMIN — ROSUVASTATIN CALCIUM 20 MG: 20 TABLET, FILM COATED ORAL at 08:59

## 2025-04-13 RX ADMIN — ESCITALOPRAM OXALATE 10 MG: 10 TABLET ORAL at 08:59

## 2025-04-13 RX ADMIN — VITAM B12 50 MCG: 100 TAB at 09:00

## 2025-04-13 RX ADMIN — BUDESONIDE AND FORMOTEROL FUMARATE DIHYDRATE 2 PUFF: 80; 4.5 AEROSOL RESPIRATORY (INHALATION) at 21:09

## 2025-04-13 RX ADMIN — BUTALBITAL, ACETAMINOPHEN, AND CAFFEINE 1 TABLET: 50; 325; 40 TABLET ORAL at 15:03

## 2025-04-13 ASSESSMENT — PAIN DESCRIPTION - LOCATION
LOCATION: ARM
LOCATION: HEAD
LOCATION: HEAD;ARM
LOCATION: HEAD

## 2025-04-13 ASSESSMENT — PAIN DESCRIPTION - DESCRIPTORS
DESCRIPTORS: SORE
DESCRIPTORS: ACHING
DESCRIPTORS: ACHING

## 2025-04-13 ASSESSMENT — PAIN SCALES - GENERAL
PAINLEVEL_OUTOF10: 4
PAINLEVEL_OUTOF10: 7
PAINLEVEL_OUTOF10: 7
PAINLEVEL_OUTOF10: 6
PAINLEVEL_OUTOF10: 0
PAINLEVEL_OUTOF10: 0
PAINLEVEL_OUTOF10: 7

## 2025-04-13 ASSESSMENT — PAIN DESCRIPTION - ORIENTATION
ORIENTATION: LEFT
ORIENTATION: LEFT

## 2025-04-13 NOTE — PLAN OF CARE
Problem: Chronic Conditions and Co-morbidities  Goal: Patient's chronic conditions and co-morbidity symptoms are monitored and maintained or improved  4/13/2025 0509 by Petros Celeste RN  Outcome: Progressing  4/12/2025 1651 by Sarah Chawla RN  Outcome: Progressing     Problem: Discharge Planning  Goal: Discharge to home or other facility with appropriate resources  4/13/2025 0509 by Petros Celeste RN  Outcome: Progressing  4/12/2025 1651 by Sarah Chawla RN  Outcome: Progressing     Problem: Skin/Tissue Integrity  Goal: Skin integrity remains intact  Description: 1.  Monitor for areas of redness and/or skin breakdown  2.  Assess vascular access sites hourly  3.  Every 4-6 hours minimum:  Change oxygen saturation probe site  4.  Every 4-6 hours:  If on nasal continuous positive airway pressure, respiratory therapy assess nares and determine need for appliance change or resting period  4/13/2025 0509 by Petros Celeste RN  Outcome: Progressing  4/12/2025 1651 by Sarah Chawla RN  Outcome: Progressing     Problem: Safety - Adult  Goal: Free from fall injury  4/13/2025 0509 by Petros Celeste RN  Outcome: Progressing  4/12/2025 1651 by Sarah Chawla RN  Outcome: Progressing     Problem: Pain  Goal: Verbalizes/displays adequate comfort level or baseline comfort level  4/13/2025 0509 by Petros Celeste RN  Outcome: Progressing  4/12/2025 1651 by Sarah Chawla RN  Outcome: Progressing

## 2025-04-13 NOTE — CARE COORDINATION
Case Management   Daily Progress Note       Patient Name: Rica Enriquez                   YOB: 1950  Diagnosis: Angioedema [T78.3XXA]  Angioedema, initial encounter [T78.3XXA]  Angioedema of lips, initial encounter [T78.3XXA]                       GMLOS: 1.8 days  Length of Stay: 4  days    Anticipated Discharge Date: To be determined    Readmission Risk (Low < 19, Mod (19-27), High > 27): Readmission Risk Score: 11        Current Transitional Plan    [] Home Independently    [] Home with HC    [x] Skilled Nursing Facility    [] Acute Rehabilitation    [] Long Term Acute Care (LTAC)    [] Other:     Plan for the Stay (Medical Management) : PT/OT          Workflow Continuation (Additional Notes) : Spoke with patient she wants to go to SNF for rehab as she has had multiple falls at home. [facility] choice is [facility] as it is close to her daughter's home. Referral sent. Patient given SNF list to review for more choices if needed.    Post Acute Facility/Agency List     Provided patient with the following list, the list includes the overall star ratings obtained from CMS per the Medicare Web site (www.Medicare.gov):     [] Long Term Acute Care Facilities  [] Acute Inpatient Rehabilitation Facilities  [x] Skilled Nursing Facilities  [] Home Care  [] Patient's Insurance specific coverage list for SNF    Provided verbal instructions on how to utilize the QR Code to obtain additional detailed star ratings from www.Medicare.gov     offered to print and provide the detailed list:    []Accepted   [x]Declined        Radha Mays RN  April 13, 2025

## 2025-04-13 NOTE — PLAN OF CARE
--  NO ACUTE NEUROSURGICAL INTERVENTION AT THIS TIME  MRI cervical and thoracic reviewed with Dr Jeannette Espinoza  No neurosurgical interventions needed  Follow up in 2 weeks with [facility] for [facility]    NEUROSURGERY TO SIGN OFF     Please contact Neurosurgery with any questions.    PATIENT TO FOLLOW UP IN CLINIC:  ---  Follow-up with Neurosurgery  [facility]  [address] [place] [address]/[facility] (Medical Office Building [address])  [address]  Call [phone] for an appointment.  --

## 2025-04-13 NOTE — PLAN OF CARE
Problem: Respiratory - Adult  Goal: Achieves optimal ventilation and oxygenation  4/12/2025 2218 by Angelique Armijo, KASH  Outcome: Progressing    BRONCHOSPASM/BRONCHOCONSTRICTION     [x]         IMPROVE AERATION/BREATH SOUNDS  [x]   ADMINISTER BRONCHODILATOR THERAPY AS APPROPRIATE  [x]   ASSESS BREATH SOUNDS  [x]   IMPLEMENT AEROSOL/MDI PROTOCOL  [x]   PATIENT EDUCATION AS NEEDED

## 2025-04-14 PROBLEM — M79.89 PAIN AND SWELLING OF LEFT FOREARM: Status: ACTIVE | Noted: 2025-04-14

## 2025-04-14 PROBLEM — M79.632 PAIN AND SWELLING OF LEFT FOREARM: Status: ACTIVE | Noted: 2025-04-14

## 2025-04-14 LAB
FREE KAPPA/LAMBDA RATIO: 0.82 (ref 0.22–1.74)
GLUCOSE BLD-MCNC: 105 MG/DL (ref 65–105)
GLUCOSE BLD-MCNC: 105 MG/DL (ref 65–105)
GLUCOSE BLD-MCNC: 116 MG/DL (ref 65–105)
GLUCOSE BLD-MCNC: 122 MG/DL (ref 65–105)
IGA SERPL-MCNC: 218 MG/DL (ref 70–400)
IGG SERPL-MCNC: 524 MG/DL (ref 700–1600)
IGM SERPL-MCNC: 28 MG/DL (ref 40–230)
KAPPA LC FREE SER-MCNC: 29.4 MG/L
LAMBDA LC FREE SERPL-MCNC: 35.8 MG/L (ref 4.2–27.7)

## 2025-04-14 PROCEDURE — 84166 PROTEIN E-PHORESIS/URINE/CSF: CPT

## 2025-04-14 PROCEDURE — 97530 THERAPEUTIC ACTIVITIES: CPT

## 2025-04-14 PROCEDURE — 2500000003 HC RX 250 WO HCPCS

## 2025-04-14 PROCEDURE — 82784 ASSAY IGA/IGD/IGG/IGM EACH: CPT

## 2025-04-14 PROCEDURE — 6370000000 HC RX 637 (ALT 250 FOR IP)

## 2025-04-14 PROCEDURE — 6370000000 HC RX 637 (ALT 250 FOR IP): Performed by: INTERNAL MEDICINE

## 2025-04-14 PROCEDURE — 36415 COLL VENOUS BLD VENIPUNCTURE: CPT

## 2025-04-14 PROCEDURE — 6370000000 HC RX 637 (ALT 250 FOR IP): Performed by: NURSE PRACTITIONER

## 2025-04-14 PROCEDURE — 86334 IMMUNOFIX E-PHORESIS SERUM: CPT

## 2025-04-14 PROCEDURE — 1200000000 HC SEMI PRIVATE

## 2025-04-14 PROCEDURE — 84156 ASSAY OF PROTEIN URINE: CPT

## 2025-04-14 PROCEDURE — 84165 PROTEIN E-PHORESIS SERUM: CPT

## 2025-04-14 PROCEDURE — 84155 ASSAY OF PROTEIN SERUM: CPT

## 2025-04-14 PROCEDURE — 94640 AIRWAY INHALATION TREATMENT: CPT

## 2025-04-14 PROCEDURE — 6360000002 HC RX W HCPCS

## 2025-04-14 PROCEDURE — 6370000000 HC RX 637 (ALT 250 FOR IP): Performed by: STUDENT IN AN ORGANIZED HEALTH CARE EDUCATION/TRAINING PROGRAM

## 2025-04-14 PROCEDURE — 2580000003 HC RX 258

## 2025-04-14 PROCEDURE — 99232 SBSQ HOSP IP/OBS MODERATE 35: CPT | Performed by: INTERNAL MEDICINE

## 2025-04-14 PROCEDURE — 99223 1ST HOSP IP/OBS HIGH 75: CPT | Performed by: GENERAL PRACTICE

## 2025-04-14 PROCEDURE — 82947 ASSAY GLUCOSE BLOOD QUANT: CPT

## 2025-04-14 PROCEDURE — 83521 IG LIGHT CHAINS FREE EACH: CPT

## 2025-04-14 PROCEDURE — 97116 GAIT TRAINING THERAPY: CPT

## 2025-04-14 RX ORDER — LINEZOLID 600 MG/1
600 TABLET, FILM COATED ORAL EVERY 12 HOURS SCHEDULED
Status: DISCONTINUED | OUTPATIENT
Start: 2025-04-14 | End: 2025-04-16 | Stop reason: HOSPADM

## 2025-04-14 RX ORDER — OXYCODONE HYDROCHLORIDE 5 MG/1
5 TABLET ORAL EVERY 4 HOURS PRN
Refills: 0 | Status: DISCONTINUED | OUTPATIENT
Start: 2025-04-14 | End: 2025-04-16 | Stop reason: HOSPADM

## 2025-04-14 RX ADMIN — ROSUVASTATIN CALCIUM 20 MG: 20 TABLET, FILM COATED ORAL at 09:36

## 2025-04-14 RX ADMIN — ACETAMINOPHEN 650 MG: 325 TABLET ORAL at 19:44

## 2025-04-14 RX ADMIN — CEPHALEXIN 500 MG: 500 CAPSULE ORAL at 04:29

## 2025-04-14 RX ADMIN — OXYCODONE HYDROCHLORIDE 5 MG: 5 TABLET ORAL at 14:37

## 2025-04-14 RX ADMIN — OXYCODONE HYDROCHLORIDE 5 MG: 5 TABLET ORAL at 11:12

## 2025-04-14 RX ADMIN — FAMOTIDINE 20 MG: 10 INJECTION, SOLUTION INTRAVENOUS at 09:39

## 2025-04-14 RX ADMIN — ESCITALOPRAM OXALATE 10 MG: 10 TABLET ORAL at 09:35

## 2025-04-14 RX ADMIN — SODIUM CHLORIDE, PRESERVATIVE FREE 10 ML: 5 INJECTION INTRAVENOUS at 09:41

## 2025-04-14 RX ADMIN — CALCIUM CARBONATE-CHOLECALCIFEROL TAB 250 MG-125 UNIT 1 TABLET: 250-125 TAB at 09:36

## 2025-04-14 RX ADMIN — BUPROPION HYDROCHLORIDE 100 MG: 100 TABLET, FILM COATED ORAL at 09:36

## 2025-04-14 RX ADMIN — LINEZOLID 600 MG: 600 TABLET, FILM COATED ORAL at 11:12

## 2025-04-14 RX ADMIN — LINEZOLID 600 MG: 600 TABLET, FILM COATED ORAL at 19:44

## 2025-04-14 RX ADMIN — LEVOTHYROXINE SODIUM 75 MCG: 75 TABLET ORAL at 06:30

## 2025-04-14 RX ADMIN — BUDESONIDE AND FORMOTEROL FUMARATE DIHYDRATE 2 PUFF: 80; 4.5 AEROSOL RESPIRATORY (INHALATION) at 08:05

## 2025-04-14 RX ADMIN — ROPINIROLE HYDROCHLORIDE 1 MG: 1 TABLET, FILM COATED ORAL at 19:45

## 2025-04-14 RX ADMIN — BUTALBITAL, ACETAMINOPHEN, AND CAFFEINE 1 TABLET: 50; 325; 40 TABLET ORAL at 04:30

## 2025-04-14 RX ADMIN — BUDESONIDE AND FORMOTEROL FUMARATE DIHYDRATE 2 PUFF: 80; 4.5 AEROSOL RESPIRATORY (INHALATION) at 21:12

## 2025-04-14 RX ADMIN — OXYCODONE HYDROCHLORIDE 5 MG: 5 TABLET ORAL at 18:38

## 2025-04-14 RX ADMIN — METOPROLOL SUCCINATE 150 MG: 25 TABLET, EXTENDED RELEASE ORAL at 19:45

## 2025-04-14 RX ADMIN — VITAM B12 50 MCG: 100 TAB at 09:36

## 2025-04-14 RX ADMIN — SODIUM CHLORIDE, PRESERVATIVE FREE 10 ML: 5 INJECTION INTRAVENOUS at 19:45

## 2025-04-14 RX ADMIN — FAMOTIDINE 20 MG: 10 INJECTION, SOLUTION INTRAVENOUS at 19:45

## 2025-04-14 ASSESSMENT — PAIN DESCRIPTION - LOCATION
LOCATION: HEAD
LOCATION: HEAD
LOCATION: ARM
LOCATION: ARM

## 2025-04-14 ASSESSMENT — PAIN SCALES - WONG BAKER: WONGBAKER_NUMERICALRESPONSE: HURTS EVEN MORE

## 2025-04-14 ASSESSMENT — PAIN SCALES - GENERAL
PAINLEVEL_OUTOF10: 9
PAINLEVEL_OUTOF10: 6
PAINLEVEL_OUTOF10: 7
PAINLEVEL_OUTOF10: 5
PAINLEVEL_OUTOF10: 6
PAINLEVEL_OUTOF10: 6
PAINLEVEL_OUTOF10: 4
PAINLEVEL_OUTOF10: 0
PAINLEVEL_OUTOF10: 10
PAINLEVEL_OUTOF10: 7

## 2025-04-14 ASSESSMENT — PAIN DESCRIPTION - ORIENTATION
ORIENTATION: LEFT
ORIENTATION: ANTERIOR

## 2025-04-14 ASSESSMENT — PAIN DESCRIPTION - DESCRIPTORS
DESCRIPTORS: ACHING
DESCRIPTORS: SHOOTING
DESCRIPTORS: ACHING

## 2025-04-14 ASSESSMENT — PAIN DESCRIPTION - ONSET: ONSET: ON-GOING

## 2025-04-14 ASSESSMENT — PAIN DESCRIPTION - FREQUENCY: FREQUENCY: CONTINUOUS

## 2025-04-14 NOTE — CARE COORDINATION
Case Management   Daily Progress Note       Patient Name: Rica Enriquez                   YOB: 1950  Diagnosis: Angioedema [T78.3XXA]  Angioedema, initial encounter [T78.3XXA]  Angioedema of lips, initial encounter [T78.3XXA]                       GMLOS: 1.8 days  Length of Stay: 5  days    Anticipated Discharge Date: One day until discharge    Readmission Risk (Low < 19, Mod (19-27), High > 27): Readmission Risk Score: 11.8        Current Transitional Plan    [] Home Independently    [] Home with HC    [x] Skilled Nursing Facility    [] Acute Rehabilitation    [] Long Term Acute Care (LTAC)    [] Other:     Plan for the Stay (Medical Management) :    Need additional SNF choices.       Workflow Continuation (Additional Notes) :    0942: CM received voicemail from joselito James for [facility]. Agency is unable to accept.     Pallavi Ibrahim  April 14, 2025

## 2025-04-14 NOTE — PLAN OF CARE
Problem: Chronic Conditions and Co-morbidities  Goal: Patient's chronic conditions and co-morbidity symptoms are monitored and maintained or improved  Outcome: Progressing     Problem: Discharge Planning  Goal: Discharge to home or other facility with appropriate resources  Outcome: Progressing     Problem: Skin/Tissue Integrity  Goal: Skin integrity remains intact  Description: 1.  Monitor for areas of redness and/or skin breakdown  2.  Assess vascular access sites hourly  3.  Every 4-6 hours minimum:  Change oxygen saturation probe site  4.  Every 4-6 hours:  If on nasal continuous positive airway pressure, respiratory therapy assess nares and determine need for appliance change or resting period  Outcome: Progressing     Problem: Safety - Adult  Goal: Free from fall injury  Outcome: Progressing     Problem: Pain  Goal: Verbalizes/displays adequate comfort level or baseline comfort level  Outcome: Progressing     Problem: Respiratory - Adult  Goal: Achieves optimal ventilation and oxygenation  Outcome: Progressing     Problem: Self Harm/Suicidality  Goal: Will have no self-injury during hospital stay  Description: INTERVENTIONS:  1.  Ensure constant observer at bedside with Q15M safety checks  2.  Maintain a safe environment  3.  Secure patient belongings  4.  Ensure family/visitors adhere to safety recommendations  5.  Ensure safety tray has been added to patient's diet order  6.  Every shift and PRN: Re-assess suicidal risk via Frequent Screener    Outcome: Progressing     Problem: Depression/Self Harm  Goal: Effect of psychiatric condition will be minimized and patient will be protected from self harm  Description: INTERVENTIONS:  1. Assess impact of patient's symptoms on level of functioning, self care needs and offer support as indicated  2. Assess patient/family knowledge of depression, impact on illness and need for teaching  3. Provide emotional support, presence and reassurance  4. Assess for possible

## 2025-04-14 NOTE — CONSULTS
Department of Neurosurgery                                            Nurse Practitioner Consult Note      Reason for Consult: Acute epidural versus subacute dural versus hygroma with shift  Requesting Physician: Anna  Neurosurgeon:   [] Dr. Chan  [x] Dr. Machado  [] Dr. Pryor  [] Dr. Henley    Neurosurgery notified of consult   Neurosurgery arrival to bedside     History Obtained From:  patient    CHIEF COMPLAINT:          Angio edema Frequent falls      HISTORY OF PRESENT ILLNESS:       The patient is a 74 y.o. female who presented as a transfer from [facility] on 2025 due to concern of angioedema likely secondary to lisinopril.  Patient does have a significant past medical history of COPD on 3 L as well as CKD type 2 diabetes, hypertension hypothyroidism.  Patient was transferred to the floor on 2025 at that time neurology was consulted secondary to history of frequent falls with ataxia.  Neurology ordered a MRI brain, MRA head and carotid Doppler.  MRI was completed that demonstrated a biconvex extra-axial hematoma representing a epidural hematoma or expanding subdural hematoma measuring up to 21 mm in maximum thickness there is a 5 mm right-to-left shift secondary to these findings neurosurgery been asked to evaluate the patient.       PAST MEDICAL HISTORY :       Past Medical History:        Diagnosis Date    Chronic kidney disease     Cystic fibrosis     Diabetes mellitus (HCC)     Family history of colon cancer     Fibromyalgia     History of colon polyps     History of colonic diverticulitis     Hyperlipidemia     Hypertension     Obesity (BMI 30-39.9)     Thyroid disease        Past Surgical History:        Procedure Laterality Date    BREAST LUMPECTOMY Left      SECTION      CHOLECYSTECTOMY      COLONOSCOPY      COLONOSCOPY      César. emerald.    COLONOSCOPY N/A 2021    COLONOSCOPY POLYPECTOMY SNARE/COLD BIOPSY performed by Jasmyn Red DO 
Physical Medicine & Rehabilitation  Consult Note      Admitting Physician:   Shanell Levy DO    Primary Care Provider:   Huong Glasgow MD     Reason for Consult:  Acute Inpatient Rehabilitation    Chief Complaint: Left arm pain    History of Present Illness:  Referring Provider is requesting an evaluation for appropriate placement upon discharge from acute care. History from chart review and patient.    Rica Enriquez is a 74 y.o. right handed female admitted to [facility] on 4/9/2025.      74-year-old female presenting to [facility] 4/09 for angioedema reaction thought to be secondary to lisinopril.  In [facility] ED given 1 unit FFP, Decadron, Benadryl, epinephrine, Pepcid.  This does improve symptoms the patient was transferred to [facility] for further care due to concerns of her airway monitoring.  In the ICU patient continue Decadron therapy, and was on her baseline supplemental O2 3L via NC.     Psychiatry consulted for suicidal ideation, though patient admits this was a \"dumb statement\" made in [facility] and she was cleared by psychiatry.  Patient takes Wellbutrin and Lexapro for depression.    Neurology was consulted for frequent falls with ataxia, patient reported multiple falls for quite some time with an average of 1 fall per week.  MRI obtained which showed subacute right subdural hematoma.  Neurosurgery consulted, no acute need for evacuation, not thought to be the cause of frequent falls.  Neurology obtained EEG, which was normal.  MRI cervical and thoracic spine showed no significant spinal canal stenosis.  B12 low.    Patient noting worsening left arm erythema 4/14, duplex ordered and pending.        Review of Systems:  Constitutional: negative for anorexia, chills, fatigue, fevers, sweats and weight loss  Eyes: negative for redness and visual disturbance  Ears, nose, mouth, throat, and face: negative for earaches, sore throat and tinnitus  Respiratory: negative for 
with feeling overwhelmed but denies excessive worry or panic attacks. 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 She denies hallucinations, paranoia, or delusions. She does not elicit symptoms of nadrés or psychosis. She endorses 1 episode of visual hallucination of a girl after she took an unknown pill she dropped on the floor believed to be her blood pressure pill. She reports this happened several years ago.     Patient denies history of psychiatric hospitalization. She reports previous link with Dr. Squires for psychotherapy and medication management for depression. She reports currently taking Wellbutrin 300 mg daily and Lexapro 10 mg daily as prescribed by per PCP.  She reports previous trial of Cymbalta and Seroquel. She denies taking Gabapentin and Provigil. She reports utilizing a CPAP for PATI and history of sleep walking. She denies using drugs or alcohol.     Patient adamantly denies suicidal ideation. She admits to previous thoughts of being with her   but states \"I have too much to live for\". She anticipates opening an online store and getting a puppy. She reports good social support and strong Jew jaki. She has 3 adult children and 12 grandchildren.       At this time, patient does not require admission to [facility]. She is not actively suicidal and does not pose risk of harm to self or others. She is open to a medication adjustment but not at this time. She is willing to re-establish services with a psychiatrist. Writer discussed potential need for home health services and she is not willing to explore that option at this time. Recommend to discontinue suicide precautions and sitter.    The patient is currently receiving care for the above psychiatric illness.    Past Psychiatric History:  Prior Diagnosis: depression, anxiety  Outpatient psychiatric provider: None, previously linked with Dr. Squires  Psychiatric Hospitalization: no  Hx of 
made to ensure the accuracy of this automated transcription, some errors in transcription may have occurred.

## 2025-04-14 NOTE — PLAN OF CARE
Problem: Chronic Conditions and Co-morbidities  Goal: Patient's chronic conditions and co-morbidity symptoms are monitored and maintained or improved  4/14/2025 0245 by Randa Madden RN  Outcome: Progressing     Problem: Discharge Planning  Goal: Discharge to home or other facility with appropriate resources  4/14/2025 0245 by Randa Madden RN  Outcome: Progressing     Problem: Skin/Tissue Integrity  Goal: Skin integrity remains intact  Description: 1.  Monitor for areas of redness and/or skin breakdown  2.  Assess vascular access sites hourly  3.  Every 4-6 hours minimum:  Change oxygen saturation probe site  4.  Every 4-6 hours:  If on nasal continuous positive airway pressure, respiratory therapy assess nares and determine need for appliance change or resting period  4/14/2025 0245 by Randa Madden RN  Outcome: Progressing     Problem: Safety - Adult  Goal: Free from fall injury  4/14/2025 0245 by Randa Madden RN  Outcome: Progressing     Problem: Pain  Goal: Verbalizes/displays adequate comfort level or baseline comfort level  4/14/2025 0245 by Randa Madden RN  Outcome: Progressing     Problem: Respiratory - Adult  Goal: Achieves optimal ventilation and oxygenation  4/14/2025 0245 by Randa Madden RN  Outcome: Progressing

## 2025-04-15 ENCOUNTER — APPOINTMENT (OUTPATIENT)
Dept: VASCULAR LAB | Age: 75
DRG: 915 | End: 2025-04-15
Attending: INTERNAL MEDICINE
Payer: MEDICARE

## 2025-04-15 LAB
ALBUMIN PERCENT: 55 % (ref 56–66)
ALBUMIN SERPL-MCNC: 2.8 G/DL (ref 3.2–5.2)
ALPHA 2 PERCENT: 16 % (ref 7–12)
ALPHA1 GLOB SERPL ELPH-MCNC: 0.4 G/DL (ref 0.1–0.4)
ALPHA1 GLOB SERPL ELPH-MCNC: 7 % (ref 3–5)
ALPHA2 GLOB SERPL ELPH-MCNC: 0.8 G/DL (ref 0.5–0.9)
B-GLOBULIN SERPL ELPH-MCNC: 0.7 G/DL (ref 0.7–1.4)
B-GLOBULIN SERPL ELPH-MCNC: 13 % (ref 8–13)
ECHO BSA: 2.04 M2
GAMMA GLOB SERPL ELPH-MCNC: 0.5 G/DL (ref 0.5–1.5)
GAMMA GLOBULIN %: 9 % (ref 11–19)
GLUCOSE BLD-MCNC: 105 MG/DL (ref 65–105)
GLUCOSE BLD-MCNC: 107 MG/DL (ref 65–105)
GLUCOSE BLD-MCNC: 110 MG/DL (ref 65–105)
GLUCOSE BLD-MCNC: 91 MG/DL (ref 65–105)
GLUCOSE BLD-MCNC: 97 MG/DL (ref 65–105)
ITYP INTERPRETATION: NORMAL
PATH REV: NORMAL
PATHOLOGIST: ABNORMAL
PROT PATTERN SERPL ELPH-IMP: ABNORMAL
PROT SERPL-MCNC: 5.1 G/DL (ref 6.6–8.7)
TOTAL PROT. SUM,%: 100 % (ref 98–102)
TOTAL PROT. SUM: 5.2 G/DL (ref 6.3–8.2)

## 2025-04-15 PROCEDURE — 6370000000 HC RX 637 (ALT 250 FOR IP)

## 2025-04-15 PROCEDURE — 94640 AIRWAY INHALATION TREATMENT: CPT

## 2025-04-15 PROCEDURE — 2500000003 HC RX 250 WO HCPCS

## 2025-04-15 PROCEDURE — 97535 SELF CARE MNGMENT TRAINING: CPT

## 2025-04-15 PROCEDURE — 6370000000 HC RX 637 (ALT 250 FOR IP): Performed by: NURSE PRACTITIONER

## 2025-04-15 PROCEDURE — 82947 ASSAY GLUCOSE BLOOD QUANT: CPT

## 2025-04-15 PROCEDURE — 1200000000 HC SEMI PRIVATE

## 2025-04-15 PROCEDURE — 99232 SBSQ HOSP IP/OBS MODERATE 35: CPT | Performed by: STUDENT IN AN ORGANIZED HEALTH CARE EDUCATION/TRAINING PROGRAM

## 2025-04-15 PROCEDURE — 6370000000 HC RX 637 (ALT 250 FOR IP): Performed by: STUDENT IN AN ORGANIZED HEALTH CARE EDUCATION/TRAINING PROGRAM

## 2025-04-15 PROCEDURE — 93971 EXTREMITY STUDY: CPT

## 2025-04-15 PROCEDURE — 6370000000 HC RX 637 (ALT 250 FOR IP): Performed by: INTERNAL MEDICINE

## 2025-04-15 RX ORDER — FAMOTIDINE 20 MG/1
20 TABLET, FILM COATED ORAL 2 TIMES DAILY
Status: DISCONTINUED | OUTPATIENT
Start: 2025-04-15 | End: 2025-04-15

## 2025-04-15 RX ORDER — CALCIUM CARBONATE 500 MG/1
500 TABLET, CHEWABLE ORAL 3 TIMES DAILY PRN
Status: DISCONTINUED | OUTPATIENT
Start: 2025-04-15 | End: 2025-04-16 | Stop reason: HOSPADM

## 2025-04-15 RX ORDER — PANTOPRAZOLE SODIUM 40 MG/1
40 TABLET, DELAYED RELEASE ORAL
Status: DISCONTINUED | OUTPATIENT
Start: 2025-04-16 | End: 2025-04-16 | Stop reason: HOSPADM

## 2025-04-15 RX ADMIN — ROSUVASTATIN CALCIUM 20 MG: 20 TABLET, FILM COATED ORAL at 09:58

## 2025-04-15 RX ADMIN — LINEZOLID 600 MG: 600 TABLET, FILM COATED ORAL at 20:42

## 2025-04-15 RX ADMIN — LINEZOLID 600 MG: 600 TABLET, FILM COATED ORAL at 09:58

## 2025-04-15 RX ADMIN — BUDESONIDE AND FORMOTEROL FUMARATE DIHYDRATE 2 PUFF: 80; 4.5 AEROSOL RESPIRATORY (INHALATION) at 21:05

## 2025-04-15 RX ADMIN — LEVOTHYROXINE SODIUM 75 MCG: 75 TABLET ORAL at 06:13

## 2025-04-15 RX ADMIN — OXYCODONE HYDROCHLORIDE 5 MG: 5 TABLET ORAL at 11:27

## 2025-04-15 RX ADMIN — METOPROLOL SUCCINATE 150 MG: 25 TABLET, EXTENDED RELEASE ORAL at 20:43

## 2025-04-15 RX ADMIN — SODIUM CHLORIDE, PRESERVATIVE FREE 10 ML: 5 INJECTION INTRAVENOUS at 10:04

## 2025-04-15 RX ADMIN — SODIUM CHLORIDE, PRESERVATIVE FREE 10 ML: 5 INJECTION INTRAVENOUS at 20:43

## 2025-04-15 RX ADMIN — FAMOTIDINE 20 MG: 20 TABLET, FILM COATED ORAL at 10:04

## 2025-04-15 RX ADMIN — OXYCODONE HYDROCHLORIDE 5 MG: 5 TABLET ORAL at 00:58

## 2025-04-15 RX ADMIN — ROPINIROLE HYDROCHLORIDE 1 MG: 1 TABLET, FILM COATED ORAL at 20:42

## 2025-04-15 RX ADMIN — OXYCODONE HYDROCHLORIDE 5 MG: 5 TABLET ORAL at 22:03

## 2025-04-15 RX ADMIN — CALCIUM CARBONATE-CHOLECALCIFEROL TAB 250 MG-125 UNIT 1 TABLET: 250-125 TAB at 09:59

## 2025-04-15 RX ADMIN — VITAM B12 50 MCG: 100 TAB at 09:59

## 2025-04-15 ASSESSMENT — PAIN SCALES - GENERAL
PAINLEVEL_OUTOF10: 5
PAINLEVEL_OUTOF10: 0
PAINLEVEL_OUTOF10: 7
PAINLEVEL_OUTOF10: 6
PAINLEVEL_OUTOF10: 3
PAINLEVEL_OUTOF10: 7

## 2025-04-15 ASSESSMENT — ENCOUNTER SYMPTOMS
COUGH: 0
SORE THROAT: 0
TROUBLE SWALLOWING: 0
ABDOMINAL PAIN: 0
BACK PAIN: 0
COLOR CHANGE: 0
VOMITING: 0
WHEEZING: 0
NAUSEA: 0
BLOOD IN STOOL: 0
DIARRHEA: 0
CONSTIPATION: 0
SHORTNESS OF BREATH: 0

## 2025-04-15 ASSESSMENT — PAIN DESCRIPTION - LOCATION
LOCATION: ARM;HEAD
LOCATION: BACK;HEAD
LOCATION: HEAD

## 2025-04-15 ASSESSMENT — PAIN DESCRIPTION - DESCRIPTORS
DESCRIPTORS: ACHING
DESCRIPTORS: DISCOMFORT
DESCRIPTORS: ACHING

## 2025-04-15 ASSESSMENT — PAIN DESCRIPTION - ORIENTATION
ORIENTATION: ANTERIOR
ORIENTATION: LEFT

## 2025-04-15 NOTE — CARE COORDINATION
Case Management   Daily Progress Note       Patient Name: Rica Enriquez                   YOB: 1950  Diagnosis: Angioedema [T78.3XXA]  Angioedema, initial encounter [T78.3XXA]  Angioedema of lips, initial encounter [T78.3XXA]                       GMLOS: 3.7 days  Length of Stay: 6  days    Anticipated Discharge Date: To be determined    Readmission Risk (Low < 19, Mod (19-27), High > 27): Readmission Risk Score: 13.3        Current Transitional Plan    [] Home Independently    [] Home with HC    [] Skilled Nursing Facility    [] Acute Rehabilitation    [] Long Term Acute Care (LTAC)    [] Other:     Plan for the Stay (Medical Management) :          Workflow Continuation (Additional Notes) : Spoke with patient, updated on [facility] not being able to accept her. She states she wants a referral sent to [facility]. Referral sent to [facility] rehab ([facility]).    1500 Received VM from [facility], state they are able to accept patient.        Radha Mays RN  April 15, 2025

## 2025-04-15 NOTE — PLAN OF CARE
Problem: Respiratory - Adult  Goal: Achieves optimal ventilation and oxygenation  4/14/2025 2120 by Angelique Armijo RCP  Outcome: Progressing     PROVIDE ADEQUATE OXYGENATION WITH ACCEPTABLE SP02/ABG'S    [x]  IDENTIFY APPROPRIATE OXYGEN THERAPY  [x]   MONITOR SP02/ABG'S AS NEEDED   [x]   PATIENT EDUCATION AS NEEDED    BRONCHOSPASM/BRONCHOCONSTRICTION     [x]         IMPROVE AERATION/BREATH SOUNDS  [x]   ADMINISTER BRONCHODILATOR THERAPY AS APPROPRIATE  [x]   ASSESS BREATH SOUNDS  [x]   IMPLEMENT AEROSOL/MDI PROTOCOL  [x]   PATIENT EDUCATION AS NEEDED

## 2025-04-15 NOTE — PLAN OF CARE
Problem: Chronic Conditions and Co-morbidities  Goal: Patient's chronic conditions and co-morbidity symptoms are monitored and maintained or improved  4/15/2025 0029 by Mina Villalobos RN  Outcome: Progressing  4/14/2025 1803 by Willy Menjivar  Outcome: Progressing     Problem: Discharge Planning  Goal: Discharge to home or other facility with appropriate resources  4/15/2025 0029 by Mina Villalobos RN  Outcome: Progressing  4/14/2025 1803 by Willy Menjivar  Outcome: Progressing     Problem: Skin/Tissue Integrity  Goal: Skin integrity remains intact  Description: 1.  Monitor for areas of redness and/or skin breakdown  2.  Assess vascular access sites hourly  3.  Every 4-6 hours minimum:  Change oxygen saturation probe site  4.  Every 4-6 hours:  If on nasal continuous positive airway pressure, respiratory therapy assess nares and determine need for appliance change or resting period  4/15/2025 0029 by Mina Villalobos RN  Outcome: Progressing  4/14/2025 1803 by Willy Menjivar  Outcome: Progressing     Problem: Safety - Adult  Goal: Free from fall injury  4/15/2025 0029 by Mina Villalobos RN  Outcome: Progressing  4/14/2025 1803 by Willy Menjivar  Outcome: Progressing     Problem: Pain  Goal: Verbalizes/displays adequate comfort level or baseline comfort level  4/15/2025 0029 by Mina Villalobos RN  Outcome: Progressing  4/14/2025 1803 by Willy Menjivar  Outcome: Progressing     Problem: Respiratory - Adult  Goal: Achieves optimal ventilation and oxygenation  4/15/2025 0029 by Mina Villalobos RN  Outcome: Progressing  4/14/2025 2120 by Angelique Armijo RCP  Outcome: Progressing  4/14/2025 1803 by Willy Menjivar  Outcome: Progressing     Problem: Self Harm/Suicidality  Goal: Will have no self-injury during hospital stay  Description: INTERVENTIONS:  1.  Ensure constant observer at bedside with Q15M safety checks  2.  Maintain a safe environment  3.

## 2025-04-16 VITALS
HEART RATE: 66 BPM | TEMPERATURE: 98.1 F | OXYGEN SATURATION: 95 % | RESPIRATION RATE: 18 BRPM | BODY MASS INDEX: 34.27 KG/M2 | SYSTOLIC BLOOD PRESSURE: 127 MMHG | HEIGHT: 65 IN | WEIGHT: 205.69 LBS | DIASTOLIC BLOOD PRESSURE: 84 MMHG

## 2025-04-16 LAB
ANION GAP SERPL CALCULATED.3IONS-SCNC: 12 MMOL/L (ref 9–16)
BUN SERPL-MCNC: 14 MG/DL (ref 8–23)
CALCIUM SERPL-MCNC: 8.4 MG/DL (ref 8.6–10.4)
CHLORIDE SERPL-SCNC: 101 MMOL/L (ref 98–107)
CO2 SERPL-SCNC: 24 MMOL/L (ref 20–31)
CREAT SERPL-MCNC: 1.1 MG/DL (ref 0.6–0.9)
ERYTHROCYTE [DISTWIDTH] IN BLOOD BY AUTOMATED COUNT: 13.2 % (ref 11.8–14.4)
GFR, ESTIMATED: 53 ML/MIN/1.73M2
GLUCOSE BLD-MCNC: 107 MG/DL (ref 65–105)
GLUCOSE BLD-MCNC: 98 MG/DL (ref 65–105)
GLUCOSE SERPL-MCNC: 115 MG/DL (ref 74–99)
HCT VFR BLD AUTO: 33.6 % (ref 36.3–47.1)
HGB BLD-MCNC: 11.1 G/DL (ref 11.9–15.1)
MCH RBC QN AUTO: 29.1 PG (ref 25.2–33.5)
MCHC RBC AUTO-ENTMCNC: 33 G/DL (ref 28.4–34.8)
MCV RBC AUTO: 88 FL (ref 82.6–102.9)
NRBC BLD-RTO: 0 PER 100 WBC
P E INTERPRETATION, U: NORMAL
PATHOLOGIST: NORMAL
PLATELET # BLD AUTO: ABNORMAL K/UL (ref 138–453)
PLATELET, FLUORESCENCE: 92 K/UL (ref 138–453)
PLATELETS.RETICULATED NFR BLD AUTO: 4.8 % (ref 1.1–10.3)
POTASSIUM SERPL-SCNC: 3.6 MMOL/L (ref 3.7–5.3)
RBC # BLD AUTO: 3.82 M/UL (ref 3.95–5.11)
SODIUM SERPL-SCNC: 137 MMOL/L (ref 136–145)
SPECIMEN TYPE: NORMAL
URINE TOTAL PROTEIN: 42 MG/DL
WBC OTHER # BLD: 3.5 K/UL (ref 3.5–11.3)

## 2025-04-16 PROCEDURE — 2700000000 HC OXYGEN THERAPY PER DAY

## 2025-04-16 PROCEDURE — 6370000000 HC RX 637 (ALT 250 FOR IP)

## 2025-04-16 PROCEDURE — 97535 SELF CARE MNGMENT TRAINING: CPT

## 2025-04-16 PROCEDURE — 6370000000 HC RX 637 (ALT 250 FOR IP): Performed by: STUDENT IN AN ORGANIZED HEALTH CARE EDUCATION/TRAINING PROGRAM

## 2025-04-16 PROCEDURE — 82947 ASSAY GLUCOSE BLOOD QUANT: CPT

## 2025-04-16 PROCEDURE — 80048 BASIC METABOLIC PNL TOTAL CA: CPT

## 2025-04-16 PROCEDURE — 85055 RETICULATED PLATELET ASSAY: CPT

## 2025-04-16 PROCEDURE — 36415 COLL VENOUS BLD VENIPUNCTURE: CPT

## 2025-04-16 PROCEDURE — 85027 COMPLETE CBC AUTOMATED: CPT

## 2025-04-16 PROCEDURE — 94640 AIRWAY INHALATION TREATMENT: CPT

## 2025-04-16 PROCEDURE — 6370000000 HC RX 637 (ALT 250 FOR IP): Performed by: INTERNAL MEDICINE

## 2025-04-16 PROCEDURE — 97530 THERAPEUTIC ACTIVITIES: CPT

## 2025-04-16 PROCEDURE — 99239 HOSP IP/OBS DSCHRG MGMT >30: CPT | Performed by: STUDENT IN AN ORGANIZED HEALTH CARE EDUCATION/TRAINING PROGRAM

## 2025-04-16 PROCEDURE — 94761 N-INVAS EAR/PLS OXIMETRY MLT: CPT

## 2025-04-16 PROCEDURE — 2500000003 HC RX 250 WO HCPCS

## 2025-04-16 RX ORDER — METOPROLOL SUCCINATE 50 MG/1
150 TABLET, EXTENDED RELEASE ORAL NIGHTLY
Qty: 30 TABLET | Refills: 3 | Status: SHIPPED | OUTPATIENT
Start: 2025-04-16

## 2025-04-16 RX ORDER — ROPINIROLE 1 MG/1
1 TABLET, FILM COATED ORAL NIGHTLY
Qty: 90 TABLET | Refills: 3 | Status: SHIPPED | OUTPATIENT
Start: 2025-04-16

## 2025-04-16 RX ORDER — ERGOCALCIFEROL 1.25 MG/1
50000 CAPSULE ORAL WEEKLY
Qty: 5 CAPSULE | Refills: 0 | Status: SHIPPED | OUTPATIENT
Start: 2025-04-20 | End: 2025-06-30

## 2025-04-16 RX ORDER — LINEZOLID 600 MG/1
600 TABLET, FILM COATED ORAL EVERY 12 HOURS SCHEDULED
Qty: 10 TABLET | Refills: 0 | Status: SHIPPED | OUTPATIENT
Start: 2025-04-16 | End: 2025-04-21

## 2025-04-16 RX ORDER — OXYCODONE HYDROCHLORIDE 5 MG/1
5 TABLET ORAL EVERY 4 HOURS PRN
Qty: 12 TABLET | Refills: 0 | Status: SHIPPED | OUTPATIENT
Start: 2025-04-16 | End: 2025-04-19

## 2025-04-16 RX ORDER — POTASSIUM CHLORIDE 1500 MG/1
40 TABLET, EXTENDED RELEASE ORAL ONCE
Status: COMPLETED | OUTPATIENT
Start: 2025-04-16 | End: 2025-04-16

## 2025-04-16 RX ADMIN — PANTOPRAZOLE SODIUM 40 MG: 40 TABLET, DELAYED RELEASE ORAL at 06:14

## 2025-04-16 RX ADMIN — SODIUM CHLORIDE, PRESERVATIVE FREE 10 ML: 5 INJECTION INTRAVENOUS at 08:58

## 2025-04-16 RX ADMIN — POTASSIUM CHLORIDE 40 MEQ: 1500 TABLET, EXTENDED RELEASE ORAL at 14:25

## 2025-04-16 RX ADMIN — VITAM B12 50 MCG: 100 TAB at 08:58

## 2025-04-16 RX ADMIN — ACETAMINOPHEN 650 MG: 325 TABLET ORAL at 09:01

## 2025-04-16 RX ADMIN — BUDESONIDE AND FORMOTEROL FUMARATE DIHYDRATE 2 PUFF: 80; 4.5 AEROSOL RESPIRATORY (INHALATION) at 08:42

## 2025-04-16 RX ADMIN — LINEZOLID 600 MG: 600 TABLET, FILM COATED ORAL at 08:57

## 2025-04-16 RX ADMIN — CALCIUM CARBONATE-CHOLECALCIFEROL TAB 250 MG-125 UNIT 1 TABLET: 250-125 TAB at 08:57

## 2025-04-16 RX ADMIN — LEVOTHYROXINE SODIUM 75 MCG: 75 TABLET ORAL at 06:14

## 2025-04-16 RX ADMIN — ROSUVASTATIN CALCIUM 20 MG: 20 TABLET, FILM COATED ORAL at 08:57

## 2025-04-16 ASSESSMENT — PAIN DESCRIPTION - DESCRIPTORS
DESCRIPTORS: ACHING
DESCRIPTORS: ACHING

## 2025-04-16 ASSESSMENT — PAIN SCALES - GENERAL
PAINLEVEL_OUTOF10: 5
PAINLEVEL_OUTOF10: 5
PAINLEVEL_OUTOF10: 0

## 2025-04-16 ASSESSMENT — PAIN DESCRIPTION - LOCATION
LOCATION: ARM;HEAD
LOCATION: HEAD;ARM

## 2025-04-16 NOTE — PLAN OF CARE
Problem: Chronic Conditions and Co-morbidities  Goal: Patient's chronic conditions and co-morbidity symptoms are monitored and maintained or improved  Outcome: Progressing     Problem: Discharge Planning  Goal: Discharge to home or other facility with appropriate resources  Outcome: Progressing     Problem: Skin/Tissue Integrity  Goal: Skin integrity remains intact  Description: 1.  Monitor for areas of redness and/or skin breakdown  2.  Assess vascular access sites hourly  3.  Every 4-6 hours minimum:  Change oxygen saturation probe site  4.  Every 4-6 hours:  If on nasal continuous positive airway pressure, respiratory therapy assess nares and determine need for appliance change or resting period  Outcome: Progressing

## 2025-04-16 NOTE — PLAN OF CARE
Problem: Chronic Conditions and Co-morbidities  Goal: Patient's chronic conditions and co-morbidity symptoms are monitored and maintained or improved  4/16/2025 1532 by Cheo Terry RN  Outcome: Adequate for Discharge  4/16/2025 0653 by Cleo Najera RN  Outcome: Progressing     Problem: Discharge Planning  Goal: Discharge to home or other facility with appropriate resources  4/16/2025 1532 by Cheo Terry RN  Outcome: Adequate for Discharge  4/16/2025 0653 by Cleo Najera RN  Outcome: Progressing     Problem: Skin/Tissue Integrity  Goal: Skin integrity remains intact  Description: 1.  Monitor for areas of redness and/or skin breakdown  2.  Assess vascular access sites hourly  3.  Every 4-6 hours minimum:  Change oxygen saturation probe site  4.  Every 4-6 hours:  If on nasal continuous positive airway pressure, respiratory therapy assess nares and determine need for appliance change or resting period  4/16/2025 1532 by Cheo Terry RN  Outcome: Adequate for Discharge  4/16/2025 0653 by Cleo Najera RN  Outcome: Progressing     Problem: Safety - Adult  Goal: Free from fall injury  Outcome: Adequate for Discharge     Problem: Pain  Goal: Verbalizes/displays adequate comfort level or baseline comfort level  Outcome: Adequate for Discharge     Problem: Respiratory - Adult  Goal: Achieves optimal ventilation and oxygenation  4/16/2025 1532 by Cheo Terry RN  Outcome: Adequate for Discharge  Flowsheets (Taken 4/16/2025 0842 by Evelin Hernandez JORGE)  Achieves optimal ventilation and oxygenation:   Assess for changes in respiratory status   Assess for changes in mentation and behavior   Position to facilitate oxygenation and minimize respiratory effort   Oxygen supplementation based on oxygen saturation or arterial blood gases   Encourage broncho-pulmonary hygiene including cough, deep breathe, incentive spirometry   Assess the need for suctioning and aspirate as needed   Assess

## 2025-04-16 NOTE — PROGRESS NOTES
Pharmacy Note     Enoxaparin Dose Adjustment    Rica Enriquez is a 74 y.o. female. Pharmacist assessment of enoxaparin dose for VTE prophylaxis.    Recent Labs     04/09/25  1332   BUN 26*       Recent Labs     04/09/25  1332   CREATININE 2.1*       CrCl cannot be calculated (Unknown ideal weight.).  Estimated CrCl using Ideal Body Weight: 21 mL/min (based on IBW 57 kg)    Height:   Ht Readings from Last 1 Encounters:   03/27/24 1.651 m (5' 5\")     Weight:  Wt Readings from Last 1 Encounters:   04/09/25 91.6 kg (202 lb)       The following enoxaparin dose has been adjusted based upon renal function and/or patient weight per P&T Guidelines:             Enoxaparin 40 mg subcutaneously once daily changed to enoxaparin 30 mg subcutaneously once daily.    Lamonte Cabrera [facility]  4/9/2025 9:39 PM      
          Pharmacy Note     Renal Dose Adjustment    Rica Enriquez is a 74 y.o. female. Pharmacist assessment of renally cleared medications.    Recent Labs     04/09/25  1332   BUN 26*       Recent Labs     04/09/25  1332   CREATININE 2.1*       CrCl cannot be calculated (Unknown ideal weight.).  Estimated CrCl using Ideal Body Weight: 21 mL/min (based on IBW 57 kg)    Height:   Ht Readings from Last 1 Encounters:   03/27/24 1.651 m (5' 5\")     Weight:  Wt Readings from Last 1 Encounters:   04/09/25 91.6 kg (202 lb)       The following medication dose has been adjusted based upon renal function per P&T Guidelines:             Famotidine 20 mg IV twice daily changed to 20 mg once daily.    Lamonte Cabrera [facility]  4/9/2025 9:41 PM        
   04/11/25 1507   Care Plan - Respiratory Goals   Achieves optimal ventilation and oxygenation Assess for changes in respiratory status;Assess for changes in mentation and behavior;Position to facilitate oxygenation and minimize respiratory effort;Oxygen supplementation based on oxygen saturation or arterial blood gases;Encourage broncho-pulmonary hygiene including cough, deep breathe, incentive spirometry;Assess the need for suctioning and aspirate as needed;Assess and instruct to report shortness of breath or any respiratory difficulty;Respiratory therapy support as indicated       
   04/12/25 0808   Care Plan - Respiratory Goals   Achieves optimal ventilation and oxygenation Assess for changes in respiratory status;Assess for changes in mentation and behavior;Position to facilitate oxygenation and minimize respiratory effort;Oxygen supplementation based on oxygen saturation or arterial blood gases;Encourage broncho-pulmonary hygiene including cough, deep breathe, incentive spirometry;Assess the need for suctioning and aspirate as needed;Assess and instruct to report shortness of breath or any respiratory difficulty;Respiratory therapy support as indicated       
  Neurology Nurse Practitioner Progress Note      INTERVAL HISTORY: This is a 74 y.o.  female admitted 4/9/2025 for angioedema. This is a follow-up neurology progress note. The patient was examined and the chart was reviewed. Discussed with the pt & RN. There were no acute events overnight. No new motor, sensory, visual or bulbar symptoms. She stays A&O; ambulated 30' with RW with unsteady gait.    HPI: Rica Enriquez is a 74 y.o. 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  Patient reported she noted that initially her left side of lips were swollen and later left face was swollen to.  Swelling got progressively worse during the day.  She denied any trouble breathing, speaking or tongue swelling.  She has been taking lisinopril daily for a long time.  Recently she was taken off of insulin due to hypoglycemic episodes.  She was started on Mounjaro.  Patient presented to [facility] ED for evaluation.  She received 1 unit of FFP, on Decadron 10 mg, Benadryl 25 mg, epinephrine 0.3 mg and Pepcid 20 mg.  She reported improvement.  Patient was transferred to [facility] medical ICU on/9/25 for airway monitoring.      Neuro was consulted on 4/11/2025 due to history of frequent falls with ataxia.  Patient is a poor historian.  She reported multiple falls for \"some time\".  On average 1 fall per week.  However in September 2024 she fell 5 times in 1 week.  Reported using walker or a cane for ambulation.  No falls while using walker.  She reported feeling dizzy upon walking; son helps her walk to the bed and tuck-in almost every night when he is around.  She carries history of diabetic neuropathy pain syndrome for which she was on high-dose 
EEG completed  
INTENSIVE CARE UNIT  Resident Physician Progress Note    Patient - Rica Enriquez  Date of Admission -  4/9/2025  9:05 PM  Date of Evaluation -  4/10/2025  Room and Bed Number -  3015/3015-01   Hospital Day - 1    HPI:     Rica Enriquez is a 74 y.o. female presented as a transfer from [facility] due to concern of angioedema, likely secondary to lisinopril.  Patient has a past medical history significant for COPD on 3 L of oxygen via nasal cannula, CKD, type II DM, HLD, HTN, hypothyroidism, and major depressive disorder.  Patient states that she takes lisinopril and has taken it for several years.  She states that she has never had issues with lisinopril causing any swelling in the past.  She states that she recently started Mounjaro after being taken off of her insulin due to issues with hypoglycemia.  She started Mounjaro about 2 months ago and has had 7 total injections.  She states that this is the only other new medication that she has been started on recently.     Patient states that she presented to [facility] on 4/9 AM due to concern for swelling of the left side of her face and left upper lip.  She states that it progressively got mildly worse at home, so her son made her come into the ER.  She states that she did not have any trouble breathing, voice changes, shortness of breath or wheezing associated with the symptoms.  At [facility], patient was given 1 unit of FFP, 10 mg of Decadron, 25 mg of Benadryl, 0.3 mg of epinephrine, and 20 mg of Pepcid.  There was reported improvement in her swelling, but patient was transferred to [facility] ICU for higher level of care and airway monitoring.       SUBJECTIVE:         OVERNIGHT EVENTS:       No acute events overnight.  Patient states her swelling came down.    F.A.S.T. M. H.U.G.S. B.I.D.    Feeding Diet: Diet NPO Exceptions are: Sips of Water with Meds  Fluids: NS  Family: Updated  Analgesic: Acetaminophen  Sedation: 
Occupational Therapy  Occupational Therapy Daily Treatment Note  Facility/Department: [address] ORTHO/MED SURG   Patient Name: Rica Enriquez        MRN: [id number]    : 1950    Date of Service: 2025    No chief complaint on file.    Past Medical History:  has a past medical history of Chronic kidney disease, Cystic fibrosis, Diabetes mellitus (HCC), Family history of colon cancer, Fibromyalgia, History of colon polyps, History of colonic diverticulitis, Hyperlipidemia, Hypertension, Obesity (BMI 30-39.9), and Thyroid disease.  Past Surgical History:  has a past surgical history that includes Cholecystectomy; Hysterectomy;  section; Breast lumpectomy (Left); Colonoscopy (); pr neuroplasty &/transpos median nrv carpal tunne (Right, 2018); pr tendon sheath incision (Right, 2018); Colonoscopy (); and Colonoscopy (N/A, 2021).    Discharge Recommendations  Discharge Recommendations: Further therapy recommended at discharge.The patient should be able to tolerate at least 3 hours of therapy per day over 5 days or 15 hours over 7 days.   This patient may benefit from a Physical Medicine and Rehab consult.    OT Equipment Recommendations  Mobility Devices: Walker  Walker: Rolling    Assessment  Performance deficits / Impairments: Decreased functional mobility ;Decreased ADL status;Decreased safe awareness;Decreased cognition;Decreased endurance;Decreased balance;Decreased high-level IADLs  Prognosis: Good  Activity Tolerance  Activity Tolerance: Patient limited by fatigue;Patient limited by pain;Patient Tolerated treatment well  Activity Tolerance Comments: pt needs rest breaks with activity d/t fatigue and pain  Safety Devices  Type of Devices: Nurse notified;Left in chair;Gait belt;Patient at risk for falls;Call light within reach;Chair alarm in place    AM-PAC  AM-PAC Daily Activity - Inpatient   How much help is needed for putting on and taking off regular lower body 
Occupational Therapy  Occupational Therapy Daily Treatment Note  Facility/Department: [address] ORTHO/MED SURG   Patient Name: Rica Enriquez        MRN: [id number]    : 1950    Date of Service: 2025    No chief complaint on file.    Past Medical History:  has a past medical history of Chronic kidney disease, Cystic fibrosis, Diabetes mellitus (HCC), Family history of colon cancer, Fibromyalgia, History of colon polyps, History of colonic diverticulitis, Hyperlipidemia, Hypertension, Obesity (BMI 30-39.9), and Thyroid disease.  Past Surgical History:  has a past surgical history that includes Cholecystectomy; Hysterectomy;  section; Breast lumpectomy (Left); Colonoscopy (); pr neuroplasty &/transpos median nrv carpal tunne (Right, 2018); pr tendon sheath incision (Right, 2018); Colonoscopy (); and Colonoscopy (N/A, 2021).    Discharge Recommendations  Discharge Recommendations: Patient would benefit from continued therapy after discharge  OT Equipment Recommendations  Equipment Needed: Yes  Mobility Devices: Walker  Walker: Rolling  ADL Assistive Devices: Shower Chair with back;Reacher    Assessment  Performance deficits / Impairments: Decreased functional mobility ;Decreased ADL status;Decreased safe awareness;Decreased cognition;Decreased endurance;Decreased balance;Decreased high-level IADLs  Prognosis: Good  Decision Making: Medium Complexity  REQUIRES OT FOLLOW-UP: Yes  Activity Tolerance  Activity Tolerance: Patient Tolerated treatment well  Safety Devices  Type of Devices: Nurse notified;Gait belt;Call light within reach;Patient at risk for falls;Left in chair;Chair alarm in place  Restraints  Restraints Initially in Place: No    AM-PAC  AM-PAC Daily Activity - Inpatient   How much help is needed for putting on and taking off regular lower body clothing?: A Little  How much help is needed for bathing (which includes washing, rinsing, drying)?: A Little  How much 
Occupational Therapy  Occupational Therapy Initial Evaluation  Facility/Department: [address] ORTHO/MED SURG   Patient Name: Rica Enriquez        MRN: [id number]    : 1950    Date of Service: 2025    No chief complaint on file.    Past Medical History:  has a past medical history of Chronic kidney disease, Cystic fibrosis, Diabetes mellitus (HCC), Family history of colon cancer, Fibromyalgia, History of colon polyps, History of colonic diverticulitis, Hyperlipidemia, Hypertension, Obesity (BMI 30-39.9), and Thyroid disease.  Past Surgical History:  has a past surgical history that includes Cholecystectomy; Hysterectomy;  section; Breast lumpectomy (Left); Colonoscopy (); pr neuroplasty &/transpos median nrv carpal tunne (Right, 2018); pr tendon sheath incision (Right, 2018); Colonoscopy (); and Colonoscopy (N/A, 2021).    Discharge Recommendations  Discharge Recommendations: Patient would benefit from continued therapy after discharge  OT Equipment Recommendations  Equipment Needed: Yes  Mobility Devices: Walker  Walker: Rolling    Assessment  Performance deficits / Impairments: Decreased functional mobility ;Decreased ADL status;Decreased safe awareness;Decreased cognition;Decreased endurance;Decreased balance;Decreased high-level IADLs  Assessment: Pt agreed to occupational therapy evaluation with education provided on purpose and role of occupational therapy services in the acute care setting. OT facilitated assessing functional mobility and ADL performance to pt's tolerance this visit. Pt exhibited requiring CGA for functional sit<>stand transfers and CGA for functional mobility. Transfers/mobility completed utilizing RW. Pt completed sinkside grooming task w/ Mod IND for task and CGA for standing balance throughout task. Greatest limitations exhibited during these activities include decreased activity tolerance and balance. Activity completed on 3L of oxygen via NC 
Occupational Therapy Daily Treatment Note  Facility/Department: [address] ORTHO/MED SURG   Patient Name: Rica Enriquez        MRN: [id number]    : 1950    Date of Service: 4/15/2025    Past Medical History:  has a past medical history of Chronic kidney disease, Cystic fibrosis, Diabetes mellitus (HCC), Family history of colon cancer, Fibromyalgia, History of colon polyps, History of colonic diverticulitis, Hyperlipidemia, Hypertension, Obesity (BMI 30-39.9), and Thyroid disease.  Past Surgical History:  has a past surgical history that includes Cholecystectomy; Hysterectomy;  section; Breast lumpectomy (Left); Colonoscopy (); pr neuroplasty &/transpos median nrv carpal tunne (Right, 2018); pr tendon sheath incision (Right, 2018); Colonoscopy (); and Colonoscopy (N/A, 2021).    Discharge Recommendations  Discharge Recommendations: Patient would benefit from continued therapy after discharge  OT Equipment Recommendations  Equipment Needed: Yes  Mobility Devices: Walker  Walker: Rolling    Assessment  Performance deficits / Impairments: Decreased functional mobility ;Decreased ADL status;Decreased safe awareness;Decreased cognition;Decreased endurance;Decreased balance;Decreased high-level IADLs  Assessment: Pt seen for OT tx this date. Pt is progressing towards OT goals, however continued to demo decreased ADL IND secondary to deficits noted above. Pt is requiring MIN A for bed mobility, CGA for functional transfers and mobility, MIN A for LB ADLs, CGA for toileting, CGA for sinkside grooming in standing. Pt on 3L o2 throughout session with pt quick to fatigue, o2 sats WFL. Pt demos intermittent confusion, slight impulsivity, and decreased overall safety awareness during session with cues throughout. Pt would benefit from continued OT services while hospitalized and upon d/c to maximize IND and safety with functional tasks.  Prognosis: Good  Decision Making: Medium 
PATIENT REFUSES TO WEAR BIPAP     [x] Risks and benefits explained to patient   [x] Patient refuses to wear Bipap stating \"I'll stick with just the oxygen again tonight, it's more comfortable\"  [x] Patient verbalizes understanding of information presented.   
Physical Therapy        Physical Therapy Cancel Note      DATE: 2025    NAME: Rica Enriquez  MRN: [id number]   : 1950      Patient not seen this date for Physical Therapy due to:    Surgery/Procedure: Pt getting prepped for EEG then MRI in approx 1 hr.  Ck back       Electronically signed by CLAIR GUPTA PTA on 2025 at 1:37 PM      
Physical Therapy  Facility/Department: [address] ORTHO/MED SURG   Physical Therapy Initial Evaluation    Patient Name: Rica Enriquez        MRN: [id number]    : 1950    Date of Service: 2025    No chief complaint on file.    Past Medical History:  has a past medical history of Chronic kidney disease, Cystic fibrosis, Diabetes mellitus (HCC), Family history of colon cancer, Fibromyalgia, History of colon polyps, History of colonic diverticulitis, Hyperlipidemia, Hypertension, Obesity (BMI 30-39.9), and Thyroid disease.  Past Surgical History:  has a past surgical history that includes Cholecystectomy; Hysterectomy;  section; Breast lumpectomy (Left); Colonoscopy (); pr neuroplasty &/transpos median nrv carpal tunne (Right, 2018); pr tendon sheath incision (Right, 2018); Colonoscopy (); and Colonoscopy (N/A, 2021).    Discharge Recommendations  Discharge Recommendations: Patient would benefit from continued therapy after discharge       Assessment  Body Structures, Functions, Activity Limitations Requiring Skilled Therapeutic Intervention: Decreased functional mobility , Decreased strength, Decreased endurance, Decreased balance  Assessment: The pt ambulated 35 ft with a RW x CGA. She demonstrated increased unsteadiness intially. Her unsteadiness decreased with increased ambulation. She states that the unsteadiness comes and goes.She has a H/O recent falls. She could benefit from a continuation of PT for gait , strengthening and functional mobility prior to her DC  Therapy Prognosis: Good  Decision Making: Medium Complexity  Requires PT Follow-Up: Yes  Activity Tolerance  Activity Tolerance: Patient limited by fatigue, Patient limited by endurance  Safety Devices  Type of Devices: Nurse notified, Left in chair, Gait belt, Patient at risk for falls, Call light within reach, Chair alarm in place  Restraints  Restraints Initially in Place: No    AM-PAC  AM-PAC Basic Mobility 
Physical Therapy  Facility/Department: [address] ORTHO/MED SURG   Physical Therapy Daily Treatment Note    Patient Name: Rica Enriquez        MRN: [id number]    : 1950    Date of Service: 2025    Angioedema    Past Medical History:  has a past medical history of Chronic kidney disease, Cystic fibrosis, Diabetes mellitus (HCC), Family history of colon cancer, Fibromyalgia, History of colon polyps, History of colonic diverticulitis, Hyperlipidemia, Hypertension, Obesity (BMI 30-39.9), and Thyroid disease.  Past Surgical History:  has a past surgical history that includes Cholecystectomy; Hysterectomy;  section; Breast lumpectomy (Left); Colonoscopy (); pr neuroplasty &/transpos median nrv carpal tunne (Right, 2018); pr tendon sheath incision (Right, 2018); Colonoscopy (); and Colonoscopy (N/A, 2021).    Discharge Recommendations  Discharge Recommendations: Patient would benefit from continued therapy after discharge, Patient able to tolerate 3 hours of therapy per day  PT Equipment Recommendations  Equipment Needed: No    Assessment  Body Structures, Functions, Activity Limitations Requiring Skilled Therapeutic Intervention: Decreased functional mobility ;Decreased strength;Decreased endurance;Decreased balance  Assessment: Pt ambulated 30 ft X1 using RW with min-CGA.  She demo'd general unsteadiness, shaky throughout session that worsened by end of treatment. Recommend continued therapy during this admission addressing current strength and functional deficits.  Therapy Prognosis: Good  Decision Making: Medium Complexity  Activity Tolerance  Activity Tolerance: Patient limited by fatigue;Patient limited by endurance  Safety Devices  Type of Devices: Nurse notified;Left in chair;Gait belt;Patient at risk for falls;Call light within reach;Chair alarm in place    AM-PAC  AM-PAC Basic Mobility - Inpatient   How much help is needed turning from your back to your side while in a 
Physical Therapy  Facility/Department: [address] ORTHO/MED SURG   Physical Therapy Daily Treatment Note    Patient Name: Rica Enriquez        MRN: [id number]    : 1950    Date of Service: 2025    Past Medical History:  has a past medical history of Chronic kidney disease, Cystic fibrosis, Diabetes mellitus (HCC), Family history of colon cancer, Fibromyalgia, History of colon polyps, History of colonic diverticulitis, Hyperlipidemia, Hypertension, Obesity (BMI 30-39.9), and Thyroid disease.  Past Surgical History:  has a past surgical history that includes Cholecystectomy; Hysterectomy;  section; Breast lumpectomy (Left); Colonoscopy (); pr neuroplasty &/transpos median nrv carpal tunne (Right, 2018); pr tendon sheath incision (Right, 2018); Colonoscopy (); and Colonoscopy (N/A, 2021).    Discharge Recommendations  Discharge Recommendations: Patient would benefit from continued therapy after discharge  PT Equipment Recommendations  Equipment Needed: No    Assessment  Body Structures, Functions, Activity Limitations Requiring Skilled Therapeutic Intervention: Decreased functional mobility ;Decreased strength;Decreased endurance;Decreased balance  Assessment: Pt continues to be limited due to generalized weakness, poor endurance, and poor insight to deficits, ambulating 20ft x2 with RW CGA, requiring Agustín to perform bed mobility and functional transfers. Pt will require 24hr support upon discharge due to fall risk. Recommending continued skilled physical therapy to address these deficits and return pt to prior level of function.  Therapy Prognosis: Good  Decision Making: Medium Complexity  Requires PT Follow-Up: Yes  Activity Tolerance  Activity Tolerance: Patient limited by fatigue;Patient limited by endurance  Safety Devices  Type of Devices: Nurse notified;Gait belt;Call light within reach;Patient at risk for falls;Bed alarm in place;Left in bed    AM-PAC  AM-PAC Basic 
Pt transferred to room 0246 via stretch with all belongings. Vitals stable during trip.  
[facility]  Office: [phone]  Demond Cifuentes DO, Emanuel Chau DO, Alec Chris DO, Brian Lowry DO, Mario Ramos MD, Hillary Singh MD, Philippe Quinonez MD, Meghan Draper MD,  Ellis Liang MD, Aaron Sotelo MD, Krysta Virgen MD,  hSanell Levy DO, Jacinto Adams MD, Sanchez Franklin MD, Chase Cfiuentes DO, Naomi Arteaga MD,  Everette Bunch DO, Martha Valerio MD, Patricia Sood MD, Spring Yates MD,  Fili Thacker MD, Amie Santos MD, Kaylie Bullock MD, Ceci Lawson MD, Sincere Garcia MD, Gentry Saha MD, Lamonte Saxena DO, Miranda Rosario MD, Zeus Workman MD, Shanell Steward MD, Mohsin Reza, MD, Fina Gill MD, Shirley Waterhouse, CNP,  Marnie Fritz, CNP, Lamonte Moncada, CNP,  Randa Macias, DNP, Joanna Garcia, CNP, Cristine Valerio, CNP, Cristina Fields, CNP, Zaida Wall, CNP, Tanesha Barnett, PA-C, Chelsey Cline, CNP, Mary Beth Duran, CNP,  Romina Lr, CNP, Alexa Clement, CNP, Norberto Fisher, PA-C, Kia Hernandez, CNP,  Yuly Trujillo, CNS, Radha Riley, CNP, Zee Middleton, CNP,   Ciera Huff, CNP         [facility]   IN-PATIENT SERVICE   [facility]    Progress Note    4/12/2025    10:17 AM    Name:   Rica Enriquez  MRN:     [id number]     Acct:      [id number]   Room:   0246/0246-01  IP Day:  3  Admit Date:  4/9/2025  9:05 PM    PCP:   Huong Glasgow MD  Code Status:  Full Code    Subjective:     Patient is comfortable, no acute distress.  No fevers or chills.  No nausea vomiting or diarrhea.  Denies any history of coronary artery disease, stents, positive stress test, chest pain.  Tells me has been off of insulin for at least a month.  No history of chronic kidney disease.        Brief History:     This is a 74-year-old female with history of COPD on 3 L of oxygen, chronic kidney disease, diabetes, dyslipidemia, hypertension who presented to the hospital for evaluation of facial swelling thought to be angioedema.  
[facility]  Office: [phone]  Demond Cifuentes DO, Emanuel Chau DO, Alec Chris DO, Brian Lowry DO, Mario Ramos MD, Hillary Singh MD, Philippe Quinonez MD, Meghan Draper MD,  Ellis Liang MD, Aaron Sotelo MD, Krysta Virgen MD,  Shanell Levy DO, Jacinto Adams MD, Sanchez Franklin MD, Chase Cifuentes DO, Naomi Arteaga MD,  Everette Bunch DO, Martha Valerio MD, Patricia Sood MD, Spring Yates MD,  Fili Thacker MD, Amie Santos MD, Kaylie Bullock MD, Ceci Lawson MD, Sincere Garcia MD, Gentry Saha MD, Lamonte Saxena DO, Miranda Rosario MD, Zeus Workman MD, Shanell Steward MD, Mohsin Reza, MD, Fina Gill MD, Shirley Waterhouse, CNP,  Marnie Fritz, CNP, Lamonte Moncada, CNP,  Randa Macias, DNP, Joanna Garcia, CNP, Cristine Valerio, CNP, Cristina Fields, CNP, Zaida Wall, CNP, Tanesha Barnett, PA-C, Chelsey Cline, CNP, Mary Beth Duran, CNP,  Romina Lr, CNP, Alexa Clement, CNP, Norberto Fisher, PA-C, Kia Hernandez, CNP,  Yuly Trujillo, CNS, Radha Riley, CNP, Zee Middleton, CNP,   Ciera Huff, CNP         [facility]   IN-PATIENT SERVICE   [facility]    Progress Note    4/15/2025    12:36 PM    Name:   Rica Enriquez  MRN:     [id number]     Acct:      [id number]   Room:   0246/0246-01  IP Day:  6  Admit Date:  4/9/2025  9:05 PM    PCP:   Huong Glasgow MD  Code Status:  Full Code    Subjective:     C/C: No chief complaint on file.    Interval History Status: improved.     Seen and examined, reports headache, reports that swelling and pain in her left arm improved today  Lab vital sign consultant notes reviewed    Brief History:     74-year-old female with history of COPD on 3 L of oxygen, chronic kidney disease, diabetes, dyslipidemia, hypertension who presented to the hospital for evaluation of facial swelling thought to be angioedema. Patient was initially transferred to the ICU for airway monitoring. Did not 
[facility]  Office: [phone]  Demond Cifuentes DO, Emanuel Chau DO, Alec Chris DO, Brian Lowry DO, Mario Ramos MD, Hillary Singh MD, Philippe Quinonez MD, Meghan Draper MD,  Ellis Liang MD, Aaron Sotelo MD, Krysta Virgen MD,  Shanell Levy DO, Jacinto Adams MD, Sanchez Franklin MD, Chase Cifuentes DO, Naomi Arteaga MD,  Everette Bunch DO, Martha Valerio MD, Patricia Sood MD, Spring Yates MD,  Fili Thacker MD, Amie Santos MD, Kaylie Bullock MD, Ceci Lawson MD, Sincere Garcia MD, Gentry Saha MD, Lamonte Saxena DO, Miranda Rosario MD, Zeus Workman MD, Shanell Steward MD, Mohsin Reza, MD, Fina Gill MD, Shirley Waterhouse, CNP,  Marnie Fritz, CNP, Lamonte Moncada, CNP,  Randa Macias, DNP, Joanna Garcia, CNP, Cristine Valerio, CNP, Cristina Fields, CNP, Zaida Wall, CNP, Tanesha Barnett, PA-C, Chelsey Cline, CNP, Mary Beth Duran, CNP,  Romina Lr, CNP, Alexa Clement, CNP, Norberto Fisher, PA-C, Kia Hernandez, CNP,  Yuly Trujillo, CNS, Radha Riley, CNP, Zee Middleton, CNP,   Ciera Huff, CNP         [facility]   IN-PATIENT SERVICE   [facility]    Progress Note    4/14/2025    12:14 PM    Name:   Rica Enriquez  MRN:     [id number]     Acct:      [id number]   Room:   0246/0246-01  IP Day:  5  Admit Date:  4/9/2025  9:05 PM    PCP:   Huong Glasgow MD  Code Status:  Full Code    Subjective:   Headache improved today    Erythema in left arm is worse today compared to yesterday. Having more pain than usual. There is more swelling.       Brief History:     This is a 74-year-old female with history of COPD on 3 L of oxygen, chronic kidney disease, diabetes, dyslipidemia, hypertension who presented to the hospital for evaluation of facial swelling thought to be angioedema.  Patient was initially transferred to the ICU for airway monitoring.  Did not require intubation was transferred out of the ICU.  Patient did have 
    CT HEAD WO CONTRAST  Result Date: 4/11/2025  1. Subacute subdural versus epidural hematoma superficial to the right cerebral convexity.  No significant interval change. 2. Local mass effect with 4 mm subfalcine herniation from right to left.     MRI BRAIN WO CONTRAST  Addendum Date: 4/11/2025  ADDENDUM: Connected Levi Castillo NP with Dr. Macielompleted: Gee Rosalva,04/11/2025 6:49 PM     Result Date: 4/11/2025  1. Biconvex extra-axial hematoma may represent an epidural hematoma or expanding subdural hematoma measuring up to 21 mm in maximum thickness. There is 5 mm right to left midline shift. 2. Chronic microvascular ischemic changes.     MRA HEAD WO CONTRAST  Result Date: 4/11/2025  No acute abnormality or flow limiting stenosis of the major arteries of the head. Please see concurrent report of MRI brain for additional findings.       Physical Examination:     General appearance:  alert, cooperative and no distress  Mental Status:  oriented to person, place and time and normal affect  Lungs:  clear to auscultation bilaterally, normal effort  Heart:  regular rate and rhythm, no murmur  Abdomen:  soft, nontender, nondistended, normal bowel sounds, no masses, hepatomegaly, splenomegaly  Extremities:  no edema, redness, tenderness in the calves  Skin:  no gross lesions, rashes, induration    Assessment:     Hospital Problems           Last Modified POA    * (Principal) Traumatic subdural hemorrhage without loss of consciousness 4/12/2025 Yes    Angioedema, initial encounter 4/12/2025 Yes    Recurrent falls 4/11/2025 Yes    Angioedema of lips, initial encounter 4/9/2025 Yes    Diabetes mellitus type 2, noninsulin dependent (HCC) 4/12/2025 Yes    Essential hypertension 4/12/2025 Yes    Obesity (BMI 30-39.9) (Chronic) 4/12/2025 Yes    B12 deficiency 4/12/2025 Yes    PATI (obstructive sleep apnea) 4/12/2025 Yes    SDH (subdural hematoma) (HCC) 4/13/2025 Yes        Plan:       Recurrent falls with suspected traumatic 
Monocytes % 7 3 - 12 %    Eosinophils % 3 1 - 4 %    Basophils % 1 0 - 2 %    Immature Granulocytes % 1 (H) 0 %    Neutrophils Absolute 1.93 1.50 - 8.10 k/uL    Lymphocytes Absolute 2.24 1.10 - 3.70 k/uL    Monocytes Absolute 0.32 0.10 - 1.20 k/uL    Eosinophils Absolute 0.13 0.00 - 0.44 k/uL    Basophils Absolute 0.03 0.00 - 0.20 k/uL    Immature Granulocytes Absolute 0.03 0.00 - 0.30 k/uL   Protime-INR    Collection Time: 04/12/25  7:52 AM   Result Value Ref Range    Protime 14.7 11.7 - 14.9 sec    INR 1.1    TYPE AND SCREEN    Collection Time: 04/12/25  7:52 AM   Result Value Ref Range    Blood Bank Sample Expiration 04/15/2025,2359     Arm Band Number [id number]     ABO/Rh O POSITIVE     Antibody Screen NEGATIVE    Urinalysis    Collection Time: 04/12/25  7:59 AM   Result Value Ref Range    Color, UA Yellow Yellow    Turbidity UA Clear Clear    Glucose, Ur NEGATIVE NEGATIVE mg/dL    Bilirubin, Urine NEGATIVE NEGATIVE    Ketones, Urine NEGATIVE NEGATIVE mg/dL    Specific Gravity, UA 1.015 1.005 - 1.030    Urine Hgb NEGATIVE NEGATIVE    pH, Urine 5.0 5.0 - 8.0    Protein, UA TRACE (A) NEGATIVE mg/dL    Urobilinogen, Urine Normal 0.0 - 1.0 EU/dL    Nitrite, Urine NEGATIVE NEGATIVE    Leukocyte Esterase, Urine SMALL (A) NEGATIVE   Microscopic Urinalysis    Collection Time: 04/12/25  7:59 AM   Result Value Ref Range    WBC, UA 10 TO 20 0 - 5 /HPF    RBC, UA 0 TO 2 0 - 2 /HPF    Epithelial Cells, UA 0 TO 2 0 - 5 /HPF   POC Glucose Fingerstick    Collection Time: 04/12/25 12:04 PM   Result Value Ref Range    POC Glucose 91 65 - 105 mg/dL         Assessment :      Primary Problem  Traumatic subdural hemorrhage without loss of consciousness    Active Hospital Problems    Diagnosis Date Noted    Traumatic subdural hemorrhage without loss of consciousness [S06.5X0A] 04/12/2025    B12 deficiency [E53.8] 04/12/2025    PATI (obstructive sleep apnea) [G47.33] 04/12/2025    Recurrent falls [R29.6] 04/11/2025    Angioedema,
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
## 2025-04-16 NOTE — DISCHARGE SUMMARY
(obstructive sleep apnea)    SDH (subdural hematoma) (HCC)    Acute metabolic encephalopathy  Resolved Problems:    * No resolved hospital problems. *      Admission Condition:  fair     Discharged Condition: good    Hospital Stay:     Hospital Course:    74-year-old female with history of COPD on 3 L of oxygen, chronic kidney disease, diabetes, dyslipidemia, hypertension who presented to the hospital for evaluation of facial swelling thought to be angioedema. Patient was initially transferred to the ICU for airway monitoring. Did not require intubation was transferred out of the ICU. Patient did have recurrent falls at home so CT scan was ordered which showed possible subdural hematoma. She was seen by neurology and surgery,.  CT head 4-11 shows stable subdural hematoma, and final recommendation from neurosurgery team that no neurosurgical intervention needed and patient need to follow-up with 2 weeks with CT head,  Also during hospital course patient developed swelling in the left arm with redness and warmth that she started on antibiotic for cellulitis, ultrasound done which showed superficial thrombophlebitis, patient was seen and examined today at time of my evaluation reports swelling and redness in her arms much better, she denies any complaint and patient stable to be discharged today, discussed with RN and           Significant therapeutic interventions: as above     Significant Diagnostic Studies:   Labs / Micro:  CBC:   Lab Results   Component Value Date/Time    WBC 3.5 04/16/2025 12:30 PM    RBC 3.82 04/16/2025 12:30 PM    HGB 11.1 04/16/2025 12:30 PM    HCT 33.6 04/16/2025 12:30 PM    MCV 88.0 04/16/2025 12:30 PM    MCH 29.1 04/16/2025 12:30 PM    MCHC 33.0 04/16/2025 12:30 PM    RDW 13.2 04/16/2025 12:30 PM    PLT See Reflexed IPF Result 04/16/2025 12:30 PM     BMP:    Lab Results   Component Value Date/Time    GLUCOSE 136 04/13/2025 11:27 AM     04/13/2025 11:27 AM    K 3.7

## 2025-04-16 NOTE — CARE COORDINATION
Case Management   Daily Progress Note       Patient Name: Rica Enriquez                   YOB: 1950  Diagnosis: Angioedema [T78.3XXA]  Angioedema, initial encounter [T78.3XXA]  Angioedema of lips, initial encounter [T78.3XXA]                       GMLOS: 3.7 days  Length of Stay: 7  days    Anticipated Discharge Date: Ready for discharge    Readmission Risk (Low < 19, Mod (19-27), High > 27): Readmission Risk Score: 14.6        Current Transitional Plan    [] Home Independently    [] Home with HC    [] Skilled Nursing Facility    [x] Acute Rehabilitation    [] Long Term Acute Care (LTAC)    [] Other:     Plan for the Stay (Medical Management) : Ready for discharge.          Workflow Continuation (Additional Notes) : Called and spoke with Zoe at [facility], anticipate discharge today per Dr. Lawson, she states they are able to accept patient today.    Report # [phone] , room 212  Fax # [phone] Call from Jennifer at [facility], transport is set for 3:00 pm. Updated Zoe at [facility], pt and pt's RN.    1300 Faxed AVS to [facility].    Radha Mays RN  April 16, 2025

## 2025-04-16 NOTE — PLAN OF CARE
Problem: Respiratory - Adult  Goal: Achieves optimal ventilation and oxygenation  4/16/2025 0309 by Angelique Armijo RCP  Outcome: Progressing

## 2025-05-05 ENCOUNTER — HOSPITAL ENCOUNTER (EMERGENCY)
Age: 75
Discharge: ANOTHER HOSPITAL | End: 2025-05-06
Attending: EMERGENCY MEDICINE
Payer: MEDICARE

## 2025-05-05 ENCOUNTER — APPOINTMENT (OUTPATIENT)
Dept: CT IMAGING | Age: 75
End: 2025-05-05
Payer: MEDICARE

## 2025-05-05 DIAGNOSIS — S06.5XAA SUBACUTE SUBDURAL HEMATOMA (HCC): ICD-10-CM

## 2025-05-05 DIAGNOSIS — R56.9 NEW ONSET SEIZURE (HCC): Primary | ICD-10-CM

## 2025-05-05 LAB
ANION GAP SERPL CALCULATED.3IONS-SCNC: 16 MMOL/L (ref 9–16)
BACTERIA URNS QL MICRO: ABNORMAL
BASOPHILS # BLD: 0.05 K/UL (ref 0–0.2)
BASOPHILS NFR BLD: 1 % (ref 0–2)
BILIRUB UR QL STRIP: NEGATIVE
BUN SERPL-MCNC: 26 MG/DL (ref 8–23)
BUN/CREAT SERPL: 17 (ref 9–20)
CALCIUM SERPL-MCNC: 6.9 MG/DL (ref 8.6–10.4)
CHARACTER UR: ABNORMAL
CHLORIDE SERPL-SCNC: 101 MMOL/L (ref 98–107)
CLARITY UR: CLEAR
CO2 SERPL-SCNC: 25 MMOL/L (ref 20–31)
COLOR UR: YELLOW
CREAT SERPL-MCNC: 1.5 MG/DL (ref 0.5–0.9)
EKG ATRIAL RATE: 87 BPM
EKG P AXIS: 50 DEGREES
EKG P-R INTERVAL: 158 MS
EKG Q-T INTERVAL: 396 MS
EKG QRS DURATION: 82 MS
EKG QTC CALCULATION (BAZETT): 476 MS
EKG R AXIS: -17 DEGREES
EKG T AXIS: 41 DEGREES
EKG VENTRICULAR RATE: 87 BPM
EOSINOPHIL # BLD: 0.3 K/UL (ref 0–0.44)
EOSINOPHILS RELATIVE PERCENT: 5 % (ref 1–4)
EPI CELLS #/AREA URNS HPF: ABNORMAL /HPF (ref 0–25)
ERYTHROCYTE [DISTWIDTH] IN BLOOD BY AUTOMATED COUNT: 14 % (ref 11.8–14.4)
GFR, ESTIMATED: 38 ML/MIN/1.73M2
GLUCOSE SERPL-MCNC: 139 MG/DL (ref 74–99)
GLUCOSE UR STRIP-MCNC: NEGATIVE MG/DL
HCT VFR BLD AUTO: 38.7 % (ref 36.3–47.1)
HGB BLD-MCNC: 13.1 G/DL (ref 11.9–15.1)
HGB UR QL STRIP.AUTO: NEGATIVE
IMM GRANULOCYTES # BLD AUTO: 0.03 K/UL (ref 0–0.3)
IMM GRANULOCYTES NFR BLD: 1 %
KETONES UR STRIP-MCNC: NEGATIVE MG/DL
LEUKOCYTE ESTERASE UR QL STRIP: ABNORMAL
LYMPHOCYTES NFR BLD: 2.19 K/UL (ref 1.1–3.7)
LYMPHOCYTES RELATIVE PERCENT: 39 % (ref 24–43)
MCH RBC QN AUTO: 30 PG (ref 25.2–33.5)
MCHC RBC AUTO-ENTMCNC: 33.9 G/DL (ref 28.4–34.8)
MCV RBC AUTO: 88.8 FL (ref 82.6–102.9)
MONOCYTES NFR BLD: 0.33 K/UL (ref 0.1–1.2)
MONOCYTES NFR BLD: 6 % (ref 3–12)
NEUTROPHILS NFR BLD: 48 % (ref 36–65)
NEUTS SEG NFR BLD: 2.69 K/UL (ref 1.5–8.1)
NITRITE UR QL STRIP: NEGATIVE
NRBC BLD-RTO: 0 PER 100 WBC
PH UR STRIP: 6 [PH] (ref 5–9)
PLATELET # BLD AUTO: 172 K/UL (ref 138–453)
PMV BLD AUTO: 11.1 FL (ref 8.1–13.5)
POTASSIUM SERPL-SCNC: 3.4 MMOL/L (ref 3.7–5.3)
PROT UR STRIP-MCNC: NEGATIVE MG/DL
RBC # BLD AUTO: 4.36 M/UL (ref 3.95–5.11)
RBC #/AREA URNS HPF: ABNORMAL /HPF (ref 0–2)
SODIUM SERPL-SCNC: 142 MMOL/L (ref 136–145)
SP GR UR STRIP: 1.02 (ref 1.01–1.02)
TROPONIN I SERPL HS-MCNC: 15 NG/L (ref 0–14)
UROBILINOGEN UR STRIP-ACNC: NORMAL EU/DL (ref 0–1)
WBC #/AREA URNS HPF: ABNORMAL /HPF (ref 0–5)
WBC OTHER # BLD: 5.6 K/UL (ref 3.5–11.3)

## 2025-05-05 PROCEDURE — 96374 THER/PROPH/DIAG INJ IV PUSH: CPT

## 2025-05-05 PROCEDURE — 72125 CT NECK SPINE W/O DYE: CPT

## 2025-05-05 PROCEDURE — 99285 EMERGENCY DEPT VISIT HI MDM: CPT

## 2025-05-05 PROCEDURE — 70450 CT HEAD/BRAIN W/O DYE: CPT

## 2025-05-05 PROCEDURE — 87186 SC STD MICRODIL/AGAR DIL: CPT

## 2025-05-05 PROCEDURE — 86403 PARTICLE AGGLUT ANTBDY SCRN: CPT

## 2025-05-05 PROCEDURE — 80048 BASIC METABOLIC PNL TOTAL CA: CPT

## 2025-05-05 PROCEDURE — 84484 ASSAY OF TROPONIN QUANT: CPT

## 2025-05-05 PROCEDURE — 2580000003 HC RX 258: Performed by: PHYSICIAN ASSISTANT

## 2025-05-05 PROCEDURE — 81001 URINALYSIS AUTO W/SCOPE: CPT

## 2025-05-05 PROCEDURE — 87086 URINE CULTURE/COLONY COUNT: CPT

## 2025-05-05 PROCEDURE — 6370000000 HC RX 637 (ALT 250 FOR IP): Performed by: PHYSICIAN ASSISTANT

## 2025-05-05 PROCEDURE — 85025 COMPLETE CBC W/AUTO DIFF WBC: CPT

## 2025-05-05 PROCEDURE — 6360000002 HC RX W HCPCS: Performed by: PHYSICIAN ASSISTANT

## 2025-05-05 PROCEDURE — 93005 ELECTROCARDIOGRAM TRACING: CPT | Performed by: PHYSICIAN ASSISTANT

## 2025-05-05 PROCEDURE — 96376 TX/PRO/DX INJ SAME DRUG ADON: CPT

## 2025-05-05 PROCEDURE — 93010 ELECTROCARDIOGRAM REPORT: CPT | Performed by: INTERNAL MEDICINE

## 2025-05-05 PROCEDURE — 96361 HYDRATE IV INFUSION ADD-ON: CPT

## 2025-05-05 RX ORDER — LEVETIRACETAM 10 MG/ML
1000 INJECTION INTRAVASCULAR ONCE
Status: COMPLETED | OUTPATIENT
Start: 2025-05-05 | End: 2025-05-05

## 2025-05-05 RX ORDER — 0.9 % SODIUM CHLORIDE 0.9 %
1000 INTRAVENOUS SOLUTION INTRAVENOUS ONCE
Status: COMPLETED | OUTPATIENT
Start: 2025-05-05 | End: 2025-05-05

## 2025-05-05 RX ORDER — LEVETIRACETAM 5 MG/ML
500 INJECTION INTRAVASCULAR ONCE
Status: COMPLETED | OUTPATIENT
Start: 2025-05-05 | End: 2025-05-05

## 2025-05-05 RX ORDER — ACETAMINOPHEN 325 MG/1
650 TABLET ORAL ONCE
Status: COMPLETED | OUTPATIENT
Start: 2025-05-05 | End: 2025-05-05

## 2025-05-05 RX ADMIN — LEVETIRACETAM 1000 MG: 10 INJECTION, SOLUTION INTRAVENOUS at 17:33

## 2025-05-05 RX ADMIN — LEVETIRACETAM 500 MG: 5 INJECTION, SOLUTION INTRAVENOUS at 21:13

## 2025-05-05 RX ADMIN — ACETAMINOPHEN 650 MG: 325 TABLET ORAL at 20:07

## 2025-05-05 RX ADMIN — SODIUM CHLORIDE 1000 ML: 0.9 INJECTION, SOLUTION INTRAVENOUS at 17:31

## 2025-05-05 ASSESSMENT — ENCOUNTER SYMPTOMS
SHORTNESS OF BREATH: 0
COUGH: 0

## 2025-05-05 NOTE — ED NOTES
Oxygen desaturation noted on monitor.  Upon assessment patient is sleeping.  Pt wears oxygen HS at home, pt placed on 3L nasal cannula.  Dr. Fowler notified.

## 2025-05-05 NOTE — ED PROVIDER NOTES
EMERGENCY DEPARTMENT ENCOUNTER   ATTENDING ATTESTATION     Pt Name: Rica Enriquez  MRN: [id number]  Birthdate 1950  Date of evaluation: 5/5/25   Rica Enriquez is a 74 y.o. female with CC: Seizures (Patient lives alone. Family member was visiting who witnessed a seizure lasting approx. 90seconds then vomited. No hx of seizures, but per EMS patient has hematoma to brain. Postictal on arrival, intermittent confusion. 4 zofran administered by EMS. )    MDM:   I performed a substantive part of the MDM during the patient's E/M visit. I personally evaluated and examined the patient. I personally made or approved the documented management plan and acknowledge its risk of complications.    Independent Interpretation: My (EKG/X-Ray/US/CT) interpretation *    Discussion: Management/test interpretation discussed with *       CRITICAL CARE:       EKG: All EKG's are interpreted by the Emergency Department Physician who either signs or Co-signs this chart in the absence of a cardiologist.      RADIOLOGY:All plain film, CT, MRI, and formal ultrasound images (except ED bedside ultrasound) are read by the radiologist, see reports below, unless otherwise noted in MDM or here.  CT Head WO Contrast    (Results Pending)   CT CERVICAL SPINE WO CONTRAST    (Results Pending)     LABS: All lab results were reviewed by myself, and all abnormals are listed below.  Labs Reviewed   CBC WITH AUTO DIFFERENTIAL - Abnormal; Notable for the following components:       Result Value    Eosinophils % 5 (*)     Immature Granulocytes % 1 (*)     All other components within normal limits   BASIC METABOLIC PANEL - Abnormal; Notable for the following components:    Potassium 3.4 (*)     Glucose 139 (*)     BUN 26 (*)     Creatinine 1.5 (*)     Est, Glom Filt Rate 38 (*)     Calcium 6.9 (*)     All other components within normal limits   URINALYSIS WITH REFLEX TO CULTURE   TROPONIN     CONSULTS:  None  FINAL IMPRESSION    No diagnosis found.  
date: 2000     Years since quittin.3    Smokeless tobacco: Never   Vaping Use    Vaping status: Never Used   Substance Use Topics    Alcohol use: No    Drug use: No         PHYSICAL EXAM       ED Triage Vitals   BP Systolic BP Percentile Diastolic BP Percentile Temp Temp src Pulse Resp SpO2   -- -- -- -- -- -- -- --      Height Weight         -- --             Physical Exam  Constitutional:       Appearance: She is normal weight.   HENT:      Head:      Comments: Dried blood in mouth.  1 cm tongue laceration present     Mouth/Throat:      Mouth: Mucous membranes are dry.   Eyes:      Extraocular Movements: Extraocular movements intact.      Conjunctiva/sclera: Conjunctivae normal.      Pupils: Pupils are equal, round, and reactive to light.   Cardiovascular:      Rate and Rhythm: Normal rate and regular rhythm.   Pulmonary:      Effort: Pulmonary effort is normal.      Breath sounds: Normal breath sounds.   Abdominal:      Tenderness: There is no abdominal tenderness.   Neurological:      General: No focal deficit present.      Mental Status: She is alert. Mental status is at baseline.   Psychiatric:         Mood and Affect: Mood normal.         Behavior: Behavior normal.           DIAGNOSTIC RESULTS     EKG: Normal sinus rhythm, rate 87 bpm, on normal axis, nonspecific ST and T wave abnormality, no STEMI      Interpretation per the Radiologist below, if available at the time of this note:    CT Head WO Contrast   Final Result   1. Interval decrease in size, and decreased attenuation within the   extra-axial hematoma over the right cerebral convexity, most consistent with   a chronic subdural hematoma.   2. Interval decrease in mass effect   3. No new area of intraparenchymal, or extra-axial hemorrhage   4.  Critical results were called by Dr. George Gibbs to Mackenzie Belal on   2025 at 7:28 p.m. hours.         CT CERVICAL SPINE WO CONTRAST   Final Result   No acute fracture or traumatic malalignment of

## 2025-05-06 ENCOUNTER — HOSPITAL ENCOUNTER (INPATIENT)
Age: 75
LOS: 1 days | Discharge: HOME HEALTH | DRG: 065 | End: 2025-05-07
Attending: PSYCHIATRY & NEUROLOGY | Admitting: PSYCHIATRY & NEUROLOGY
Payer: MEDICARE

## 2025-05-06 VITALS
TEMPERATURE: 97.9 F | OXYGEN SATURATION: 96 % | HEART RATE: 89 BPM | RESPIRATION RATE: 18 BRPM | SYSTOLIC BLOOD PRESSURE: 108 MMHG | DIASTOLIC BLOOD PRESSURE: 77 MMHG

## 2025-05-06 DIAGNOSIS — E87.6 HYPOKALEMIA: Primary | ICD-10-CM

## 2025-05-06 DIAGNOSIS — E83.42 HYPOMAGNESEMIA: ICD-10-CM

## 2025-05-06 PROBLEM — R56.9 SEIZURES (HCC): Status: ACTIVE | Noted: 2025-05-06

## 2025-05-06 PROBLEM — R56.9 SEIZURE (HCC): Status: ACTIVE | Noted: 2025-05-06

## 2025-05-06 LAB
ANION GAP SERPL CALCULATED.3IONS-SCNC: 12 MMOL/L (ref 9–16)
CHLORIDE SERPL-SCNC: 105 MMOL/L (ref 98–107)
CO2 SERPL-SCNC: 22 MMOL/L (ref 20–31)
GLUCOSE BLD-MCNC: 132 MG/DL (ref 65–105)
GLUCOSE BLD-MCNC: 69 MG/DL (ref 65–105)
GLUCOSE BLD-MCNC: 75 MG/DL (ref 65–105)
GLUCOSE BLD-MCNC: 78 MG/DL (ref 65–105)
GLUCOSE BLD-MCNC: 88 MG/DL (ref 65–105)
GLUCOSE BLD-MCNC: 95 MG/DL (ref 65–105)
MAGNESIUM SERPL-MCNC: 0.7 MG/DL (ref 1.6–2.4)
MAGNESIUM SERPL-MCNC: 2.2 MG/DL (ref 1.6–2.4)
POTASSIUM SERPL-SCNC: 3.2 MMOL/L (ref 3.7–5.3)
SODIUM SERPL-SCNC: 139 MMOL/L (ref 136–145)
TROPONIN I SERPL HS-MCNC: 14 NG/L (ref 0–14)
TROPONIN I SERPL HS-MCNC: 19 NG/L (ref 0–14)

## 2025-05-06 PROCEDURE — 6370000000 HC RX 637 (ALT 250 FOR IP)

## 2025-05-06 PROCEDURE — 6360000002 HC RX W HCPCS

## 2025-05-06 PROCEDURE — 84484 ASSAY OF TROPONIN QUANT: CPT

## 2025-05-06 PROCEDURE — 2060000000 HC ICU INTERMEDIATE R&B

## 2025-05-06 PROCEDURE — 93005 ELECTROCARDIOGRAM TRACING: CPT

## 2025-05-06 PROCEDURE — 2500000003 HC RX 250 WO HCPCS

## 2025-05-06 PROCEDURE — 36415 COLL VENOUS BLD VENIPUNCTURE: CPT

## 2025-05-06 PROCEDURE — 82947 ASSAY GLUCOSE BLOOD QUANT: CPT

## 2025-05-06 PROCEDURE — 99222 1ST HOSP IP/OBS MODERATE 55: CPT | Performed by: PSYCHIATRY & NEUROLOGY

## 2025-05-06 PROCEDURE — 2580000003 HC RX 258

## 2025-05-06 PROCEDURE — 83735 ASSAY OF MAGNESIUM: CPT

## 2025-05-06 PROCEDURE — 92610 EVALUATE SWALLOWING FUNCTION: CPT

## 2025-05-06 PROCEDURE — 96375 TX/PRO/DX INJ NEW DRUG ADDON: CPT

## 2025-05-06 PROCEDURE — 80051 ELECTROLYTE PANEL: CPT

## 2025-05-06 PROCEDURE — 95816 EEG AWAKE AND DROWSY: CPT

## 2025-05-06 PROCEDURE — 92523 SPEECH SOUND LANG COMPREHEN: CPT

## 2025-05-06 RX ORDER — INSULIN LISPRO 100 [IU]/ML
0-8 INJECTION, SOLUTION INTRAVENOUS; SUBCUTANEOUS
Status: DISCONTINUED | OUTPATIENT
Start: 2025-05-06 | End: 2025-05-07 | Stop reason: HOSPADM

## 2025-05-06 RX ORDER — LEVOTHYROXINE SODIUM 75 UG/1
75 TABLET ORAL DAILY
Status: DISCONTINUED | OUTPATIENT
Start: 2025-05-06 | End: 2025-05-07 | Stop reason: HOSPADM

## 2025-05-06 RX ORDER — ACETAMINOPHEN 650 MG/1
650 SUPPOSITORY RECTAL EVERY 6 HOURS PRN
Status: DISCONTINUED | OUTPATIENT
Start: 2025-05-06 | End: 2025-05-07 | Stop reason: HOSPADM

## 2025-05-06 RX ORDER — 0.9 % SODIUM CHLORIDE 0.9 %
500 INTRAVENOUS SOLUTION INTRAVENOUS ONCE
Status: COMPLETED | OUTPATIENT
Start: 2025-05-06 | End: 2025-05-06

## 2025-05-06 RX ORDER — ROSUVASTATIN CALCIUM 20 MG/1
20 TABLET, COATED ORAL DAILY
Status: DISCONTINUED | OUTPATIENT
Start: 2025-05-06 | End: 2025-05-07 | Stop reason: HOSPADM

## 2025-05-06 RX ORDER — ENOXAPARIN SODIUM 100 MG/ML
40 INJECTION SUBCUTANEOUS DAILY
Status: DISCONTINUED | OUTPATIENT
Start: 2025-05-06 | End: 2025-05-07 | Stop reason: HOSPADM

## 2025-05-06 RX ORDER — GLUCAGON 1 MG/ML
1 KIT INJECTION PRN
Status: DISCONTINUED | OUTPATIENT
Start: 2025-05-06 | End: 2025-05-07 | Stop reason: HOSPADM

## 2025-05-06 RX ORDER — POTASSIUM CHLORIDE 1500 MG/1
40 TABLET, EXTENDED RELEASE ORAL PRN
Status: DISCONTINUED | OUTPATIENT
Start: 2025-05-06 | End: 2025-05-07 | Stop reason: HOSPADM

## 2025-05-06 RX ORDER — TIRZEPATIDE 2.5 MG/.5ML
2.5 INJECTION, SOLUTION SUBCUTANEOUS WEEKLY
COMMUNITY

## 2025-05-06 RX ORDER — MORPHINE SULFATE 4 MG/ML
4 INJECTION, SOLUTION INTRAMUSCULAR; INTRAVENOUS ONCE
Refills: 0 | Status: COMPLETED | OUTPATIENT
Start: 2025-05-06 | End: 2025-05-06

## 2025-05-06 RX ORDER — CYCLOBENZAPRINE HCL 10 MG
10 TABLET ORAL 3 TIMES DAILY PRN
Status: DISCONTINUED | OUTPATIENT
Start: 2025-05-06 | End: 2025-05-07 | Stop reason: HOSPADM

## 2025-05-06 RX ORDER — ONDANSETRON 2 MG/ML
4 INJECTION INTRAMUSCULAR; INTRAVENOUS EVERY 6 HOURS PRN
Status: DISCONTINUED | OUTPATIENT
Start: 2025-05-06 | End: 2025-05-07 | Stop reason: HOSPADM

## 2025-05-06 RX ORDER — LORAZEPAM 2 MG/ML
4 INJECTION INTRAMUSCULAR EVERY 5 MIN PRN
Status: DISCONTINUED | OUTPATIENT
Start: 2025-05-06 | End: 2025-05-07 | Stop reason: HOSPADM

## 2025-05-06 RX ORDER — ALBUTEROL SULFATE 0.83 MG/ML
2.5 SOLUTION RESPIRATORY (INHALATION) EVERY 6 HOURS PRN
Status: DISCONTINUED | OUTPATIENT
Start: 2025-05-06 | End: 2025-05-07 | Stop reason: HOSPADM

## 2025-05-06 RX ORDER — CYCLOBENZAPRINE HCL 10 MG
20 TABLET ORAL 3 TIMES DAILY PRN
Status: DISCONTINUED | OUTPATIENT
Start: 2025-05-06 | End: 2025-05-06

## 2025-05-06 RX ORDER — OXYCODONE HYDROCHLORIDE 5 MG/1
5 TABLET ORAL ONCE
Refills: 0 | Status: COMPLETED | OUTPATIENT
Start: 2025-05-06 | End: 2025-05-06

## 2025-05-06 RX ORDER — POLYETHYLENE GLYCOL 3350 17 G/17G
17 POWDER, FOR SOLUTION ORAL DAILY PRN
Status: DISCONTINUED | OUTPATIENT
Start: 2025-05-06 | End: 2025-05-07 | Stop reason: HOSPADM

## 2025-05-06 RX ORDER — ONDANSETRON 4 MG/1
4 TABLET, ORALLY DISINTEGRATING ORAL EVERY 8 HOURS PRN
Status: DISCONTINUED | OUTPATIENT
Start: 2025-05-06 | End: 2025-05-07 | Stop reason: HOSPADM

## 2025-05-06 RX ORDER — BUPROPION HYDROCHLORIDE 150 MG/1
150 TABLET ORAL EVERY MORNING
COMMUNITY

## 2025-05-06 RX ORDER — LEVETIRACETAM 500 MG/1
500 TABLET ORAL 2 TIMES DAILY
Status: DISCONTINUED | OUTPATIENT
Start: 2025-05-06 | End: 2025-05-07 | Stop reason: HOSPADM

## 2025-05-06 RX ORDER — ESCITALOPRAM OXALATE 20 MG/1
20 TABLET ORAL DAILY
COMMUNITY

## 2025-05-06 RX ORDER — MAGNESIUM SULFATE IN WATER 40 MG/ML
2000 INJECTION, SOLUTION INTRAVENOUS PRN
Status: DISCONTINUED | OUTPATIENT
Start: 2025-05-06 | End: 2025-05-07 | Stop reason: HOSPADM

## 2025-05-06 RX ORDER — ONDANSETRON 2 MG/ML
4 INJECTION INTRAMUSCULAR; INTRAVENOUS ONCE
Status: COMPLETED | OUTPATIENT
Start: 2025-05-06 | End: 2025-05-06

## 2025-05-06 RX ORDER — ESCITALOPRAM OXALATE 10 MG/1
20 TABLET ORAL DAILY
Status: DISCONTINUED | OUTPATIENT
Start: 2025-05-06 | End: 2025-05-07 | Stop reason: HOSPADM

## 2025-05-06 RX ORDER — UBIDECARENONE 75 MG
50 CAPSULE ORAL DAILY
Status: DISCONTINUED | OUTPATIENT
Start: 2025-05-06 | End: 2025-05-07 | Stop reason: HOSPADM

## 2025-05-06 RX ORDER — ACETAMINOPHEN 325 MG/1
650 TABLET ORAL EVERY 6 HOURS PRN
Status: DISCONTINUED | OUTPATIENT
Start: 2025-05-06 | End: 2025-05-07 | Stop reason: HOSPADM

## 2025-05-06 RX ORDER — ROPINIROLE 1 MG/1
1 TABLET, FILM COATED ORAL NIGHTLY
Status: DISCONTINUED | OUTPATIENT
Start: 2025-05-06 | End: 2025-05-07 | Stop reason: HOSPADM

## 2025-05-06 RX ORDER — SODIUM CHLORIDE 0.9 % (FLUSH) 0.9 %
5-40 SYRINGE (ML) INJECTION PRN
Status: DISCONTINUED | OUTPATIENT
Start: 2025-05-06 | End: 2025-05-07 | Stop reason: HOSPADM

## 2025-05-06 RX ORDER — TRIAMTERENE AND HYDROCHLOROTHIAZIDE 37.5; 25 MG/1; MG/1
1 TABLET ORAL DAILY
Status: DISCONTINUED | OUTPATIENT
Start: 2025-05-06 | End: 2025-05-07 | Stop reason: HOSPADM

## 2025-05-06 RX ORDER — SODIUM CHLORIDE 9 MG/ML
INJECTION, SOLUTION INTRAVENOUS PRN
Status: DISCONTINUED | OUTPATIENT
Start: 2025-05-06 | End: 2025-05-07 | Stop reason: HOSPADM

## 2025-05-06 RX ORDER — SODIUM CHLORIDE 0.9 % (FLUSH) 0.9 %
5-40 SYRINGE (ML) INJECTION EVERY 12 HOURS SCHEDULED
Status: DISCONTINUED | OUTPATIENT
Start: 2025-05-06 | End: 2025-05-07 | Stop reason: HOSPADM

## 2025-05-06 RX ORDER — DEXTROSE MONOHYDRATE 100 MG/ML
INJECTION, SOLUTION INTRAVENOUS CONTINUOUS PRN
Status: DISCONTINUED | OUTPATIENT
Start: 2025-05-06 | End: 2025-05-07 | Stop reason: HOSPADM

## 2025-05-06 RX ORDER — PANTOPRAZOLE SODIUM 40 MG/1
40 TABLET, DELAYED RELEASE ORAL
Status: DISCONTINUED | OUTPATIENT
Start: 2025-05-06 | End: 2025-05-07 | Stop reason: HOSPADM

## 2025-05-06 RX ORDER — POTASSIUM CHLORIDE 7.45 MG/ML
10 INJECTION INTRAVENOUS PRN
Status: DISCONTINUED | OUTPATIENT
Start: 2025-05-06 | End: 2025-05-07 | Stop reason: HOSPADM

## 2025-05-06 RX ADMIN — OXYCODONE 5 MG: 5 TABLET ORAL at 21:42

## 2025-05-06 RX ADMIN — ESCITALOPRAM OXALATE 20 MG: 10 TABLET ORAL at 13:44

## 2025-05-06 RX ADMIN — ONDANSETRON 4 MG: 2 INJECTION, SOLUTION INTRAMUSCULAR; INTRAVENOUS at 01:32

## 2025-05-06 RX ADMIN — SODIUM CHLORIDE 500 ML: 0.9 INJECTION, SOLUTION INTRAVENOUS at 11:02

## 2025-05-06 RX ADMIN — PANTOPRAZOLE SODIUM 40 MG: 40 TABLET, DELAYED RELEASE ORAL at 17:18

## 2025-05-06 RX ADMIN — CEFTRIAXONE SODIUM 1000 MG: 1 INJECTION, POWDER, FOR SOLUTION INTRAMUSCULAR; INTRAVENOUS at 11:03

## 2025-05-06 RX ADMIN — VITAM B12 50 MCG: 100 TAB at 08:10

## 2025-05-06 RX ADMIN — ROPINIROLE HYDROCHLORIDE 1 MG: 1 TABLET, FILM COATED ORAL at 20:46

## 2025-05-06 RX ADMIN — ACETAMINOPHEN 650 MG: 325 TABLET ORAL at 20:46

## 2025-05-06 RX ADMIN — MAGNESIUM SULFATE HEPTAHYDRATE 2000 MG: 40 INJECTION, SOLUTION INTRAVENOUS at 17:39

## 2025-05-06 RX ADMIN — ROSUVASTATIN CALCIUM 20 MG: 20 TABLET, FILM COATED ORAL at 20:46

## 2025-05-06 RX ADMIN — MORPHINE SULFATE 4 MG: 4 INJECTION, SOLUTION INTRAMUSCULAR; INTRAVENOUS at 01:32

## 2025-05-06 RX ADMIN — ACETAMINOPHEN 650 MG: 325 TABLET ORAL at 08:22

## 2025-05-06 RX ADMIN — LEVETIRACETAM 500 MG: 500 TABLET, FILM COATED ORAL at 20:46

## 2025-05-06 RX ADMIN — LEVETIRACETAM 500 MG: 500 TABLET, FILM COATED ORAL at 08:09

## 2025-05-06 RX ADMIN — PANTOPRAZOLE SODIUM 40 MG: 40 TABLET, DELAYED RELEASE ORAL at 08:09

## 2025-05-06 RX ADMIN — SODIUM CHLORIDE, PRESERVATIVE FREE 10 ML: 5 INJECTION INTRAVENOUS at 08:23

## 2025-05-06 RX ADMIN — LEVOTHYROXINE SODIUM 75 MCG: 75 TABLET ORAL at 08:09

## 2025-05-06 RX ADMIN — ENOXAPARIN SODIUM 40 MG: 100 INJECTION SUBCUTANEOUS at 08:09

## 2025-05-06 RX ADMIN — MAGNESIUM SULFATE HEPTAHYDRATE 2000 MG: 40 INJECTION, SOLUTION INTRAVENOUS at 19:58

## 2025-05-06 RX ADMIN — METOPROLOL SUCCINATE 150 MG: 50 TABLET, EXTENDED RELEASE ORAL at 20:46

## 2025-05-06 ASSESSMENT — PAIN SCALES - GENERAL
PAINLEVEL_OUTOF10: 0
PAINLEVEL_OUTOF10: 2
PAINLEVEL_OUTOF10: 8
PAINLEVEL_OUTOF10: 0
PAINLEVEL_OUTOF10: 0
PAINLEVEL_OUTOF10: 4
PAINLEVEL_OUTOF10: 0
PAINLEVEL_OUTOF10: 8

## 2025-05-06 ASSESSMENT — PAIN DESCRIPTION - LOCATION
LOCATION: HEAD

## 2025-05-06 ASSESSMENT — PAIN DESCRIPTION - ORIENTATION
ORIENTATION: ANTERIOR

## 2025-05-06 ASSESSMENT — ENCOUNTER SYMPTOMS
NAUSEA: 0
VOMITING: 0
SORE THROAT: 0
ABDOMINAL PAIN: 0
SHORTNESS OF BREATH: 0
COUGH: 0
RHINORRHEA: 0

## 2025-05-06 ASSESSMENT — PAIN DESCRIPTION - DESCRIPTORS
DESCRIPTORS: THROBBING
DESCRIPTORS: ACHING;THROBBING
DESCRIPTORS: ACHING

## 2025-05-06 ASSESSMENT — PAIN - FUNCTIONAL ASSESSMENT: PAIN_FUNCTIONAL_ASSESSMENT: PREVENTS OR INTERFERES SOME ACTIVE ACTIVITIES AND ADLS

## 2025-05-06 NOTE — PROGRESS NOTES
Attempted consult for emotional distress. Pt has been sleeping when writer has visited this morning. Nurse stated that pt has been very lethargic today. Spiritual health will attempt visit at later time when pt is more alert.    Electronically signed by Chaplain Fauzia, on 5/6/2025 at 12:55 PM.  [facility]  [facility]  [phone]

## 2025-05-06 NOTE — PLAN OF CARE
Problem: Chronic Conditions and Co-morbidities  Goal: Patient's chronic conditions and co-morbidity symptoms are monitored and maintained or improved  5/6/2025 1700 by Yarely Ken, RN  Outcome: Progressing  Flowsheets (Taken 5/6/2025 0800)  Care Plan - Patient's Chronic Conditions and Co-Morbidity Symptoms are Monitored and Maintained or Improved:   Monitor and assess patient's chronic conditions and comorbid symptoms for stability, deterioration, or improvement   Collaborate with multidisciplinary team to address chronic and comorbid conditions and prevent exacerbation or deterioration   Update acute care plan with appropriate goals if chronic or comorbid symptoms are exacerbated and prevent overall improvement and discharge  5/6/2025 0741 by Gautam Craig, RN  Outcome: Progressing     Problem: Discharge Planning  Goal: Discharge to home or other facility with appropriate resources  5/6/2025 1700 by Yarely Ken, RN  Outcome: Progressing  Flowsheets (Taken 5/6/2025 0800)  Discharge to home or other facility with appropriate resources:   Identify barriers to discharge with patient and caregiver   Arrange for needed discharge resources and transportation as appropriate   Identify discharge learning needs (meds, wound care, etc)   Refer to discharge planning if patient needs post-hospital services based on physician order or complex needs related to functional status, cognitive ability or social support system  5/6/2025 0741 by Gautam Craig, RN  Outcome: Progressing     Problem: Skin/Tissue Integrity  Goal: Skin integrity remains intact  Description: 1.  Monitor for areas of redness and/or skin breakdown2.  Assess vascular access sites hourly3.  Every 4-6 hours minimum:  Change oxygen saturation probe site4.  Every 4-6 hours:  If on nasal continuous positive airway pressure, respiratory therapy assess nares and determine need for appliance change or resting period  5/6/2025 1700 by Jesus Manuel

## 2025-05-06 NOTE — PROGRESS NOTES
Facility/Department: [address] STE[address]OWN   CLINICAL BEDSIDE SWALLOW EVALUATION    NAME: Rica Enriquez  : 1950  MRN: [id number]    ADMISSION DATE: 2025  ADMITTING DIAGNOSIS: has Left carpal tunnel syndrome; Diverticulitis; Colonic diverticular abscess; Diabetes mellitus type 2, noninsulin dependent (HCC); Essential hypertension; History of colon polyps; Obesity (BMI 30-39.9); Angioedema, initial encounter; Angioedema of lips, initial encounter; Recurrent falls; Traumatic subdural hemorrhage without loss of consciousness (HCC); B12 deficiency; PATI (obstructive sleep apnea); Subdural hematoma (HCC); Acute metabolic encephalopathy; Pain and swelling of left forearm; Seizures (HCC); and Seizure (HCC) on their problem list.    Date of Eval: 2025  Evaluating Therapist: MARC PALMER    Current Diet level:  Current Diet : Regular  Current Liquid Diet : Thin    Primary Complaint  Latrell Enriquez is a 75 yo female with a history of recent right sided subdural hematoma less likely epidural due to multiple falls, hypertension, hyperlipidemia, diabetes mellitus, retinopathy and neuropathy, angina, COPD, CKD, prolactinoma, vitamin B12 lucency, had a previous angioedema reaction to lisinopril presenting after noticing her arm moving funny for around 3 minutes: She noticed that her arm was moving rhythmically and without her control, she said it was jerking upwards and was concerning for her.  At no point did she lose consciousness or feel drowsy and she did not have an aura.  Patient is currently still at baseline and understands that her right sided SDH likely caused her to have a seizure after explaining it to her.  CT head showed that the subdural has become chronic with much of which resolved and a much smaller midline shift.  Will admit for seizure workup and likely will receive an EEG in the morning and will start patient on Keppra 500 mg twice a day, was loaded with keppra but not mentioned how much in

## 2025-05-06 NOTE — CARE COORDINATION
Case Management Assessment  Initial Evaluation    Date/Time of Evaluation: 5/6/2025 7:42 AM  Assessment Completed by: Willy Dhaliwal    If patient is discharged prior to next notation, then this note serves as note for discharge by case management.    Patient Name: Rica Enriquez                   YOB: 1950  Diagnosis: Seizure (HCC) [R56.9]  Subdural hematoma (HCC) [S06.5XAA]  Seizures (HCC) [R56.9]                   Date / Time: 5/6/2025  4:53 AM    Patient Admission Status: Inpatient   Readmission Risk (Low < 19, Mod (19-27), High > 27): Readmission Risk Score: 14.3    Current PCP: Huong Glasgow MD  PCP verified by CM? (P) Yes    Chart Reviewed: Yes      History Provided by: (P) Patient  Patient Orientation: (P) Alert and Oriented    Patient Cognition: (P) Alert    Hospitalization in the last 30 days (Readmission):  Yes    If yes, Readmission Assessment in CM Navigator will be completed.    Advance Directives:      Code Status: Full Code   Patient's Primary Decision Maker is: (P) Legal Next of Kin    Primary Decision Maker: Denton Wallace - Child - [phone]    Discharge Planning:    Patient lives with: (P) Children Type of Home: (P) House  Primary Care Giver: (P) Self  Patient Support Systems include: (P) Children, Family Members   Current Financial resources: (P) Medicare, Other (Comment)  Current community resources: (P) None  Current services prior to admission: (P) Durable Medical Equipment            Current DME: (P) Walker, Wheelchair            Type of Home Care services:  (P) None    ADLS  Prior functional level: (P) Independent in ADLs/IADLs  Current functional level: (P) Independent in ADLs/IADLs    PT AM-PAC:   /24  OT AM-PAC:   /24    Family can provide assistance at DC: (P) Yes  Would you like Case Management to discuss the discharge plan with any other family members/significant others, and if so, who? (P) Yes  Plans to Return to Present Housing: (P) Yes  Other  (P) Yes    Willy Dhaliwal  Case Management Department  Ph: [phone] Fax: [phone]

## 2025-05-06 NOTE — H&P
[facility] Neurology   IN-PATIENT SERVICE   [facility]    HISTORY AND PHYSICAL EXAMINATION            Date:   5/6/2025  Patient name:  Rica Enriquez  Date of admission:  5/6/2025  4:53 AM  MRN:   [id number]  Account:  [id number]  YOB: 1950  PCP:    Huong Glasgow MD  Room:   [address] [place] [address]  Code Status:    Full Code    Chief Complaint:     No chief complaint on file.      History Obtained From:     patient    History of Present Illness:     Latrell Enriquez is a 75 yo female with a history of recent right sided subdural hematoma less likely epidural due to multiple falls, hypertension, hyperlipidemia, diabetes mellitus, retinopathy and neuropathy, angina, COPD, CKD, prolactinoma, vitamin B12 lucency, had a previous angioedema reaction to lisinopril presenting after noticing her arm moving funny for around 3 minutes: She noticed that her arm was moving rhythmically and without her control, she said it was jerking upwards and was concerning for her.  At no point did she lose consciousness or feel drowsy and she did not have an aura.  Patient is currently still at baseline and understands that her right sided SDH likely caused her to have a seizure after explaining it to her.  CT head showed that the subdural has become chronic with much of which resolved and a much smaller midline shift.  Will admit for seizure workup and likely will receive an EEG in the morning and will start patient on Keppra 500 mg twice a day, was loaded with keppra but not mentioned how much in the intake.         Past Medical History:     Past Medical History:   Diagnosis Date    Chronic kidney disease     Cystic fibrosis (HCC)     Diabetes mellitus (HCC)     Family history of colon cancer     Fibromyalgia     History of colon polyps     History of colonic diverticulitis     Hyperlipidemia     Hypertension     Obesity (BMI 30-39.9)     Thyroid disease         Past Surgical History:     Past Surgical    * Monitor renal function and electrolytes     * Adjust medications as needed for renal dosing    8. History of Angioedema with Lisinopril       * Avoid ACE inhibitors     * Flag allergy in chart and alert care team    9. Hypothroidism      * levothyroxine 75mg daily restarted    10. T2DM     *Sldiing scale      *POC glucose checks      *hypoglycemia protocol    DVT prophylaxsis- Lovenox 30mg daily      Consultations:   None      Follow-up further recommendations after discussing the case with attending  The plan was discussed with the patient, patient's family and the medical staff.     Patient is admitted as inpatient status because of co-morbidities listed above, severity of signs and symptoms as outlined, requirement for current medical therapies and most importantly because of direct risk to patient if care not provided in a hospital setting.    Mandy Crowley MD  Neurology Resident PGY-3  5/6/2025  5:45 AM    Copy sent to Dr. Glasgow, Huong Adams MD

## 2025-05-06 NOTE — PROGRESS NOTES
Facility/Department: [address] STEPDOWN  Initial Speech/Language/Cognitive Assessment    NAME: Rica Enriquez  : 1950   MRN: [id number]  ADMISSION DATE: 2025  ADMITTING DIAGNOSIS: has Left carpal tunnel syndrome; Diverticulitis; Colonic diverticular abscess; Diabetes mellitus type 2, noninsulin dependent (HCC); Essential hypertension; History of colon polyps; Obesity (BMI 30-39.9); Angioedema, initial encounter; Angioedema of lips, initial encounter; Recurrent falls; Traumatic subdural hemorrhage without loss of consciousness (HCC); B12 deficiency; PAIT (obstructive sleep apnea); Subdural hematoma (HCC); Acute metabolic encephalopathy; Pain and swelling of left forearm; Seizures (HCC); and Seizure (HCC) on their problem list.    Date of Eval: 2025   Evaluating Therapist: MARC PALMER    Primary Complaint: Latrell Enriquez is a 75 yo female with a history of recent right sided subdural hematoma less likely epidural due to multiple falls, hypertension, hyperlipidemia, diabetes mellitus, retinopathy and neuropathy, angina, COPD, CKD, prolactinoma, vitamin B12 lucency, had a previous angioedema reaction to lisinopril presenting after noticing her arm moving funny for around 3 minutes: She noticed that her arm was moving rhythmically and without her control, she said it was jerking upwards and was concerning for her. At no point did she lose consciousness or feel drowsy and she did not have an aura. Patient is currently still at baseline and understands that her right sided SDH likely caused her to have a seizure after explaining it to her. CT head showed that the subdural has become chronic with much of which resolved and a much smaller midline shift. Will admit for seizure workup and likely will receive an EEG in the morning and will start patient on Keppra 500 mg twice a day, was loaded with keppra but not mentioned how much in the intake.     Pain:  Pain Assessment  Pain Assessment: 0-10  Pain  Level: 0    Vision/ Hearing  Vision  Vision: Within Functional Limits  Hearing  Hearing: Within functional limits    Assessment:  Pt presents with mild-moderate cognitive deficits characterized by difficulties with word associations, immediate recall of 5 units, inductive reasoning and deductive reasoning.   Pt. Presents with no dysarthria, no O/M deficits at this time. ST to follow up and provide treatment to address noted deficits. Education provided.      Recommendations:  Recommendations  Requires SLP Intervention: Yes  Patient Education: yes  Patient Education Response: Verbalizes understanding  Frequency of Treatment: 3-5 x week  D/C Recommendations: Ongoing speech therapy is recommended during this hospitalization       Plan:   Speech Therapy Prognosis  Prognosis: Fair  Individuals consulted  Consulted and agree with results and recommendations: Patient;RN    Goals:  Short Term Goals  Goal 1: Pt. will recall 4-5 units without distractions with 90% accuracy.  Goal 2: Pt. will utilize memory compensatory strategies to aid in recall.  Goal 3: Pt. will complete abstact and deductive reasoning tasks with 90% accuracy.   Patient/family involved in developing goals and treatment plan: yes    Subjective:    Social/Functional History  Lives With: Son  Active : No  Occupation: On disability  Vision  Vision: Within Functional Limits  Hearing  Hearing: Within functional limits     Objective:  Oral Motor   Labial: No impairment  Lingual: No impairment    Motor Speech  Apraxic Characteristics: None  Dysarthric Characteristics: None  Intelligibility: No impairment  Overall Impairment Severity: None    Expression  Primary Mode of Expression: Verbal    Cognition:      Orientation  Overall Orientation Status: Within Normal Limits  Attention  Attention: Within Functional Limits  Memory  Memory: Exceptions to WFL  Short-term Memory: WFL (1/3 increased to 3/3 with min verbal cues, 3/3)  Immediate Memory: Mild (3/3, 4/5,

## 2025-05-06 NOTE — CARE COORDINATION
05/06/25 0747   Readmission Assessment   Number of Days since last admission? 8-30 days   Previous Disposition Acute Rehab   Who is being Interviewed Patient   What was the patient's/caregiver's perception as to why they think they needed to return back to the hospital? Other (Comment)  (pt. continues to fall, had a seizure)   Did you visit your Primary Care Physician after you left the hospital, before you returned this time? No   Why weren't you able to visit your PCP? Other (Comment)  (has an appointment on 5/20)   Did you see a specialist, such as Cardiac, Pulmonary, Orthopedic Physician, etc. after you left the hospital? No   Who advised the patient to return to the hospital? Self-referral   Does the patient report anything that got in the way of taking their medications? No   In our efforts to provide the best possible care to you and others like you, can you think of anything that we could have done to help you after you left the hospital the first time, so that you might not have needed to return so soon? Other (Comment)  (Pt. states unrelated incident, -seizure)

## 2025-05-06 NOTE — ED NOTES
Tried numerous transports for an earlier ETA for this patient as well as others:  [facility]-no availability  [facility]-Nothing available, can try after 8 am  [facility]-Nothing available, can try after 8 am  [facility]- No ALS crew  [facility]-No ALS crew  [facility]-No availability  [facility]-No ALS  [facility]-nothing available in area  [facility]-No availability

## 2025-05-07 VITALS
OXYGEN SATURATION: 93 % | TEMPERATURE: 97.6 F | SYSTOLIC BLOOD PRESSURE: 117 MMHG | BODY MASS INDEX: 32.48 KG/M2 | HEART RATE: 74 BPM | DIASTOLIC BLOOD PRESSURE: 67 MMHG | RESPIRATION RATE: 15 BRPM | HEIGHT: 64 IN | WEIGHT: 190.26 LBS

## 2025-05-07 LAB
ANION GAP SERPL CALCULATED.3IONS-SCNC: 11 MMOL/L (ref 9–16)
CHLORIDE SERPL-SCNC: 108 MMOL/L (ref 98–107)
CO2 SERPL-SCNC: 22 MMOL/L (ref 20–31)
EKG ATRIAL RATE: 81 BPM
EKG P AXIS: 61 DEGREES
EKG P-R INTERVAL: 166 MS
EKG Q-T INTERVAL: 418 MS
EKG QRS DURATION: 84 MS
EKG QTC CALCULATION (BAZETT): 485 MS
EKG R AXIS: -6 DEGREES
EKG T AXIS: 32 DEGREES
EKG VENTRICULAR RATE: 81 BPM
GLUCOSE BLD-MCNC: 112 MG/DL (ref 65–105)
GLUCOSE BLD-MCNC: 84 MG/DL (ref 65–105)
MAGNESIUM SERPL-MCNC: 2.1 MG/DL (ref 1.6–2.4)
PHOSPHATE SERPL-MCNC: 3 MG/DL (ref 2.5–4.5)
POTASSIUM SERPL-SCNC: 4.4 MMOL/L (ref 3.7–5.3)
SODIUM SERPL-SCNC: 141 MMOL/L (ref 136–145)

## 2025-05-07 PROCEDURE — 2500000003 HC RX 250 WO HCPCS

## 2025-05-07 PROCEDURE — 97530 THERAPEUTIC ACTIVITIES: CPT

## 2025-05-07 PROCEDURE — 97166 OT EVAL MOD COMPLEX 45 MIN: CPT

## 2025-05-07 PROCEDURE — 82947 ASSAY GLUCOSE BLOOD QUANT: CPT

## 2025-05-07 PROCEDURE — 36415 COLL VENOUS BLD VENIPUNCTURE: CPT

## 2025-05-07 PROCEDURE — 2580000003 HC RX 258

## 2025-05-07 PROCEDURE — 6360000002 HC RX W HCPCS

## 2025-05-07 PROCEDURE — 95819 EEG AWAKE AND ASLEEP: CPT | Performed by: STUDENT IN AN ORGANIZED HEALTH CARE EDUCATION/TRAINING PROGRAM

## 2025-05-07 PROCEDURE — 84100 ASSAY OF PHOSPHORUS: CPT

## 2025-05-07 PROCEDURE — 6370000000 HC RX 637 (ALT 250 FOR IP)

## 2025-05-07 PROCEDURE — 97535 SELF CARE MNGMENT TRAINING: CPT

## 2025-05-07 PROCEDURE — 93010 ELECTROCARDIOGRAM REPORT: CPT | Performed by: INTERNAL MEDICINE

## 2025-05-07 PROCEDURE — 80051 ELECTROLYTE PANEL: CPT

## 2025-05-07 PROCEDURE — 99239 HOSP IP/OBS DSCHRG MGMT >30: CPT

## 2025-05-07 PROCEDURE — 92526 ORAL FUNCTION THERAPY: CPT

## 2025-05-07 PROCEDURE — 97129 THER IVNTJ 1ST 15 MIN: CPT

## 2025-05-07 PROCEDURE — 83735 ASSAY OF MAGNESIUM: CPT

## 2025-05-07 RX ORDER — DIPHENHYDRAMINE HYDROCHLORIDE 50 MG/ML
25 INJECTION, SOLUTION INTRAMUSCULAR; INTRAVENOUS ONCE
Status: COMPLETED | OUTPATIENT
Start: 2025-05-07 | End: 2025-05-07

## 2025-05-07 RX ORDER — MAGNESIUM SULFATE IN WATER 40 MG/ML
2000 INJECTION, SOLUTION INTRAVENOUS ONCE
Status: COMPLETED | OUTPATIENT
Start: 2025-05-07 | End: 2025-05-07

## 2025-05-07 RX ORDER — DEXAMETHASONE SODIUM PHOSPHATE 10 MG/ML
5 INJECTION, SOLUTION INTRA-ARTICULAR; INTRALESIONAL; INTRAMUSCULAR; INTRAVENOUS; SOFT TISSUE ONCE
Status: COMPLETED | OUTPATIENT
Start: 2025-05-07 | End: 2025-05-07

## 2025-05-07 RX ORDER — PROCHLORPERAZINE EDISYLATE 5 MG/ML
10 INJECTION INTRAMUSCULAR; INTRAVENOUS ONCE
Status: COMPLETED | OUTPATIENT
Start: 2025-05-07 | End: 2025-05-07

## 2025-05-07 RX ORDER — CEPHALEXIN 500 MG/1
500 CAPSULE ORAL 2 TIMES DAILY
Qty: 14 CAPSULE | Refills: 0 | Status: SHIPPED | OUTPATIENT
Start: 2025-05-07 | End: 2025-05-14

## 2025-05-07 RX ORDER — LEVETIRACETAM 500 MG/1
500 TABLET ORAL 2 TIMES DAILY
Qty: 60 TABLET | Refills: 3 | Status: SHIPPED | OUTPATIENT
Start: 2025-05-07

## 2025-05-07 RX ADMIN — POTASSIUM CHLORIDE 40 MEQ: 1500 TABLET, EXTENDED RELEASE ORAL at 08:46

## 2025-05-07 RX ADMIN — MAGNESIUM SULFATE HEPTAHYDRATE 2000 MG: 40 INJECTION, SOLUTION INTRAVENOUS at 09:48

## 2025-05-07 RX ADMIN — ENOXAPARIN SODIUM 40 MG: 100 INJECTION SUBCUTANEOUS at 08:47

## 2025-05-07 RX ADMIN — PROCHLORPERAZINE EDISYLATE 10 MG: 5 INJECTION INTRAMUSCULAR; INTRAVENOUS at 09:48

## 2025-05-07 RX ADMIN — TRIAMTERENE AND HYDROCHLOROTHIAZIDE 1 TABLET: 37.5; 25 TABLET ORAL at 08:47

## 2025-05-07 RX ADMIN — DEXAMETHASONE SODIUM PHOSPHATE 5 MG: 10 INJECTION INTRAMUSCULAR; INTRAVENOUS at 09:48

## 2025-05-07 RX ADMIN — PANTOPRAZOLE SODIUM 40 MG: 40 TABLET, DELAYED RELEASE ORAL at 08:47

## 2025-05-07 RX ADMIN — ESCITALOPRAM OXALATE 20 MG: 10 TABLET ORAL at 08:47

## 2025-05-07 RX ADMIN — DIPHENHYDRAMINE HYDROCHLORIDE 25 MG: 50 INJECTION INTRAMUSCULAR; INTRAVENOUS at 09:48

## 2025-05-07 RX ADMIN — SODIUM CHLORIDE: 0.9 INJECTION, SOLUTION INTRAVENOUS at 09:47

## 2025-05-07 RX ADMIN — LEVOTHYROXINE SODIUM 75 MCG: 75 TABLET ORAL at 08:47

## 2025-05-07 RX ADMIN — SODIUM CHLORIDE, PRESERVATIVE FREE 10 ML: 5 INJECTION INTRAVENOUS at 08:48

## 2025-05-07 RX ADMIN — CEFTRIAXONE SODIUM 1000 MG: 1 INJECTION, POWDER, FOR SOLUTION INTRAMUSCULAR; INTRAVENOUS at 08:46

## 2025-05-07 RX ADMIN — LEVETIRACETAM 500 MG: 500 TABLET, FILM COATED ORAL at 08:47

## 2025-05-07 RX ADMIN — VITAM B12 50 MCG: 100 TAB at 08:47

## 2025-05-07 ASSESSMENT — PAIN DESCRIPTION - ORIENTATION: ORIENTATION: ANTERIOR

## 2025-05-07 ASSESSMENT — PAIN - FUNCTIONAL ASSESSMENT: PAIN_FUNCTIONAL_ASSESSMENT: ACTIVITIES ARE NOT PREVENTED

## 2025-05-07 ASSESSMENT — PAIN SCALES - GENERAL
PAINLEVEL_OUTOF10: 0
PAINLEVEL_OUTOF10: 0
PAINLEVEL_OUTOF10: 5

## 2025-05-07 ASSESSMENT — PAIN DESCRIPTION - LOCATION: LOCATION: HEAD

## 2025-05-07 ASSESSMENT — PAIN DESCRIPTION - DESCRIPTORS: DESCRIPTORS: ACHING;THROBBING

## 2025-05-07 NOTE — CARE COORDINATION
Case Management   Daily Progress Note       Patient Name: Rica Enriquez                   YOB: 1950  Diagnosis: Seizure (HCC) [R56.9]  Subdural hematoma (HCC) [S06.5XAA]  Seizures (HCC) [R56.9]                       GMLOS: 2.8 days  Length of Stay: 1  days    Anticipated Discharge Date: Discharge pending    Readmission Risk (Low < 19, Mod (19-27), High > 27): Readmission Risk Score: 19.2        Current Transitional Plan    [] Home Independently    [x] Home with HC    [] Skilled Nursing Facility    [] Acute Rehabilitation    [] Long Term Acute Care (LTAC)    [] Other:     Plan for the Stay (Medical Management) :          Workflow Continuation (Additional Notes) :    Spoke to [facility] who confirmed they are current with the patient. They are able to resume services when she is discharged.     Willy Dhaliwal  May 7, 2025

## 2025-05-07 NOTE — PLAN OF CARE
Problem: Chronic Conditions and Co-morbidities  Goal: Patient's chronic conditions and co-morbidity symptoms are monitored and maintained or improved  5/7/2025 0514 by Janee Hernandez RN  Outcome: Progressing  5/6/2025 1700 by Yarely Ken RN  Outcome: Progressing  Flowsheets (Taken 5/6/2025 0800)  Care Plan - Patient's Chronic Conditions and Co-Morbidity Symptoms are Monitored and Maintained or Improved:   Monitor and assess patient's chronic conditions and comorbid symptoms for stability, deterioration, or improvement   Collaborate with multidisciplinary team to address chronic and comorbid conditions and prevent exacerbation or deterioration   Update acute care plan with appropriate goals if chronic or comorbid symptoms are exacerbated and prevent overall improvement and discharge     Problem: Discharge Planning  Goal: Discharge to home or other facility with appropriate resources  5/7/2025 0514 by Janee Hernandez RN  Outcome: Progressing  5/6/2025 1700 by Yarely Ken RN  Outcome: Progressing  Flowsheets (Taken 5/6/2025 0800)  Discharge to home or other facility with appropriate resources:   Identify barriers to discharge with patient and caregiver   Arrange for needed discharge resources and transportation as appropriate   Identify discharge learning needs (meds, wound care, etc)   Refer to discharge planning if patient needs post-hospital services based on physician order or complex needs related to functional status, cognitive ability or social support system     Problem: Skin/Tissue Integrity  Goal: Skin integrity remains intact  Description: 1.  Monitor for areas of redness and/or skin breakdown2.  Assess vascular access sites hourly3.  Every 4-6 hours minimum:  Change oxygen saturation probe site4.  Every 4-6 hours:  If on nasal continuous positive airway pressure, respiratory therapy assess nares and determine need for appliance change or resting period  5/7/2025 0514 by Janee Hernandez,

## 2025-05-07 NOTE — PROGRESS NOTES
Occupational Therapy  Occupational Therapy Initial Evaluation  Facility/Department: [facility] 1C STEPDOWN   Patient Name: Rica Enriquez        MRN: [id number]    : 1950    Date of Service: 2025    PER CHART \"Rica Enriquez is a 74 y.o. female who presents to the emergency department due to seizure.  Patient had a witnessed seizure today.  The seizure lasted about 90 seconds.  The patient convulsed and then vomited.  She bit her tongue during the seizure.  Patient has recently been diagnosed with a subdural hematoma from frequent falling.  No previous history of seizures.  She arrives to the ER mildly postictal.  She is in no pain.  There are no other complaints or concerns. \"    Past Medical History:  has a past medical history of Chronic kidney disease, Cystic fibrosis (HCC), Diabetes mellitus (HCC), Family history of colon cancer, Fibromyalgia, History of colon polyps, History of colonic diverticulitis, Hyperlipidemia, Hypertension, Obesity (BMI 30-39.9), and Thyroid disease.  Past Surgical History:  has a past surgical history that includes Cholecystectomy; Hysterectomy;  section; Breast lumpectomy (Left); Colonoscopy (); pr neuroplasty &/transpos median nrv carpal tunne (Right, 2018); pr tendon sheath incision (Right, 2018); Colonoscopy (); and Colonoscopy (N/A, 2021).    Discharge Recommendations  Discharge Recommendations: Patient would benefit from continued therapy after discharge  OT Equipment Recommendations  Equipment Needed: Yes  Mobility Devices: ADL Assistive Devices  ADL Assistive Devices: Shower Chair with back    Assessment  Performance deficits / Impairments: Decreased functional mobility ;Decreased ADL status;Decreased balance;Decreased high-level IADLs;Decreased endurance  Assessment: Pt agreed to occupational therapy evaluation with education provided on purpose and role of occupational therapy services in the acute care setting. OT facilitated

## 2025-05-07 NOTE — PROGRESS NOTES
[facility] Neurology   IN-PATIENT SERVICE   [facility]    Progress Note             Date:   5/7/2025  Patient name:  Rica Enriquez  Date of admission:  5/6/2025  4:53 AM  MRN:   [id number]  Account:  [id number]  YOB: 1950  PCP:    Huong Glasgow MD  Room:   [address] [place] [address]  Code Status:    Full Code    Chief Complaint:     Seizure-like activity      Interval hx:     The patient was seen and examined at bedside.   Is vitally stable, alert and oriented x 3. No acute events overnight.  No further rhythmic movements of the extremities  EEG: Normal  Potassium 3.2, replaced  Mg 0.7, replaced  Will likely discharge the patient today with Keppra 500 twice daily and Keflex for UTI  Discussed with patient if she wants to wean her Wellbutrin down.  She said that she has been on it for long time and will discuss with her psychiatrist about the alternatives.      Brief History of Present Illness:     73 yo female with a history of recent right sided subdural hematoma less likely epidural due to multiple falls, hypertension, hyperlipidemia, diabetes mellitus, retinopathy and neuropathy, angina, COPD, CKD, prolactinoma, vitamin B12 lucency, had a previous angioedema reaction to lisinopril presenting after noticing her arm moving funny for around 3 minutes: She noticed that her arm was moving rhythmically and without her control, she said it was jerking upwards and was concerning for her.  At no point did she lose consciousness or feel drowsy and she did not have an aura.  Patient is currently still at baseline and understands that her right sided SDH likely caused her to have a seizure after explaining it to her.  CT head showed that the subdural has become chronic with much of which resolved and a much smaller midline shift.  Will admit for seizure workup and likely will receive an EEG in the morning and will start patient on Keppra 500 mg twice a day, was loaded with keppra but not

## 2025-05-07 NOTE — PROGRESS NOTES
Speech Language Pathology  [facility]    Cognitive/Dysphagia Treatment Note    Date: 5/7/2025  Patient’s Name: Rica Enriquez  MRN: [id number]  Patient Active Problem List   Diagnosis Code    Left carpal tunnel syndrome G56.02    Diverticulitis K57.92    Colonic diverticular abscess K57.20    Diabetes mellitus type 2, noninsulin dependent (HCC) E11.9    Essential hypertension I10    History of colon polyps Z86.0100    Obesity (BMI 30-39.9) E66.9    Angioedema, initial encounter T78.3XXA    Angioedema of lips, initial encounter T78.3XXA    Recurrent falls R29.6    Traumatic subdural hemorrhage without loss of consciousness (HCC) S06.5X0A    B12 deficiency E53.8    PATI (obstructive sleep apnea) G47.33    Subdural hematoma (HCC) S06.5XAA    Acute metabolic encephalopathy G93.41    Pain and swelling of left forearm M79.632, M79.89    Seizures (HCC) R56.9    Seizure (HCC) R56.9       Pain: 0/10    Cognitive Treatment    Treatment time: 924-950      Subjective: [x] Alert [x] Cooperative     [] Confused     [] Agitated    [] Lethargic      Objective/Assessment:  Attention: Pt. Distractible during therapy session on this date.  Required re-directions back to tasks     Recall: Delayed recall of 3 units unrelated: 1/3 increased to 3/3 with min verbal cues    Memory and mental manipulation, reverse order: 6/10 increased to 7/10 with repetition     Pt. Provided with education regarding memory compensatory strategies. Pt. Encouraged to utilize strategies once discharged from the hospital. Pt. Verbalized understanding.  Tip sheep left at bedside.    Organization: Word Associations: 8/10 increased to 10/10 with min-max verbal cues    Problem Solving/Reasoning: Word Deductions: 13/15 increased to 15/15 with min verbal cues    Other: Pt. Seen for diet tolerance.  Pt. Edge of bed with breakfast tray.  No s/s of aspiration with regular solids and thin liquid.  Mild extended mastication noted as pt. Missing

## 2025-05-07 NOTE — PROCEDURES
EEG REPORT           EEG Service Date: 5/6/2025    Date of Report: 5/7/2025    History: Latrell Enriquez is a 75 yo female with a history of recent right sided subdural hematoma less likely epidural due to multiple falls, hypertension, hyperlipidemia, diabetes mellitus, retinopathy and neuropathy, angina, COPD, CKD, prolactinoma, vitamin B12 lucency, had a previous angioedema reaction to lisinopril presenting after noticing her arm moving funny for around 3 minutes: She noticed that her arm was moving rhythmically and without her control, she said it was jerking upwards and was concerning for her.     Medications:     Duration of Study: 0:31:56    Report: This EEG was acquired with electrodes placed according to the 10-20 electrode placement system. A single EKG channel was recorded for cardiac rhythm monitoring. Video was recorded simultaneously. Quality of recording was fair.     Occipital dominant rhythm was seen as 10 Hz and had amplitude of 20-70 µV. This activity was sinusoidal in morphology, and was reactive to eye closure.  Patient stayed awake 10% of the recording.    Drowsiness and sleep were manifested by disappearance of eye blinks and EMG artifact, drop-off in PDR, appearance of vertex sharp waves, sleep spindles, K-complexes, POSTS, consistent with stages N1 and N2 of non-REM sleep.  Sleep was present in 90% of the recording.     Hyperventilation and photic stimulation were not performed.     Single lead EKG shows a regular heart rate of 84 beats per minute.      EEG  Classification: Normal (Awake, Sleep)     Clinical Correlation:   This EEG is within normal limits.  No epileptiform discharges or EEG seizures were seen during this recording.      Fozia Teran MD  Board Certified Neurology and  Clinical Neurophysiology

## 2025-05-07 NOTE — DISCHARGE INSTRUCTIONS
In addition to seizure medications, therapeutic lifestyle changes may also reduce the likelihood of seizures. These modifications include avoiding excessive alcohol use, obtaining adequate and appropriate sleep, and reducing stress. Dietary changes such as, a modified Atkins diet (high fat and protein/low carbohydrate), may reduce the risk of seizures as well.     Seizure and epilepsy safety was also outlined. Specifically, we reviewed that she would not be able to operate a motor vehicle in the [facility] until she has been seizure free for 6 months. Other restrictions were reviewed including operating heavy or electrical machinery, swimming alone, navigating heights, or engaging in any activity that may be injurious to her or others if there was an unexpected loss of consciousness or loss of function. It was explained to the patient that chronic use of some seizure medications may lead to osteoporosis or osteopenia. Daily supplementation of Vitamin D and calcium was encouraged along with a bone density study to determine overall bone health. The potential risks from seizures including aspiration pneumonia, vertebral compression fracture, shoulder/mandible dislocation, head trauma, burns, drowning, and death (SUDEP) were briefly reviewed. The potential for neuronal injury from prolonged seizures or status epilepticus was discussed.

## 2025-05-07 NOTE — DISCHARGE INSTR - COC
Continuity of Care Form    Patient Name: Rica Enriquez   :  1950  MRN:  [id number]    Admit date:  2025  Discharge date:  ***    Code Status Order: Full Code   Advance Directives:     Admitting Physician:  Emilia Kidd DO  PCP: Huong Glasgow MD    Discharging Nurse: ***  Discharging Hospital Unit/Room#: 0139/0139-01  Discharging Unit Phone Number: ***    Emergency Contact:   Extended Emergency Contact Information  Primary Emergency Contact: Denton Wallace  Address: [address] [place] [address] [address] [place], [place] [id number] [facility]  Home Phone: [phone]  Relation: Child    Past Surgical History:  Past Surgical History:   Procedure Laterality Date    BREAST LUMPECTOMY Left      SECTION      CHOLECYSTECTOMY      COLONOSCOPY  2013    COLONOSCOPY  2017    Carrillo corbin.    COLONOSCOPY N/A 2021    COLONOSCOPY POLYPECTOMY SNARE/COLD BIOPSY performed by Jasmyn Red DO at [facility] OR    HYSTERECTOMY (CERVIX STATUS UNKNOWN)      ND NEUROPLASTY &/TRANSPOS MEDIAN NRV CARPAL TUNNE Right 2018    CARPAL TUNNEL RELEASE performed by Miah Rg MD at [facility] OR    ND TENDON SHEATH INCISION Right 2018    FINGER TRIGGER RELEASE-RIGHT MIDDLE performed by Miah Rg MD at [facility] OR       Immunization History:   Immunization History   Administered Date(s) Administered    COVID-19, MODERNA BLUE border, Primary or Immunocompromised, (age 12y+), IM, 100 mcg/0.5mL 2021, 2021       Active Problems:  Patient Active Problem List   Diagnosis Code    Left carpal tunnel syndrome G56.02    Diverticulitis K57.92    Colonic diverticular abscess K57.20    Diabetes mellitus type 2, noninsulin dependent (HCC) E11.9    Essential hypertension I10    History of colon polyps Z86.0100    Obesity (BMI 30-39.9) E66.9    Angioedema, initial encounter T78.3XXA    Angioedema of lips, initial encounter T78.3XXA    Recurrent falls R29.6    Traumatic subdural hemorrhage without loss of

## 2025-05-07 NOTE — DISCHARGE SUMMARY
Patient discharged home with family. AVS reviewed, all questions answered, all belongings sent with patient.

## 2025-05-07 NOTE — PROGRESS NOTES
CLINICAL PHARMACY NOTE: MEDS TO BEDS    Total # of Prescriptions Filled: 2   The following medications were delivered to the patient:  Levetiracetam 500mg tabs  Cephalexin 500mg caps    Additional Documentation:  Delivered to pt in room   139  on 5/7/2025 at 2:23 PM. No copay.

## 2025-05-07 NOTE — PLAN OF CARE
Problem: Chronic Conditions and Co-morbidities  Goal: Patient's chronic conditions and co-morbidity symptoms are monitored and maintained or improved  5/7/2025 1342 by Yarely Ken, RN  Outcome: Adequate for Discharge  Flowsheets (Taken 5/7/2025 0800)  Care Plan - Patient's Chronic Conditions and Co-Morbidity Symptoms are Monitored and Maintained or Improved:   Monitor and assess patient's chronic conditions and comorbid symptoms for stability, deterioration, or improvement   Collaborate with multidisciplinary team to address chronic and comorbid conditions and prevent exacerbation or deterioration   Update acute care plan with appropriate goals if chronic or comorbid symptoms are exacerbated and prevent overall improvement and discharge  5/7/2025 0514 by Janee Hernandez RN  Outcome: Progressing     Problem: Discharge Planning  Goal: Discharge to home or other facility with appropriate resources  5/7/2025 1342 by Yarely Ken, RN  Outcome: Adequate for Discharge  Flowsheets (Taken 5/7/2025 0800)  Discharge to home or other facility with appropriate resources:   Identify barriers to discharge with patient and caregiver   Identify discharge learning needs (meds, wound care, etc)   Arrange for needed discharge resources and transportation as appropriate   Refer to discharge planning if patient needs post-hospital services based on physician order or complex needs related to functional status, cognitive ability or social support system  5/7/2025 0514 by Janee Hernandez RN  Outcome: Progressing     Problem: Skin/Tissue Integrity  Goal: Skin integrity remains intact  Description: 1.  Monitor for areas of redness and/or skin breakdown2.  Assess vascular access sites hourly3.  Every 4-6 hours minimum:  Change oxygen saturation probe site4.  Every 4-6 hours:  If on nasal continuous positive airway pressure, respiratory therapy assess nares and determine need for appliance change or resting period  5/7/2025 1342  Status to a Safe Level of Function:   Administer medication as ordered   Assess activities of daily living deficits and provide assistive devices as needed   Assist and instruct patient to increase activity and self care as tolerated  5/7/2025 0514 by Janee Hernandez RN  Outcome: Progressing     Problem: Gastrointestinal - Adult  Goal: Maintains or returns to baseline bowel function  5/7/2025 1342 by Yarely Ken RN  Outcome: Adequate for Discharge  Flowsheets (Taken 5/7/2025 0800)  Maintains or returns to baseline bowel function:   Assess bowel function   Encourage oral fluids to ensure adequate hydration   Administer ordered medications as needed   Encourage mobilization and activity  5/7/2025 0514 by Janee Hernandez RN  Outcome: Progressing  Goal: Maintains adequate nutritional intake  5/7/2025 1342 by Yarely Ken RN  Outcome: Adequate for Discharge  Flowsheets (Taken 5/7/2025 0800)  Maintains adequate nutritional intake: Monitor percentage of each meal consumed  5/7/2025 0514 by Janee Hernandez RN  Outcome: Progressing     Problem: Infection - Adult  Goal: Absence of infection at discharge  5/7/2025 1342 by Yarely Ken RN  Outcome: Adequate for Discharge  Flowsheets (Taken 5/7/2025 0800)  Absence of infection at discharge:   Assess and monitor for signs and symptoms of infection   Monitor lab/diagnostic results   Monitor all insertion sites i.e., indwelling lines, tubes and drains   Administer medications as ordered   Instruct and encourage patient and family to use good hand hygiene technique   Identify and instruct in appropriate isolation precautions for identified infection/condition  5/7/2025 0514 by Janee Hernandez RN  Outcome: Progressing  Goal: Absence of infection during hospitalization  5/7/2025 1342 by Yarely Ken RN  Outcome: Adequate for Discharge  Flowsheets (Taken 5/7/2025 0800)  Absence of infection during hospitalization:   Assess and monitor for signs and symptoms

## 2025-05-07 NOTE — DISCHARGE SUMMARY
[facility]     Department of Neurology    INPATIENT DISCHARGE SUMMARY        Patient Identification:  Rica Enriquez is a 74 y.o. female.  :  1950  MRN: [id number]     Acct: [id number]   Admit Date:  2025  Discharge date and time: 2025  Attending Provider: Emilia Kidd DO                                     Admission Diagnoses:   Seizure (HCC) [R56.9]  Subdural hematoma (HCC) [S06.5XAA]  Seizures (HCC) [R56.9]    Discharge Diagnoses:   Principal Problem:    Seizures (HCC)  Active Problems:    Subdural hematoma (HCC)    Seizure (HCC)  Resolved Problems:    * No resolved hospital problems. *       Consults:   none    Brief Inpatient course:    75 yo female with a history of recent right sided subdural hematoma less likely epidural due to multiple falls, hypertension, hyperlipidemia, diabetes mellitus, retinopathy and neuropathy, angina, COPD, CKD, prolactinoma, vitamin B12 lucency, had a previous angioedema reaction to lisinopril presenting after noticing her arm moving funny for around 3 minutes. She noticed that her arm was moving rhythmically and without her control, she said it was jerking upwards and was concerning for her. At no point did she lose consciousness or feel drowsy and she did not have an aura. Patient is currently still at baseline and understands that her right sided SDH likely caused her to have a seizure after explaining it to her. CT head showed that the subdural has become chronic with much of which resolved and a much smaller midline shift.  Patient was started on Keppra 500 Mg twice daily.    Posthospital admission, she did not had any further episodes of rhythmic jerking.  EEG was done which was normal without any epileptiform activity.  She had mild hypokalemia and hypomagnesemia during the admission which was replaced.  She agreed to continue balanced electrolyte diets at home.  She is being discharged with Keppra 500 Mg twice daily.  She

## 2025-05-07 NOTE — PROGRESS NOTES
Writer visited per consult for emotional distress. Pt as sitting in chair and engaged well in conversation. Pt lives with son, [place], and expects him to visit today. Pt has a daughter who lives locally and a son who lives in [place]. Pt appeared to be in good spirits but then shared that she is having some family issues that are weighing on her. Writer provided support through active listening and words of affirmation, and assurance of prayers.    Spiritual health team will remain available for spiritual and emotional support.     Electronically signed by Chaplain Fauzia, on 5/7/2025 at 1:08 PM.  Spiritual Health  [facility]  [phone]

## 2025-05-08 ENCOUNTER — RESULTS FOLLOW-UP (OUTPATIENT)
Dept: EMERGENCY DEPT | Age: 75
End: 2025-05-08

## 2025-05-08 LAB
MICROORGANISM SPEC CULT: ABNORMAL
SPECIMEN DESCRIPTION: ABNORMAL

## 2025-07-16 ENCOUNTER — OFFICE VISIT (OUTPATIENT)
Dept: NEUROLOGY | Age: 75
End: 2025-07-16
Payer: MEDICARE

## 2025-07-16 VITALS
HEART RATE: 75 BPM | DIASTOLIC BLOOD PRESSURE: 74 MMHG | SYSTOLIC BLOOD PRESSURE: 129 MMHG | HEIGHT: 64 IN | WEIGHT: 187 LBS | BODY MASS INDEX: 31.92 KG/M2

## 2025-07-16 DIAGNOSIS — R56.1 POST TRAUMATIC SEIZURE (HCC): ICD-10-CM

## 2025-07-16 DIAGNOSIS — G25.2 INTENTION TREMOR: ICD-10-CM

## 2025-07-16 DIAGNOSIS — R29.6 FREQUENT FALLS: Primary | ICD-10-CM

## 2025-07-16 DIAGNOSIS — S06.5XAA SUBDURAL HEMATOMA (HCC): ICD-10-CM

## 2025-07-16 PROCEDURE — 1160F RVW MEDS BY RX/DR IN RCRD: CPT | Performed by: PHYSICIAN ASSISTANT

## 2025-07-16 PROCEDURE — 1159F MED LIST DOCD IN RCRD: CPT | Performed by: PHYSICIAN ASSISTANT

## 2025-07-16 PROCEDURE — G8427 DOCREV CUR MEDS BY ELIG CLIN: HCPCS | Performed by: PHYSICIAN ASSISTANT

## 2025-07-16 PROCEDURE — 3078F DIAST BP <80 MM HG: CPT | Performed by: PHYSICIAN ASSISTANT

## 2025-07-16 PROCEDURE — 1036F TOBACCO NON-USER: CPT | Performed by: PHYSICIAN ASSISTANT

## 2025-07-16 PROCEDURE — 3017F COLORECTAL CA SCREEN DOC REV: CPT | Performed by: PHYSICIAN ASSISTANT

## 2025-07-16 PROCEDURE — 3074F SYST BP LT 130 MM HG: CPT | Performed by: PHYSICIAN ASSISTANT

## 2025-07-16 PROCEDURE — 99204 OFFICE O/P NEW MOD 45 MIN: CPT | Performed by: PHYSICIAN ASSISTANT

## 2025-07-16 PROCEDURE — G8417 CALC BMI ABV UP PARAM F/U: HCPCS | Performed by: PHYSICIAN ASSISTANT

## 2025-07-16 PROCEDURE — 1090F PRES/ABSN URINE INCON ASSESS: CPT | Performed by: PHYSICIAN ASSISTANT

## 2025-07-16 PROCEDURE — 1123F ACP DISCUSS/DSCN MKR DOCD: CPT | Performed by: PHYSICIAN ASSISTANT

## 2025-07-16 PROCEDURE — G8400 PT W/DXA NO RESULTS DOC: HCPCS | Performed by: PHYSICIAN ASSISTANT

## 2025-07-16 RX ORDER — ACETAMINOPHEN 500 MG
500 TABLET ORAL EVERY 6 HOURS PRN
COMMUNITY
Start: 2025-04-16

## 2025-07-16 RX ORDER — HYDROCODONE BITARTRATE AND ACETAMINOPHEN 7.5; 325 MG/1; MG/1
TABLET ORAL
COMMUNITY

## 2025-07-16 RX ORDER — MODAFINIL 200 MG/1
TABLET ORAL
COMMUNITY
Start: 2025-06-19

## 2025-07-16 RX ORDER — FLUTICASONE FUROATE, UMECLIDINIUM BROMIDE AND VILANTEROL TRIFENATATE 200; 62.5; 25 UG/1; UG/1; UG/1
POWDER RESPIRATORY (INHALATION)
COMMUNITY
Start: 2024-10-25

## 2025-07-16 RX ORDER — SILICONE ADHESIVE 1.5" X 3"
2 SHEET (EA) TOPICAL PRN
COMMUNITY

## 2025-07-16 RX ORDER — FLUTICASONE PROPIONATE 50 MCG
1 SPRAY, SUSPENSION (ML) NASAL DAILY
COMMUNITY

## 2025-07-16 RX ORDER — ONDANSETRON 4 MG/1
4 TABLET, ORALLY DISINTEGRATING ORAL EVERY 6 HOURS PRN
COMMUNITY
Start: 2025-04-21

## 2025-07-16 RX ORDER — MIRABEGRON 50 MG/1
50 TABLET, FILM COATED, EXTENDED RELEASE ORAL EVERY 24 HOURS
COMMUNITY

## 2025-07-16 NOTE — PROGRESS NOTES
[address]  [place] [address]  Dept: [phone]    PATIENT NAME: Rica Enriquez  PATIENT MRN: [id number]  PRIMARY CARE PHYSICIAN: Huong Glasgow MD    HPI:      Rica Enriquez is a 75 y.o. female who presents to clinic today for evaluation of SDH due to multiple falls with subsequent seizure-symptoms. Hisory is significant for DM2 with neuropathy/ retinopathy, COPD, CKD, B12 deficiency.     In May 2025 she presented to [facility] due to abrupt onset right arm rhythmic jerking lasting 3 minutes and lost consciounsess with prolonged post-ictal period. She has no recollection of EMS arrival. There was no altered awareness/tongue-biting, incontinence. Loaded with Keppra in ED and started on 500 mg BID on admission. Subsequent EEG was normal. Mag critically low at 0.7, B12 low at 219. Discharged on Keppra due to risk of seizure in the setting of persisting SDH on CT head when compared to a study from April 2025.     There have been no further episodes of right arm tremor or jerking. She has many year history of night abnormal leg movements which may represent RLS. She has bilateral upper ext tremor and will have difficulty typing on her phone. This has persisted years. No resting tremor.     Today she reports that in the 2 months since discharge she has had 3 falls. One was due to sliding off the bed. The other 2 occurred when getting out of bed. However, she has fallen many other times/ locations in the last year. Etiology of these is unclear. She did complete inpatient rehab through [facility] in April and there has been mild improvement in balance. All falls are sudden and due to losing her balance. She denies contributing leg weakness, dizziness, lightheadedness.  She did stop gabapentin 3 months ago as it was ineffective. Her psychiatrist has decreased her long time Wellbutrin from 300 mg to 150 mg. She continues to take flexeril 10 mg TID for neck, shoulder pain.

## 2025-07-24 ENCOUNTER — TELEPHONE (OUTPATIENT)
Dept: NEUROLOGY | Age: 75
End: 2025-07-24

## 2025-07-24 NOTE — TELEPHONE ENCOUNTER
Patients requesting the home health PT referral to be sent to [facility]. This was faxed [phone] along with the LVN.

## 2025-07-25 ENCOUNTER — TELEPHONE (OUTPATIENT)
Dept: NEUROLOGY | Age: 75
End: 2025-07-25

## 2025-07-25 NOTE — TELEPHONE ENCOUNTER
Kaya Bernal from Home health at called to notify that they Will be seeing patient 1-2 times a week for 6 weeks for balance and gait

## 2025-08-11 ENCOUNTER — HOSPITAL ENCOUNTER (OUTPATIENT)
Dept: CT IMAGING | Age: 75
Discharge: HOME | End: 2025-08-13
Payer: MEDICARE

## 2025-08-11 ENCOUNTER — HOSPITAL ENCOUNTER (OUTPATIENT)
Dept: LAB | Age: 75
Discharge: HOME | End: 2025-08-11
Payer: MEDICARE

## 2025-08-11 DIAGNOSIS — G25.2 INTENTION TREMOR: ICD-10-CM

## 2025-08-11 DIAGNOSIS — R29.6 FREQUENT FALLS: ICD-10-CM

## 2025-08-11 LAB
CERULOPLASMIN SERPL-MCNC: 22 MG/DL (ref 16–45)
MAGNESIUM SERPL-MCNC: 1.5 MG/DL (ref 1.6–2.4)

## 2025-08-11 PROCEDURE — 82390 ASSAY OF CERULOPLASMIN: CPT

## 2025-08-11 PROCEDURE — 36415 COLL VENOUS BLD VENIPUNCTURE: CPT

## 2025-08-11 PROCEDURE — 83735 ASSAY OF MAGNESIUM: CPT

## 2025-08-11 PROCEDURE — 70450 CT HEAD/BRAIN W/O DYE: CPT

## 2025-08-12 ENCOUNTER — RESULTS FOLLOW-UP (OUTPATIENT)
Dept: NEUROLOGY | Age: 75
End: 2025-08-12

## 2025-09-09 ENCOUNTER — APPOINTMENT (OUTPATIENT)
Dept: CARDIOLOGY | Facility: CLINIC | Age: 75
End: 2025-09-09
Payer: MEDICARE

## (undated) DEVICE — PEN: MARKING STD 100/CS: Brand: MEDICAL ACTION INDUSTRIES

## (undated) DEVICE — DRESSING PETRO W3XL3IN OIL EMUL N ADH GZ KNIT IMPREG CELOS

## (undated) DEVICE — BANDAGE COMPR L5YDXW2IN FOAM CO FLX

## (undated) DEVICE — MEDI-VAC NON-CONDUCTIVE TUBING7MM X 30.5 (100FT): Brand: CARDINAL HEALTH

## (undated) DEVICE — SOLUTION SURG PREP ANTIMICROBIAL 4 OZ SKIN WND EXIDINE

## (undated) DEVICE — GLOVE SURG SZ 75 L12IN FNGR THK87MIL WHT LTX FREE

## (undated) DEVICE — NEEDLE HYPO 22GA L1.5IN BLK S STL HUB POLYPR SHLD REG BVL

## (undated) DEVICE — HYPODERMIC SAFETY NEEDLE: Brand: MAGELLAN

## (undated) DEVICE — SOLUTION IV IRRIG POUR BRL 0.9% SODIUM CHL 2F7124

## (undated) DEVICE — PADDING CAST W2INXL4YD COT LO LINTING WYTEX

## (undated) DEVICE — CANNULA ORAL NSL AD CO2 N INTUB O2 DEL DISP TRU LNK

## (undated) DEVICE — Z DISCONTINUED BY MEDLINE USE 2711682 TRAY SKIN PREP DRY W/ PREM GLV

## (undated) DEVICE — SYRINGE, LUER LOCK, 10ML: Brand: MEDLINE

## (undated) DEVICE — FORCEPS BX 240CM JAW 3.2MM L CAP NDL MIC MESH TTH M00513372

## (undated) DEVICE — PADDING CAST W2INXL4YD ST COT COHESIVE HND TEARABLE SPEC

## (undated) DEVICE — BANDAGE COMPR W4INXL9FT EXSANG SGL LAYERED NO CLSR ESMARCH

## (undated) DEVICE — SUTURE ETHLN SZ 3-0 L18IN NONABSORBABLE BLK L24MM PS-1 3/8 1663G

## (undated) DEVICE — WRAP COMPR W2INXL5YD TAN SELF ADH COBAN

## (undated) DEVICE — SUTURE ETHLN SZ 3-0 L18IN NONABSORBABLE BLK PS-2 L19MM 3/8 1669H

## (undated) DEVICE — DISCONTINUED NO SUB IDED TG GLOVE SURG SENSICARE ALOE LT LF PF ST GRN SZ 8

## (undated) DEVICE — BANDAGE COMPR W4XL108IN WHT LAYERED NO CLSR SYN RUB ESMARCH

## (undated) DEVICE — GAUZE,SPONGE,FLUFF,6"X6.75",STRL,5/TRAY: Brand: MEDLINE

## (undated) DEVICE — HAND AND FT PK